# Patient Record
Sex: FEMALE | Race: WHITE | NOT HISPANIC OR LATINO | Employment: STUDENT | URBAN - METROPOLITAN AREA
[De-identification: names, ages, dates, MRNs, and addresses within clinical notes are randomized per-mention and may not be internally consistent; named-entity substitution may affect disease eponyms.]

---
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
## 2020-03-16 ENCOUNTER — APPOINTMENT (OUTPATIENT)
Dept: LAB | Facility: HOSPITAL | Age: 20
End: 2020-03-16
Payer: COMMERCIAL

## 2020-03-16 ENCOUNTER — OFFICE VISIT (OUTPATIENT)
Dept: FAMILY MEDICINE CLINIC | Facility: CLINIC | Age: 20
End: 2020-03-16
Payer: COMMERCIAL

## 2020-03-16 VITALS
HEIGHT: 66 IN | TEMPERATURE: 98.5 F | WEIGHT: 195 LBS | RESPIRATION RATE: 17 BRPM | DIASTOLIC BLOOD PRESSURE: 80 MMHG | HEART RATE: 113 BPM | BODY MASS INDEX: 31.34 KG/M2 | SYSTOLIC BLOOD PRESSURE: 130 MMHG | OXYGEN SATURATION: 98 %

## 2020-03-16 DIAGNOSIS — D50.9 IRON DEFICIENCY ANEMIA, UNSPECIFIED IRON DEFICIENCY ANEMIA TYPE: ICD-10-CM

## 2020-03-16 DIAGNOSIS — R51.9 FREQUENT HEADACHES: ICD-10-CM

## 2020-03-16 DIAGNOSIS — R51.9 FREQUENT HEADACHES: Primary | ICD-10-CM

## 2020-03-16 LAB
25(OH)D3 SERPL-MCNC: 11 NG/ML (ref 30–100)
ALBUMIN SERPL BCP-MCNC: 3.9 G/DL (ref 3.5–5)
ALP SERPL-CCNC: 74 U/L (ref 46–384)
ALT SERPL W P-5'-P-CCNC: 33 U/L (ref 12–78)
ANION GAP SERPL CALCULATED.3IONS-SCNC: 5 MMOL/L (ref 4–13)
AST SERPL W P-5'-P-CCNC: 17 U/L (ref 5–45)
BILIRUB SERPL-MCNC: 0.2 MG/DL (ref 0.2–1)
BUN SERPL-MCNC: 12 MG/DL (ref 5–25)
CALCIUM SERPL-MCNC: 9 MG/DL (ref 8.3–10.1)
CHLORIDE SERPL-SCNC: 109 MMOL/L (ref 100–108)
CO2 SERPL-SCNC: 27 MMOL/L (ref 21–32)
CREAT SERPL-MCNC: 0.76 MG/DL (ref 0.6–1.3)
ERYTHROCYTE [DISTWIDTH] IN BLOOD BY AUTOMATED COUNT: 12.4 % (ref 11.6–15.1)
FOLATE SERPL-MCNC: >20 NG/ML (ref 3.1–17.5)
GFR SERPL CREATININE-BSD FRML MDRD: 114 ML/MIN/1.73SQ M
GLUCOSE SERPL-MCNC: 99 MG/DL (ref 65–140)
HCT VFR BLD AUTO: 38.6 % (ref 34.8–46.1)
HGB BLD-MCNC: 12.4 G/DL (ref 11.5–15.4)
IRON SERPL-MCNC: 63 UG/DL (ref 50–170)
MCH RBC QN AUTO: 29 PG (ref 26.8–34.3)
MCHC RBC AUTO-ENTMCNC: 32.1 G/DL (ref 31.4–37.4)
MCV RBC AUTO: 90 FL (ref 82–98)
PLATELET # BLD AUTO: 255 THOUSANDS/UL (ref 149–390)
PMV BLD AUTO: 10.8 FL (ref 8.9–12.7)
POTASSIUM SERPL-SCNC: 3.8 MMOL/L (ref 3.5–5.3)
PROT SERPL-MCNC: 7.9 G/DL (ref 6.4–8.2)
RBC # BLD AUTO: 4.27 MILLION/UL (ref 3.81–5.12)
SODIUM SERPL-SCNC: 141 MMOL/L (ref 136–145)
TSH SERPL DL<=0.05 MIU/L-ACNC: 1.85 UIU/ML (ref 0.46–3.98)
VIT B12 SERPL-MCNC: 601 PG/ML (ref 100–900)
WBC # BLD AUTO: 5.09 THOUSAND/UL (ref 4.31–10.16)

## 2020-03-16 PROCEDURE — 1036F TOBACCO NON-USER: CPT | Performed by: FAMILY MEDICINE

## 2020-03-16 PROCEDURE — 82607 VITAMIN B-12: CPT

## 2020-03-16 PROCEDURE — 84443 ASSAY THYROID STIM HORMONE: CPT

## 2020-03-16 PROCEDURE — 83540 ASSAY OF IRON: CPT

## 2020-03-16 PROCEDURE — 99214 OFFICE O/P EST MOD 30 MIN: CPT | Performed by: FAMILY MEDICINE

## 2020-03-16 PROCEDURE — 82746 ASSAY OF FOLIC ACID SERUM: CPT

## 2020-03-16 PROCEDURE — 80053 COMPREHEN METABOLIC PANEL: CPT

## 2020-03-16 PROCEDURE — 82306 VITAMIN D 25 HYDROXY: CPT

## 2020-03-16 PROCEDURE — 3008F BODY MASS INDEX DOCD: CPT | Performed by: FAMILY MEDICINE

## 2020-03-16 PROCEDURE — 85027 COMPLETE CBC AUTOMATED: CPT

## 2020-03-16 PROCEDURE — 36415 COLL VENOUS BLD VENIPUNCTURE: CPT

## 2020-03-16 NOTE — PROGRESS NOTES
Assessment/Plan:    Frequent headaches  Patient has chronic recurrent headaches that are different from her migraine headaches  Will check labs as noted  Recommend patient keep a headache diary  Will refer to neurology for further evaluation and management  Iron deficiency anemia  Will assess with labs as indicated  Will determine further management based on the lab results  Diagnoses and all orders for this visit:    Frequent headaches  -     Comprehensive metabolic panel; Future  -     TSH, 3rd generation with Free T4 reflex; Future  -     Vitamin D 25 hydroxy; Future  -     Cancel: Ambulatory referral to Neurology; Future  -     Ambulatory referral to Neurology; Future    Iron deficiency anemia, unspecified iron deficiency anemia type  -     CBC and Platelet; Future  -     Vitamin B12; Future  -     Iron; Future  -     Folate; Future          Subjective:      Patient ID: Vazquez Suresh is a 23 y o  female  Migraines occasionally  Other headaches vary from 1-2/week to daily; various times of the day; no aura/warning; take ibuprofen then lays down with resolution  Head pain on top or at back of head; feels like a dull throbbing; no other symptoms  Has not seen neurology  Ongoing x years  Has glasses x 1 year      Headache    This is a recurrent problem  The current episode started more than 1 year ago  The problem occurs intermittently  The problem has been waxing and waning  The pain is located in the vertex region  The pain does not radiate  The pain quality is similar to prior headaches  The quality of the pain is described as throbbing and dull  Pertinent negatives include no coughing, dizziness, fever, nausea or vomiting  Nothing aggravates the symptoms  She has tried darkened room and NSAIDs for the symptoms  The treatment provided significant relief  Her past medical history is significant for migraine headaches (takes tripans PRN with relief) and migraines in the family (mother)   There is no history of hypertension, recent head traumas or sinus disease  The following portions of the patient's history were reviewed and updated as appropriate: allergies, current medications, past family history, past medical history, past social history, past surgical history and problem list     Review of Systems   Constitutional: Negative for fever  HENT: Negative for congestion  Respiratory: Negative for cough  Gastrointestinal: Negative for nausea and vomiting  Neurological: Positive for headaches  Negative for dizziness  All other systems reviewed and are negative  Objective:      /80 (BP Location: Left arm, Patient Position: Sitting, Cuff Size: Standard)   Pulse (!) 113   Temp 98 5 °F (36 9 °C) (Oral)   Resp 17   Ht 5' 6" (1 676 m)   Wt 88 5 kg (195 lb)   LMP 02/25/2020   SpO2 98%   BMI 31 47 kg/m²          Physical Exam   Constitutional: She is oriented to person, place, and time  She appears well-developed and well-nourished  She is cooperative  HENT:   Head: Normocephalic and atraumatic  Right Ear: Hearing, tympanic membrane, external ear and ear canal normal    Left Ear: Hearing, tympanic membrane, external ear and ear canal normal    Mouth/Throat: Uvula is midline, oropharynx is clear and moist and mucous membranes are normal    Eyes: Pupils are equal, round, and reactive to light  Conjunctivae, EOM and lids are normal    Neck: Trachea normal and normal range of motion  Neck supple  No thyromegaly present  Cardiovascular: Normal rate, regular rhythm and normal heart sounds  No murmur heard  Pulmonary/Chest: Effort normal and breath sounds normal  She has no decreased breath sounds  She has no wheezes  She has no rhonchi  She has no rales  Abdominal: Soft  Normal appearance and bowel sounds are normal  There is no hepatosplenomegaly  There is no tenderness  Musculoskeletal: Normal range of motion  Right ankle: She exhibits no swelling          Left ankle: She exhibits no swelling  Lymphadenopathy:        Head (right side): No submandibular adenopathy present  Head (left side): No submandibular adenopathy present  She has no cervical adenopathy  Neurological: She is alert and oriented to person, place, and time  No cranial nerve deficit  She exhibits normal muscle tone  Gait normal    Skin: Skin is warm, dry and intact  Psychiatric: She has a normal mood and affect  Her speech is normal and behavior is normal    Nursing note and vitals reviewed  BMI Counseling: Body mass index is 31 47 kg/m²  The BMI is above normal  Nutrition recommendations include encouraging healthy choices of fruits and vegetables, limiting drinks that contain sugar and moderation in carbohydrate intake  Exercise recommendations include exercising 3-5 times per week            Lab Results   Component Value Date    WBC 6 07 08/12/2019    HGB 12 4 08/12/2019    HCT 39 0 08/12/2019     08/12/2019    ALT 31 08/14/2018    AST 19 08/14/2018    K 3 9 08/14/2018     08/14/2018    CREATININE 0 74 08/14/2018    BUN 12 08/14/2018    CO2 27 08/14/2018    GLUF 82 08/14/2018

## 2020-03-16 NOTE — PATIENT INSTRUCTIONS
Acute Headache   AMBULATORY CARE:   An acute headache  is pain or discomfort that starts suddenly and gets worse quickly  You may have an acute headache only when you feel stress or eat certain foods  Other acute headache pain can happen every day, and sometimes several times a day  The cause of an acute headache may not be known  It may be triggered by stress, fatigue, hormones, food, or trauma  Common types of acute headache:   · Tension headache  is the most common type of headache  These headaches typically occur in the late afternoon and go away by evening  The pain is usually mild or moderate  You may have problems tolerating bright light or loud noise  The pain is usually across the forehead or in the back of the head, often only on one side  These headaches may occur every day  · Migraine headaches  cause moderate or severe pain  The headache generally lasts from 1 to 3 days and tends to come back  Pain is usually on only one side, but it may change sides  Migraines often occur in the temple, the back of the head, or behind the eye  The pain may throb or be sharp and steady  · A migraine with aura  means you see or feel something before a migraine  You may see a small spot surrounded by bright zigzag lines  Other signs or symptoms may follow the aura  · Cluster headache  pain is usually only on one side  It often causes severe pain, and can last for 30 minutes to 2 hours  These headaches may occur 1 or 2 times each day, more often at night  The pain may wake you  Seek care immediately if:   · You have severe pain  · You have numbness or weakness on one side of your face or body  · You have a headache that occurs after a blow to the head, a fall, or other trauma  · You have a headache, are forgetful or confused, or have trouble speaking  · You have a headache, stiff neck, and a fever  Contact your healthcare provider if:   · You have a constant headache and are vomiting      · You have a headache each day that does not get better, even after treatment  · You have changes in your headaches, or new symptoms that occur when you have a headache  · You have questions or concerns about your condition or care  Treatment:   · Medicine  may be given to decrease pain  The medicine your healthcare provider recommends will depend on the kind of headaches you have  You will need to take prescription headache medicines as directed to prevent a problem called rebound headache  These headaches happen with regular use of pain relievers for headache disorders  NSAIDs or acetaminophen may help some kinds of headaches  · Biofeedback  may help you learn how to change stress reactions  For example, you learn to slow your heart rate when you become upset  You may also learn to prevent certain headaches by combining heat with relaxation  · Cognitive behavior therapy,  or stress management, may be used with other therapies to prevent headaches  Manage your symptoms:   · Apply heat or ice  on the headache area  Use a heat or ice pack  For an ice pack, you can also put crushed ice in a plastic bag  Cover the pack or bag with a towel before you apply it to your skin  Ice and heat both help decrease pain, and heat helps decrease muscle spasms  Apply heat for 20 to 30 minutes every 2 hours  Apply ice for 15 to 20 minutes every hour  Apply heat or ice for as long and for as many days as directed  You may alternate heat and ice  · Relax your muscles  Lie down in a comfortable position and close your eyes  Relax your muscles slowly  Start at your toes and work your way up your body  · Keep a record of your headaches  Write down when your headaches start and stop  Include your symptoms and what you were doing when the headache began  Record what you ate or drank for 24 hours before the headache started  Describe the pain and where it hurts   Keep track of what you did to treat your headache and if it worked  Prevent an acute headache:   · Avoid anything that triggers an acute headache  Examples include exposure to chemicals, going to high altitude, or not getting enough sleep  Create a regular sleep routine  Go to sleep at the same time and wake up at the same time each day  Do not use electronic devices before bedtime  These may trigger a headache or prevent you from sleeping well  · Do not smoke  Nicotine and other chemicals in cigarettes and cigars can trigger an acute headache or make it worse  Ask your healthcare provider for information if you currently smoke and need help to quit  E-cigarettes or smokeless tobacco still contain nicotine  Talk to your healthcare provider before you use these products  · Limit alcohol as directed  Alcohol can trigger an acute headache or make it worse  If you have cluster headaches, do not drink alcohol during an episode  For other types of headaches, ask your healthcare provider if it is safe for you to drink alcohol  Ask how much is safe for you to drink, and how often  · Exercise as directed  Exercise can reduce tension and help with headache pain  Aim for 30 minutes of physical activity on most days of the week  Your healthcare provider can help you create an exercise plan  · Eat a variety of healthy foods  Healthy foods include fruits, vegetables, low-fat dairy products, lean meats, fish, whole grains, and cooked beans  Your healthcare provider or dietitian can help you create meals plans if you need to avoid foods that trigger headaches  Follow up with your healthcare provider as directed:  Bring your headache record with you when you see your healthcare provider  Write down your questions so you remember to ask them during your visits  © 2017 [employer] Information is for End User's use only and may not be sold, redistributed or otherwise used for commercial purposes   All illustrations and images included in CareNotes® are the copyrighted property of [employer]  or Richard Hernandez  The above information is an  only  It is not intended as medical advice for individual conditions or treatments  Talk to your doctor, nurse or pharmacist before following any medical regimen to see if it is safe and effective for you

## 2020-03-16 NOTE — ASSESSMENT & PLAN NOTE
Patient has chronic recurrent headaches that are different from her migraine headaches  Will check labs as noted  Recommend patient keep a headache diary  Will refer to neurology for further evaluation and management

## 2020-03-26 ENCOUNTER — OFFICE VISIT (OUTPATIENT)
Dept: URGENT CARE | Facility: CLINIC | Age: 20
End: 2020-03-26
Payer: COMMERCIAL

## 2020-03-26 VITALS
OXYGEN SATURATION: 98 % | RESPIRATION RATE: 18 BRPM | HEIGHT: 66 IN | DIASTOLIC BLOOD PRESSURE: 84 MMHG | HEART RATE: 92 BPM | TEMPERATURE: 98.2 F | BODY MASS INDEX: 31.02 KG/M2 | SYSTOLIC BLOOD PRESSURE: 134 MMHG | WEIGHT: 193 LBS

## 2020-03-26 DIAGNOSIS — H92.01 EARACHE ON RIGHT: Primary | ICD-10-CM

## 2020-03-26 PROCEDURE — G0382 LEV 3 HOSP TYPE B ED VISIT: HCPCS | Performed by: PHYSICIAN ASSISTANT

## 2020-03-26 NOTE — PROGRESS NOTES
[facility]        NAME: Austen Cooper is a 23 y o  female  : 2000    MRN: [id number]  DATE: 2020  TIME: 4:18 PM    Assessment and Plan   Earache on right [H92 01]  1  Earache on right           Patient Instructions     Recommend continued use of debrox, but discontinue use of q-tips  Recommend OTC ibuprofen for discomfort  Monitor for worsening symptoms  Follow up with PCP in 3-5 days  Proceed to  ER if symptoms worsen  Chief Complaint     Chief Complaint   Patient presents with    Earache     right ear was being cleaned last night with Debrox and a Q-tip  Today ear is painful and muffled  History of Present Illness       Patient presents with complaint of right earache since yesterday  Pt states that she was cleaning her ear with debrox but "it was not doing what I wanted" so she started cleaning it with a q-tip  Pt states that her ear is aching today but denies any sharp pain at any point  Pt denies fever, chills, night sweats, ear drainage, sore throat, cough, and congestion  Pt denies trying any palliative measures  She denies recent sick contacts  Review of Systems   Review of Systems   Constitutional: Negative for chills, fatigue and fever  HENT: Positive for ear pain  Negative for ear discharge  Eyes: Negative for pain  Respiratory: Negative for cough, chest tightness, shortness of breath and wheezing  Cardiovascular: Negative for chest pain and palpitations  Musculoskeletal: Negative for myalgias  Skin: Negative for color change, rash and wound  Neurological: Negative for headaches  All other systems reviewed and are negative          Current Medications       Current Outpatient Medications:     norgestimate-ethinyl estradiol (TRI-LO-GABRIELLE) 0 18/0 215/0 25 MG-25 MCG per tablet, Take 1 tablet by mouth daily, Disp: 28 tablet, Rfl: 2    rizatriptan (MAXALT-MLT) 10 MG disintegrating tablet, Take 1 tablet (10 mg total) by mouth once as needed for migraine for up to 1 dose May repeat in 2 hours if needed, Disp: 9 tablet, Rfl: 0    Current Allergies     Allergies as of 03/26/2020 - Reviewed 03/26/2020   Allergen Reaction Noted    Augmentin [amoxicillin-pot clavulanate] Diarrhea 03/19/2018    Fish-derived products Vomiting 03/19/2018            The following portions of the patient's history were reviewed and updated as appropriate: allergies, current medications, past family history, past medical history, past social history, past surgical history and problem list      History reviewed  No pertinent past medical history  Past Surgical History:   Procedure Laterality Date    ADENOIDECTOMY      TONSILLECTOMY AND ADENOIDECTOMY         Family History   Problem Relation Age of Onset    Diabetes Father     Hypertension Father     Hypertension Mother     Hypertension Maternal Grandmother     Breast cancer Paternal Grandmother          Medications have been verified  Objective   /84   Pulse 92   Temp 98 2 °F (36 8 °C)   Resp 18   Ht 5' 6" (1 676 m)   Wt 87 5 kg (193 lb)   LMP 02/25/2020   SpO2 98%   BMI 31 15 kg/m²        Physical Exam     Physical Exam   Constitutional: She is oriented to person, place, and time  She appears well-developed and well-nourished  No distress  HENT:   Head: Normocephalic and atraumatic  Right Ear: External ear normal    Left Ear: External ear normal    Mouth/Throat: Oropharynx is clear and moist    Right ear: no edema, drainage, or erythema; cerumen impaction, unable to visualize TM; no tenderness   Eyes: Pupils are equal, round, and reactive to light  Conjunctivae and EOM are normal    Neck: Normal range of motion  Neck supple  Cardiovascular: Normal rate, regular rhythm and normal heart sounds  Pulmonary/Chest: Effort normal and breath sounds normal  No respiratory distress  Lymphadenopathy:     She has no cervical adenopathy     Neurological: She is alert and oriented to person, place, and time  No cranial nerve deficit or sensory deficit  Skin: Skin is warm and dry  Capillary refill takes less than 2 seconds  No rash noted  She is not diaphoretic  Psychiatric: She has a normal mood and affect  Her behavior is normal  Thought content normal    Nursing note and vitals reviewed

## 2020-04-08 ENCOUNTER — TELEPHONE (OUTPATIENT)
Dept: NEUROLOGY | Facility: CLINIC | Age: 20
End: 2020-04-08

## 2020-04-14 ENCOUNTER — TELEPHONE (OUTPATIENT)
Dept: NEUROLOGY | Facility: CLINIC | Age: 20
End: 2020-04-14

## 2020-04-17 ENCOUNTER — TELEMEDICINE (OUTPATIENT)
Dept: NEUROLOGY | Facility: CLINIC | Age: 20
End: 2020-04-17
Payer: COMMERCIAL

## 2020-04-17 ENCOUNTER — TELEPHONE (OUTPATIENT)
Dept: NEUROLOGY | Facility: CLINIC | Age: 20
End: 2020-04-17

## 2020-04-17 DIAGNOSIS — R51.9 ACUTE NONINTRACTABLE HEADACHE, UNSPECIFIED HEADACHE TYPE: ICD-10-CM

## 2020-04-17 DIAGNOSIS — R51.9 FREQUENT HEADACHES: ICD-10-CM

## 2020-04-17 PROCEDURE — 99203 OFFICE O/P NEW LOW 30 MIN: CPT | Performed by: PSYCHIATRY & NEUROLOGY

## 2020-04-17 RX ORDER — RIZATRIPTAN BENZOATE 10 MG/1
10 TABLET, ORALLY DISINTEGRATING ORAL ONCE AS NEEDED
Qty: 9 TABLET | Refills: 0 | Status: SHIPPED | OUTPATIENT
Start: 2020-04-17 | End: 2021-02-10 | Stop reason: SDUPTHER

## 2020-04-17 RX ORDER — TOPIRAMATE 25 MG/1
TABLET ORAL
Qty: 90 TABLET | Refills: 0 | Status: SHIPPED | OUTPATIENT
Start: 2020-04-17 | End: 2020-05-20 | Stop reason: SDUPTHER

## 2020-04-28 DIAGNOSIS — N92.0 MENORRHAGIA WITH REGULAR CYCLE: ICD-10-CM

## 2020-04-28 RX ORDER — NORGESTIMATE AND ETHINYL ESTRADIOL
KIT
Qty: 28 TABLET | Refills: 0 | OUTPATIENT
Start: 2020-04-28

## 2020-05-20 ENCOUNTER — TELEMEDICINE (OUTPATIENT)
Dept: NEUROLOGY | Facility: CLINIC | Age: 20
End: 2020-05-20
Payer: COMMERCIAL

## 2020-05-20 ENCOUNTER — TELEPHONE (OUTPATIENT)
Dept: NEUROLOGY | Facility: CLINIC | Age: 20
End: 2020-05-20

## 2020-05-20 DIAGNOSIS — R51.9 FREQUENT HEADACHES: ICD-10-CM

## 2020-05-20 PROCEDURE — 99213 OFFICE O/P EST LOW 20 MIN: CPT | Performed by: PSYCHIATRY & NEUROLOGY

## 2020-05-20 RX ORDER — TOPIRAMATE 25 MG/1
TABLET ORAL
Qty: 180 TABLET | Refills: 0 | Status: SHIPPED | OUTPATIENT
Start: 2020-05-20 | End: 2020-09-18 | Stop reason: SDUPTHER

## 2020-06-02 ENCOUNTER — HOSPITAL ENCOUNTER (OUTPATIENT)
Dept: MRI IMAGING | Facility: HOSPITAL | Age: 20
Discharge: HOME | End: 2020-06-02
Attending: PSYCHIATRY & NEUROLOGY
Payer: COMMERCIAL

## 2020-06-02 DIAGNOSIS — R51.9 FREQUENT HEADACHES: ICD-10-CM

## 2020-06-02 DIAGNOSIS — R51.9 ACUTE NONINTRACTABLE HEADACHE, UNSPECIFIED HEADACHE TYPE: ICD-10-CM

## 2020-06-02 PROCEDURE — 70551 MRI BRAIN STEM W/O DYE: CPT

## 2020-06-03 ENCOUNTER — TELEPHONE (OUTPATIENT)
Dept: NEUROLOGY | Facility: CLINIC | Age: 20
End: 2020-06-03

## 2020-06-03 DIAGNOSIS — N92.0 MENORRHAGIA WITH REGULAR CYCLE: Primary | ICD-10-CM

## 2020-06-03 DIAGNOSIS — R93.0 ABNORMAL MRI OF HEAD: ICD-10-CM

## 2020-06-05 ENCOUNTER — TELEPHONE (OUTPATIENT)
Dept: NEUROLOGY | Facility: CLINIC | Age: 20
End: 2020-06-05

## 2020-06-08 ENCOUNTER — APPOINTMENT (OUTPATIENT)
Dept: LAB | Facility: HOSPITAL | Age: 20
End: 2020-06-08
Attending: PSYCHIATRY & NEUROLOGY
Payer: COMMERCIAL

## 2020-06-08 DIAGNOSIS — N92.0 MENORRHAGIA WITH REGULAR CYCLE: ICD-10-CM

## 2020-06-08 DIAGNOSIS — R93.0 ABNORMAL MRI OF HEAD: ICD-10-CM

## 2020-06-08 LAB
BUN SERPL-MCNC: 10 MG/DL (ref 5–25)
CREAT SERPL-MCNC: 0.86 MG/DL (ref 0.6–1.3)
GFR SERPL CREATININE-BSD FRML MDRD: 98 ML/MIN/1.73SQ M

## 2020-06-08 PROCEDURE — 82565 ASSAY OF CREATININE: CPT

## 2020-06-08 PROCEDURE — 36415 COLL VENOUS BLD VENIPUNCTURE: CPT

## 2020-06-08 PROCEDURE — 84520 ASSAY OF UREA NITROGEN: CPT

## 2020-06-10 ENCOUNTER — HOSPITAL ENCOUNTER (OUTPATIENT)
Dept: MRI IMAGING | Facility: HOSPITAL | Age: 20
Discharge: HOME | End: 2020-06-10
Attending: PSYCHIATRY & NEUROLOGY
Payer: COMMERCIAL

## 2020-06-10 DIAGNOSIS — N92.0 MENORRHAGIA WITH REGULAR CYCLE: ICD-10-CM

## 2020-06-10 DIAGNOSIS — R93.0 ABNORMAL MRI OF HEAD: ICD-10-CM

## 2020-06-10 PROCEDURE — A9585 GADOBUTROL INJECTION: HCPCS | Performed by: PSYCHIATRY & NEUROLOGY

## 2020-06-10 PROCEDURE — 70553 MRI BRAIN STEM W/O & W/DYE: CPT

## 2020-06-10 RX ADMIN — GADOBUTROL 8 ML: 604.72 INJECTION INTRAVENOUS at 16:53

## 2020-06-12 ENCOUNTER — TELEPHONE (OUTPATIENT)
Dept: NEUROLOGY | Facility: CLINIC | Age: 20
End: 2020-06-12

## 2020-06-17 DIAGNOSIS — N92.0 MENORRHAGIA WITH REGULAR CYCLE: ICD-10-CM

## 2020-06-17 RX ORDER — NORGESTIMATE AND ETHINYL ESTRADIOL 7DAYSX3 LO
1 KIT ORAL DAILY
Qty: 28 TABLET | Refills: 2 | Status: SHIPPED | OUTPATIENT
Start: 2020-06-17 | End: 2021-02-10 | Stop reason: SDUPTHER

## 2020-07-14 ENCOUNTER — OFFICE VISIT (OUTPATIENT)
Dept: FAMILY MEDICINE CLINIC | Facility: CLINIC | Age: 20
End: 2020-07-14
Payer: COMMERCIAL

## 2020-07-14 VITALS
HEIGHT: 66 IN | WEIGHT: 175 LBS | RESPIRATION RATE: 17 BRPM | DIASTOLIC BLOOD PRESSURE: 80 MMHG | OXYGEN SATURATION: 98 % | SYSTOLIC BLOOD PRESSURE: 132 MMHG | HEART RATE: 110 BPM | TEMPERATURE: 99.2 F | BODY MASS INDEX: 28.12 KG/M2

## 2020-07-14 DIAGNOSIS — E23.7 PITUITARY ABNORMALITY (HCC): Primary | ICD-10-CM

## 2020-07-14 PROCEDURE — 1036F TOBACCO NON-USER: CPT | Performed by: FAMILY MEDICINE

## 2020-07-14 PROCEDURE — 3008F BODY MASS INDEX DOCD: CPT | Performed by: FAMILY MEDICINE

## 2020-07-14 PROCEDURE — 99214 OFFICE O/P EST MOD 30 MIN: CPT | Performed by: FAMILY MEDICINE

## 2020-07-14 NOTE — PATIENT INSTRUCTIONS
Pituitary Adenoma   WHAT YOU NEED TO KNOW:   What is a pituitary adenoma? A pituitary adenoma is a benign (not cancer) tumor found in your pituitary  The pituitary is a small gland in your brain that makes hormones to control other organs and tissues in your body  A pituitary adenoma can put pressure on nearby nerves and brain tissue  A pituitary adenoma can also release high levels of hormones that affect how other organs and tissues work  What are the signs and symptoms of a pituitary adenoma? Your signs and symptoms will depend on the size of the tumor and the hormones released  The following are the most common signs and symptoms of a pituitary adenoma:  · Headaches    · Changes in your vision    · Tender or enlarged breasts    · Difficulty getting pregnant  How is a pituitary adenoma diagnosed? · Blood tests  are done to measure hormone levels and get information about your overall health  · A 24 hour urine test  is done to check the amount of hormone in your urine  You will need to collect all your urine for 1 day  Healthcare providers will give you a container to hold your urine and keep it cold  · A CT or MRI  will show the size and location of the pituitary adenoma  You may be given contrast dye to help the tumor show up better in the pictures  Tell the healthcare provider if you have ever had an allergic reaction to contrast dye  Do not enter the MRI room with anything metal  Metal can cause serious injury  Tell the healthcare provider if you have any metal in or on your body  How is a pituitary adenoma treated? · Medicines  decrease hormone levels when they are too high  · Surgery  is done to remove or decrease the size of the pituitary adenoma  · Radiation therapy or radiosurgery  uses high energy x-ray beams to kill tumor cells and decrease the size of the tumor  When should I contact my healthcare provider?    · You feel fatigued, anxious, or you have sudden mood changes  · You have an increased amount of facial hair  · You have changes in the shape of your face  · Your period stops or becomes irregular  · You have questions or concerns about your condition or care  When should I seek immediate care or call 911? · You are dizzy or feel confused  · You have a severe headache and a stiff, painful neck  · You have sudden weight gain or weight loss  · Your heart rate is faster and stronger than normal for you  · You have sudden vision changes or cannot move your eyes from side to side  CARE AGREEMENT:   You have the right to help plan your care  Learn about your health condition and how it may be treated  Discuss treatment options with your caregivers to decide what care you want to receive  You always have the right to refuse treatment  The above information is an  only  It is not intended as medical advice for individual conditions or treatments  Talk to your doctor, nurse or pharmacist before following any medical regimen to see if it is safe and effective for you  © 2017 [employer] Information is for End User's use only and may not be sold, redistributed or otherwise used for commercial purposes  All illustrations and images included in CareNotes® are the copyrighted property of [employer]  or Richard Hernandez  Not applicable

## 2020-07-14 NOTE — PROGRESS NOTES
Assessment/Plan:    Pituitary abnormality ([facility] )  Will begin evaluation with labs ordered  Will also refer to endocrinology and ophthalmology for further evaluation and management of this issue  Diagnoses and all orders for this visit:    Pituitary abnormality ([facility] )  -     Prolactin; Future  -     Insulin-like growth factor 1 (IGF-1); Future  -     FSH and LH; Future  -     TSH, 3rd generation; Future  -     T4, free; Future  -     Cortisol, Free, Urine, 24 Hour; Future  -     Ambulatory referral to Endocrinology; Future  -     Ambulatory referral to Ophthalmology; Future          Subjective:      Patient ID: Antoinette Carbajal is a 21 y o  female  Patient presents to clinic today for a follow-up visit  Patient was sent for follow-up by neurology due to an abnormal finding on MRI as follows:    "Mild diffuse enlargement of the pituitary gland with homogeneous enhancement  Differential considerations include pituitary hyperplasia versus macroadenoma  Correlation with clinical and laboratory parameters recommended for endocrine dysfunction "    Neurology's recommendation was as follows:    "Pt needs to have this followed up by her pcp for any endocrine dysfunction  Pt will need pituitary labs and likely need endocrine consultation  I would also recommend pt to see optho to follow her vision  Per imaging the optic chiasm is normal  Pcp and endo will need to figure out if pituitary is just enlarged or if any underlying abnormal growth  Please let pt know further eval is needed by her pcp "    Patient denies any symptoms; she reports she feels normal   She had a prolactin level done in 2018 due to menorrhagia with a normal prolactin level noted        The following portions of the patient's history were reviewed and updated as appropriate: allergies, current medications, past family history, past medical history, past social history, past surgical history and problem list     Review of Systems   Eyes: Negative for visual disturbance  Psychiatric/Behavioral: Positive for dysphoric mood (social distancing causing some emotional distress)  Objective:      /80 (BP Location: Left arm, Patient Position: Sitting, Cuff Size: Standard)   Pulse (!) 110   Temp 99 2 °F (37 3 °C) (Tympanic)   Resp 17   Ht 5' 6" (1 676 m)   Wt 79 4 kg (175 lb)   LMP 06/29/2020   SpO2 98%   BMI 28 25 kg/m²          Physical Exam   Constitutional: She is oriented to person, place, and time  She appears well-developed and well-nourished  She is cooperative  No distress  HENT:   Head: Normocephalic and atraumatic  Right Ear: Hearing and external ear normal    Left Ear: Hearing and external ear normal    Eyes: Conjunctivae and lids are normal    Cardiovascular: Normal rate  Pulmonary/Chest: Effort normal  No respiratory distress  Musculoskeletal: Normal range of motion  Neurological: She is alert and oriented to person, place, and time  She exhibits normal muscle tone  Gait normal    Skin: Skin is warm, dry and intact  Psychiatric: She has a normal mood and affect  Her speech is normal and behavior is normal  Thought content normal    Nursing note and vitals reviewed

## 2020-07-14 NOTE — ASSESSMENT & PLAN NOTE
Will begin evaluation with labs ordered  Will also refer to endocrinology and ophthalmology for further evaluation and management of this issue

## 2020-07-27 ENCOUNTER — APPOINTMENT (OUTPATIENT)
Dept: LAB | Facility: HOSPITAL | Age: 20
End: 2020-07-27
Payer: COMMERCIAL

## 2020-07-27 DIAGNOSIS — E23.7 PITUITARY ABNORMALITY (HCC): ICD-10-CM

## 2020-07-27 LAB
FSH SERPL-ACNC: 4.7 MIU/ML
LH SERPL-ACNC: 25.2 MIU/ML
PROLACTIN SERPL-MCNC: 9.5 NG/ML
T4 FREE SERPL-MCNC: 1.09 NG/DL (ref 0.78–1.33)
TSH SERPL DL<=0.05 MIU/L-ACNC: 2.15 UIU/ML (ref 0.46–3.98)

## 2020-07-27 PROCEDURE — 84146 ASSAY OF PROLACTIN: CPT

## 2020-07-27 PROCEDURE — 84439 ASSAY OF FREE THYROXINE: CPT

## 2020-07-27 PROCEDURE — 36415 COLL VENOUS BLD VENIPUNCTURE: CPT

## 2020-07-27 PROCEDURE — 83002 ASSAY OF GONADOTROPIN (LH): CPT

## 2020-07-27 PROCEDURE — 84305 ASSAY OF SOMATOMEDIN: CPT

## 2020-07-27 PROCEDURE — 84443 ASSAY THYROID STIM HORMONE: CPT

## 2020-07-27 PROCEDURE — 83001 ASSAY OF GONADOTROPIN (FSH): CPT

## 2020-07-29 ENCOUNTER — APPOINTMENT (OUTPATIENT)
Dept: LAB | Facility: HOSPITAL | Age: 20
End: 2020-07-29
Payer: COMMERCIAL

## 2020-07-29 DIAGNOSIS — E23.7 PITUITARY ABNORMALITY (HCC): ICD-10-CM

## 2020-07-29 LAB — IGF-I SERPL-MCNC: 112 NG/ML (ref 108–384)

## 2020-07-29 PROCEDURE — 82530 CORTISOL FREE: CPT

## 2020-08-02 LAB
CORTIS F 24H UR-MRATE: 10 UG/24 HR (ref 6–42)
CORTIS F UR-MCNC: 9 UG/L

## 2020-08-19 ENCOUNTER — CONSULT (OUTPATIENT)
Dept: ENDOCRINOLOGY | Facility: CLINIC | Age: 20
End: 2020-08-19
Payer: COMMERCIAL

## 2020-08-19 VITALS
HEART RATE: 104 BPM | SYSTOLIC BLOOD PRESSURE: 108 MMHG | WEIGHT: 165 LBS | HEIGHT: 66 IN | DIASTOLIC BLOOD PRESSURE: 70 MMHG | BODY MASS INDEX: 26.52 KG/M2

## 2020-08-19 DIAGNOSIS — E23.7 PITUITARY ABNORMALITY (HCC): ICD-10-CM

## 2020-08-19 PROCEDURE — 99243 OFF/OP CNSLTJ NEW/EST LOW 30: CPT | Performed by: INTERNAL MEDICINE

## 2020-08-19 RX ORDER — FERROUS SULFATE 325(65) MG
325 TABLET ORAL
COMMUNITY

## 2020-08-19 RX ORDER — MELATONIN
1000 DAILY
COMMUNITY

## 2020-08-19 NOTE — PROGRESS NOTES
Chief Complaint   Patient presents with    enlarged pituitary      Referring Provider  Camille Rachel Md  [address] [facility], [address] [place] [address]     History of Present Illness:   Shin Dye is a 21 y o  female with an enlarged pituitary seen in consultation at the request of Dr Thomas Douglas  She had a history of headaches and has an MRI done as part of this evaluation in June 2020  A change in the pituitary was noted, and she had a dedicated pituitary MRI later in June 2020  No adenoma was identified, but there was enlargement with some stalk deviation  There is no other history of endocrine disease  Her mother is with her and volunteers that she had an elevated prolactin and took medication briefly, but this resolved after her pregnancy    Ms Roxanne Barger has used OCPs for 2-3 years, and had a break in use recently  She resumed this about one week prior to her labs in July    She recently had visual fields , which were normal     Her growth and development seem normal, and is a similar height as family members  She did have a concussion in high school, but only once  She feels well and had an intentional weight loss  She takes Topamax for her headaches which have improved      Patient Active Problem List   Diagnosis    Menorrhagia with regular cycle    Iron deficiency anemia    BMI 31 0-31 9,adult    Frequent headaches    Pituitary abnormality ([address] )      History reviewed  No pertinent past medical history     Past Surgical History:   Procedure Laterality Date    ADENOIDECTOMY      TONSILLECTOMY AND ADENOIDECTOMY        Family History   Problem Relation Age of Onset    Diabetes Father     Hypertension Father     No Known Problems Mother     Hypertension Maternal Grandmother     Breast cancer Paternal Grandmother      Social History     Tobacco Use    Smoking status: Never Smoker    Smokeless tobacco: Never Used   Substance Use Topics    Alcohol use: Not Currently     Comment: socially     Allergies   Allergen Reactions    Augmentin [Amoxicillin-Pot Clavulanate] Diarrhea    Fish-Derived Products Vomiting         Current Outpatient Medications:     cholecalciferol (VITAMIN D3) 1,000 units tablet, Take 1,000 Units by mouth daily, Disp: , Rfl:     ferrous sulfate 325 (65 Fe) mg tablet, Take 325 mg by mouth daily at bedtime, Disp: , Rfl:     Fiber Complete TABS, Take 2 tablets by mouth daily, Disp: , Rfl:     Magnesium 400 MG CAPS, Take 400 mg by mouth daily, Disp: , Rfl:     norgestimate-ethinyl estradiol (Tri-Lo-Terrie) 0 18/0 215/0 25 MG-25 MCG per tablet, Take 1 tablet by mouth daily, Disp: 28 tablet, Rfl: 2    rizatriptan (MAXALT-MLT) 10 MG disintegrating tablet, Take 1 tablet (10 mg total) by mouth once as needed for migraine for up to 1 dose May repeat in 2 hours if needed, Disp: 9 tablet, Rfl: 0    topiramate (TOPAMAX) 25 mg tablet, 2 tab po qhs, Disp: 180 tablet, Rfl: 0  Review of Systems   Constitutional: Negative for fatigue and unexpected weight change  HENT: Negative for hearing loss, trouble swallowing and voice change  Eyes: Negative for visual disturbance  Respiratory: Negative for cough and shortness of breath  Cardiovascular: Negative for chest pain and palpitations  Gastrointestinal: Negative for blood in stool, diarrhea and nausea  Endocrine: Negative for polydipsia and polyuria  Genitourinary: Negative for menstrual problem  Musculoskeletal: Negative for arthralgias and myalgias  Skin: Negative for rash  Neurological: Positive for headaches  Negative for tremors  Psychiatric/Behavioral: Negative for decreased concentration  The patient is not nervous/anxious  All other systems reviewed and are negative  see also HPI    Physical Exam:  Body mass index is 26 63 kg/m²    /70 (BP Location: Left arm, Patient Position: Sitting, Cuff Size: Standard)   Pulse 104   Ht 5' 6" (1 676 m)   Wt 74 8 kg (165 lb)   BMI 26 63 kg/m²    Wt Readings from Last 3 Encounters:   08/19/20 74 8 kg (165 lb)   07/14/20 79 4 kg (175 lb)   03/26/20 87 5 kg (193 lb) (97 %, Z= 1 83)*     * Growth percentiles are based on [facility] (Girls, 2-20 Years) data  GEN: NAD  E/n/m mask in place, hearing intact bilat  Eyes: no stare or proptosis, nl lids and conjunctiva, EOMI  Neck: trachea midline, thyroid NT to palpation, nl in size, no nodules or neck masses noted, no cervical LAD  CV; heart reg rate s1s2 nl, no m/r/g appreciated, no SUNITHA  Resp: CTAB, good effort  Ab+BS  Neuro: no tremor, 2+ DTRs in BUE  MS: no c/c in digits, moves all 4 ext, nl muscle bulk, gait nl, strength 5/5 BU and BLE prox muscle groups  Skin: warm and dry, no palmar erythema  Psych: nl mood and affect, no gross lapses in memory    DATA:  Labs:     Lab Results   Component Value Date    YZO2YJAOLUYM 2 150 07/27/2020       Lab Results   Component Value Date    FREET4 1 09 07/27/2020       Lab Results   Component Value Date    SODIUM 141 03/16/2020    K 3 8 03/16/2020     (H) 03/16/2020    CO2 27 03/16/2020    BUN 10 06/08/2020    CREATININE 0 86 06/08/2020    GLUC 99 03/16/2020    CALCIUM 9 0 03/16/2020       Lab Results   Component Value Date    WYH1BQIRCXIJ 2 150 07/27/2020         PRL: 9 5  IGF1 112  FSH 4 7  LH 25 2      Radiology    6/2020  SELLA AND PITUITARY GLAND:  The pituitary gland is mildly enlarged in craniocaudad height measuring 13 mm with AP dimension of 9 mm in transverse dimension of 12 mm  Pituitary stalk is slightly deviated to the left  The gland homogeneously enhances  Differential considerations include pituitary macroadenoma versus pituitary hyperplasia  Optic chiasm is unremarkable  Cavernous sinus regions unremarkable  Impression:  1  Pituitary abnormality [facility])           Plan:    Candace Gifford was seen today for enlarged pituitary      Diagnoses and all orders for this visit:    Pituitary abnormality [facility])  -     Ambulatory referral to Endocrinology  -     Cortisol- Lab Collect; Future  -     ACTH- Lab Collect; Future      1  Pituitary abnormality [facility])    - Ambulatory referral to Endocrinology    1  Pituitary enlargement: An adenoma is not seen  There can be physiologic enlargement of the pituitary at various points of life including adolescence  At this time, she has most of the testing for her pituitary gland done and they are normal  I recommend an 8am cortisol and ACTH, and this is ordered  She will f/u in a year and we can repeat imaging  I asked her to call the office with concerns or questions and she will       Discussed with the patient and all questioned fully answered  She will call me if any problems arise          Vida Schaeffer MD

## 2020-08-28 ENCOUNTER — TELEMEDICINE (OUTPATIENT)
Dept: NEUROLOGY | Facility: CLINIC | Age: 20
End: 2020-08-28
Payer: COMMERCIAL

## 2020-08-28 DIAGNOSIS — E23.7 PITUITARY ABNORMALITY (HCC): ICD-10-CM

## 2020-08-28 DIAGNOSIS — R51.9 FREQUENT HEADACHES: Primary | ICD-10-CM

## 2020-08-28 PROCEDURE — 1036F TOBACCO NON-USER: CPT | Performed by: PSYCHIATRY & NEUROLOGY

## 2020-08-28 PROCEDURE — 99214 OFFICE O/P EST MOD 30 MIN: CPT | Performed by: PSYCHIATRY & NEUROLOGY

## 2020-08-28 NOTE — ASSESSMENT & PLAN NOTE
Pt seen today in neuro follow up  Pt is now back at [employer] [facility] [employer] [facility]  Pt notes headaches much improved  Much less freq and severity since start of topamax  Pt aware to not concieve on medication  Pt notes the increase to 50 mg hs has helped  Pt to call for refills  Rev in detail imaging of mri head and mri pituitary done in June   Mri head June 2 2020 no acute intracranial abn  Incidentally noted was borderline enl of the pituitary glad question of hyperplasia vs macroadenoma  Due to history of menorrhagia in past, we also pursued the mri pituitary with and without contrast with again mild diffuse enl of the pituitary gland with same differential  Pt seen by pcp as well as  Endo  Pt has one final lab for endo with follow up in one yr as no discrete mass,  Per endo likely related to various points of life during adolescence  Pt already has scheduled endo follow up appt in one yr as well

## 2020-08-28 NOTE — PROGRESS NOTES
Virtual Regular Visit      Assessment/Plan:    Problem List Items Addressed This Visit        Endocrine    Pituitary abnormality ([address] )     Pt followed up with pcp and endo  Rev consultation note from dr Toan Ramos  Pt is aware of need for one additional lab and follow up in one  Yr  Pt to also follow up with optho annually            Other    Frequent headaches - Primary     Pt seen today in neuro follow up  Pt is now back at [facility]  Pt notes headaches much improved  Much less freq and severity since start of topamax  Pt aware to not concieve on medication  Pt notes the increase to 50 mg hs has helped  Pt to call for refills  Rev in detail imaging of mri head and mri pituitary done in June   Mri head June 2 2020 no acute intracranial abn  Incidentally noted was borderline enl of the pituitary glad question of hyperplasia vs macroadenoma  Due to history of menorrhagia in past, we also pursued the mri pituitary with and without contrast with again mild diffuse enl of the pituitary gland with same differential  Pt seen by pcp as well as  Endo  Pt has one final lab for endo with follow up in one yr as no discrete mass,  Per endo likely related to various points of life during adolescence  Pt already has scheduled endo follow up appt in one yr as well                    Reason for visit is   Chief Complaint   Patient presents with    Follow-up    Virtual Regular Visit        Encounter provider Teresita Kerns MD    Provider located at [address] [facility] [id number]      Recent Visits  No visits were found meeting these conditions  Showing recent visits within past 7 days and meeting all other requirements     Today's Visits  Date Type Provider Dept   08/28/20 Telemedicine Teresita Kerns MD [facility] [place]   Showing today's visits and meeting all other requirements     Future Appointments  No visits were found meeting these conditions  Showing future appointments within next 150 days and meeting all other requirements        The patient was identified by name and date of birth  Natty Thomson was informed that this is a telemedicine visit and that the visit is being conducted through [facility]  My office door was closed  No one else was in the room  She acknowledged consent and understanding of privacy and security of the video platform  The patient has agreed to participate and understands they can discontinue the visit at any time  Patient is aware this is a billable service  Subjective  Natty Thomson is a 21 y o  female  with pmh of menorrhagia and iron deficiency who presents today for eval of her headaches    pt last seen on 5/20/20 by me  Per my last note "Pt notes long standing headaches for the past 7 yrs since middle school  Pt notes family history of migraines as well   No other focal associated sxs   Pt notes she is at school as a sophomore at [employer] and home now due to corona and school closed   Pt notes she has been keeping headache diary and has about 1-2 headaches per week   Pt has used nsaids but has decreased this usage and uses maxalt for breathru rxed by her pcp which works very effectively for her   Pt notes sleeping and dark room helps   No n or v  Pt notes family history of migraines in her mom   Pt also with some heart disease in her family rufino on dad side   Rev with pt need to be watchful with triptans for any chest discomfort or sob "  Kept above paragraph  Pt notes since our last visit pt has now tried topamax at 25 mg hs  Pt notes improvement in her headache freq and severity  Pt notes a difference since starting med  No perioral paresthesias or paresthesias of the extremities  Pt notes no change in her weight  Pt is aware that med may lower efficacy of her bcp  "    Pt here for neuro follow up  Pt seen as televisit as pt is now back to college at [employer]    Pt has already started classes mosly on line  Pt notes her headaches have much improved since last vsiit  Pt is now on 50 mg hs  Pt is tolerating med well  Pt initially noted a little bit of weight loss but now right back at her baseline  Pt notes she is actually trying to lose weight  No new sxs  No paresthesias of the upper or lower ext  Pt denies any new sxs  No falls or trips  No change in bowel or bladder  No LOC, no seizure  No change in speech or swallowing  No change in vision  No loss of vision  No diplopia  No loss of vision  No headache, no nausea or vomiting  No recent hospitalizations  No recent infections  pt had updated imaging as par of her headache work up  Mri head from 6/2/20 "No acute parenchymal abnormality  Borderline enlargement of the pituitary gland with a convex superior margin  This may represent pituitary hyperplasia, particularly if the patient is on oral contraceptives  Does the patient have a known endocrine abnormality or prior imaging? If concerned of an underlying pituitary adenoma, dedicated MR imaging of the sella with and without contrast is recommended "  Due to history of menorrhagai, we recommended pt to pursue dedicated mri pituitary gland  Study done on 6/10/20 "Mild diffuse enlargement of the pituitary gland with homogeneous enhancement  Differential considerations include pituitary hyperplasia versus macroadenoma  Correlation with clinical and laboratory parameters recommended for endocrine dysfunction"  Pt was then referred to pcp and had updated labs and then sent to endo  Rev notes from dr Jed Bravo and no clear mass pathology seen  Per notes, likely related to various points of life during adolescence  Majority of pituitary labs normal and endo ordered one additional study not done yet  Pt notes she has done much better from headaches since increase on the topamax from 25 to 50 mg strength  Pt notes no paresthesias of the upper ext    Pt remains aware that she should not concieve on this medication and pt has no plans for upcoming pregnancy  Pt already has appt follow up with endo in one yr as well  Reminded pt to have annual optho eval to also keep maintaince for any visual changes related to pituitary as well              The following portions of the patient's history were reviewed and updated as appropriate: allergies, current medications, past family history, past medical history, past social history, past surgical history and problem list and  12 system ros rev and med rec rev as well at appt  HPI     History reviewed  No pertinent past medical history  Past Surgical History:   Procedure Laterality Date    ADENOIDECTOMY      TONSILLECTOMY AND ADENOIDECTOMY         Current Outpatient Medications   Medication Sig Dispense Refill    cholecalciferol (VITAMIN D3) 1,000 units tablet Take 1,000 Units by mouth daily      ferrous sulfate 325 (65 Fe) mg tablet Take 325 mg by mouth daily at bedtime      Fiber Complete TABS Take 2 tablets by mouth daily      Magnesium 400 MG CAPS Take 400 mg by mouth daily      norgestimate-ethinyl estradiol (Tri-Lo-Terrie) 0 18/0 215/0 25 MG-25 MCG per tablet Take 1 tablet by mouth daily 28 tablet 2    rizatriptan (MAXALT-MLT) 10 MG disintegrating tablet Take 1 tablet (10 mg total) by mouth once as needed for migraine for up to 1 dose May repeat in 2 hours if needed 9 tablet 0    topiramate (TOPAMAX) 25 mg tablet 2 tab po qhs 180 tablet 0     No current facility-administered medications for this visit  Allergies   Allergen Reactions    Augmentin [Amoxicillin-Pot Clavulanate] Diarrhea    Fish-Derived Products Vomiting       Review of Systems   Constitutional: Negative  Negative for appetite change and fever  HENT: Negative  Negative for hearing loss, tinnitus, trouble swallowing and voice change  Eyes: Negative  Negative for photophobia and pain  Respiratory: Negative  Negative for shortness of breath      Cardiovascular: Negative  Negative for palpitations  Gastrointestinal: Negative  Negative for nausea and vomiting  Endocrine: Negative  Negative for cold intolerance  Genitourinary: Negative  Negative for dysuria, frequency and urgency  Musculoskeletal: Negative  Negative for myalgias and neck pain  Skin: Negative  Negative for rash  Neurological: Negative  Negative for dizziness, tremors, seizures, syncope, facial asymmetry, speech difficulty, weakness, light-headedness, numbness and headaches  Hematological: Negative  Does not bruise/bleed easily  Psychiatric/Behavioral: Negative  Negative for confusion, hallucinations and sleep disturbance  Video Exam    There were no vitals filed for this visit  Physical Exam  Constitutional:       General: She is not in acute distress  Appearance: She is not ill-appearing  Eyes:      General: No visual field deficit  Musculoskeletal:      Right lower leg: No edema  Left lower leg: No edema  Neurological:      Mental Status: She is alert  Cranial Nerves: Cranial nerves are intact  No cranial nerve deficit, dysarthria or facial asymmetry  Motor: Motor function is intact  No weakness, tremor, atrophy, abnormal muscle tone or pronator drift  Coordination: Coordination is intact  Romberg sign negative  Coordination normal  Finger-Nose-Finger Test and Heel to Jyothi Zeb Test normal  Rapid alternating movements normal       Comments: No aphasia  No dysarthria  cn2-12 intact  Motor full  No diff with gait                VIRTUAL VISIT [id number] [address] URIBE acknowledges that she has consented to an online visit or consultation  She understands that the online visit is based solely on information provided by her, and that, in the absence of a face-to-face physical evaluation by the physician, the diagnosis she receives is both limited and provisional in terms of accuracy and completeness   This is not intended to replace a full medical face-to-face evaluation by the physician  Ashli Prabhakar understands and accepts these terms

## 2020-08-28 NOTE — ASSESSMENT & PLAN NOTE
Pt followed up with pcp and endo  Rev consultation note from dr [place]  Pt is aware of need for one additional lab and follow up in one  Yr  Pt to also follow up with optho annually

## 2020-09-08 ENCOUNTER — APPOINTMENT (OUTPATIENT)
Dept: LAB | Facility: HOSPITAL | Age: 20
End: 2020-09-08
Attending: INTERNAL MEDICINE
Payer: COMMERCIAL

## 2020-09-08 DIAGNOSIS — E23.7 PITUITARY ABNORMALITY (HCC): ICD-10-CM

## 2020-09-08 LAB — CORTIS SERPL-MCNC: 31.4 UG/DL

## 2020-09-08 PROCEDURE — 36415 COLL VENOUS BLD VENIPUNCTURE: CPT

## 2020-09-08 PROCEDURE — 82533 TOTAL CORTISOL: CPT

## 2020-09-08 PROCEDURE — 82024 ASSAY OF ACTH: CPT

## 2020-09-09 LAB — ACTH PLAS-MCNC: 14.2 PG/ML (ref 7.2–63.3)

## 2020-09-10 ENCOUNTER — TELEPHONE (OUTPATIENT)
Dept: ENDOCRINOLOGY | Facility: CLINIC | Age: 20
End: 2020-09-10

## 2020-09-10 DIAGNOSIS — E23.7 PITUITARY ABNORMALITY (HCC): Primary | ICD-10-CM

## 2020-09-10 NOTE — TELEPHONE ENCOUNTER
----- Message from Rose Martinez MD sent at 9/10/2020  8:11 AM EDT -----  Hello- I reviewed her labs  The cortisol and ACTH are normal in that there is no cortisol deficiencyes  For completeness, I would like her to do 2 late night salivary cortisol to make sure there is not an overproduction also   Thanks

## 2020-09-18 DIAGNOSIS — R51.9 FREQUENT HEADACHES: ICD-10-CM

## 2020-09-18 RX ORDER — TOPIRAMATE 50 MG/1
TABLET, FILM COATED ORAL
Qty: 90 TABLET | Refills: 1 | Status: SHIPPED | OUTPATIENT
Start: 2020-09-18 | End: 2021-04-16 | Stop reason: SDUPTHER

## 2021-02-08 ENCOUNTER — OFFICE VISIT (OUTPATIENT)
Dept: FAMILY MEDICINE CLINIC | Facility: CLINIC | Age: 21
End: 2021-02-08
Payer: COMMERCIAL

## 2021-02-08 VITALS
WEIGHT: 143.6 LBS | BODY MASS INDEX: 23.08 KG/M2 | HEART RATE: 92 BPM | DIASTOLIC BLOOD PRESSURE: 86 MMHG | SYSTOLIC BLOOD PRESSURE: 138 MMHG | TEMPERATURE: 99 F | RESPIRATION RATE: 16 BRPM | HEIGHT: 66 IN | OXYGEN SATURATION: 99 %

## 2021-02-08 DIAGNOSIS — Z23 ENCOUNTER FOR VACCINATION: Primary | ICD-10-CM

## 2021-02-08 DIAGNOSIS — E23.7 PITUITARY ABNORMALITY (HCC): ICD-10-CM

## 2021-02-08 DIAGNOSIS — R51.9 FREQUENT HEADACHES: ICD-10-CM

## 2021-02-08 DIAGNOSIS — Z00.00 ANNUAL PHYSICAL EXAM: ICD-10-CM

## 2021-02-08 DIAGNOSIS — Z13.220 SCREENING FOR HYPERLIPIDEMIA: ICD-10-CM

## 2021-02-08 DIAGNOSIS — N92.0 MENORRHAGIA WITH REGULAR CYCLE: ICD-10-CM

## 2021-02-08 DIAGNOSIS — D50.9 IRON DEFICIENCY ANEMIA, UNSPECIFIED IRON DEFICIENCY ANEMIA TYPE: ICD-10-CM

## 2021-02-08 PROCEDURE — 99395 PREV VISIT EST AGE 18-39: CPT | Performed by: FAMILY MEDICINE

## 2021-02-08 PROCEDURE — 90471 IMMUNIZATION ADMIN: CPT

## 2021-02-08 PROCEDURE — 90686 IIV4 VACC NO PRSV 0.5 ML IM: CPT

## 2021-02-08 NOTE — PROGRESS NOTES
[facility] PRACT[facility]    NAME: Dave Wolff  AGE: 21 y o  SEX: female  : 2000     DATE: 2021     Assessment and Plan:     Problem List Items Addressed This Visit        Endocrine    Pituitary abnormality ([address] )     Going to endo  No adenoma seen on MRI  Likely physiologic engargement of adolescence  8am cortisol and ACTH normal  She will f/u with endo in a year for repeat imaging  Other    Menorrhagia with regular cycle     Due to elevated bp, OCP switched to progestin only  Iron deficiency anemia     Well controlled with iron  Continue  Frequent headaches     Improved with topamax  Other Visit Diagnoses     Encounter for vaccination    -  Primary    Relevant Orders    influenza vaccine, quadrivalent, 0 5 mL, preservative-free, for adult and pediatric patients 6 mos+ ([place], [facility], [place], [address] [facility])    Screening for hyperlipidemia        Relevant Orders    Lipid Panel with Direct LDL reflex    Annual physical exam              Immunizations and preventive care screenings were discussed with patient today  Appropriate education was printed on patient's after visit summary  Counseling:  Alcohol/drug use: discussed moderation in alcohol intake, the recommendations for healthy alcohol use, and avoidance of illicit drug use  Dental Health: discussed importance of regular tooth brushing, flossing, and dental visits  · Exercise: the importance of regular exercise/physical activity was discussed  Recommend exercise 3-5 times per week for at least 30 minutes  Return in 1 year (on 2022)  Chief Complaint:     Chief Complaint   Patient presents with    Follow-up     6 month       History of Present Illness:     Adult Annual Physical   Patient here for a comprehensive physical exam  The patient reports no problems      Diet and Physical Activity  · Diet/Nutrition: well balanced diet    · Exercise: moderate cardiovascular exercise  Depression Screening  PHQ-9 Depression Screening    PHQ-9:   Frequency of the following problems over the past two weeks:           General Health  · Sleep: sleeps well  · Hearing: no issues  · Vision: goes for regular eye exams and wears glasses  · Dental: regular dental visits  /GYN Health  · Last menstrual period: 1/20/20  · Contraceptive method: oral contraceptives  · History of STDs?: no   · Paternal grandmother had breast cancer      Review of Systems:     Review of Systems   Constitutional: Negative for fever and unexpected weight change  HENT: Negative for ear pain, sore throat and trouble swallowing  Eyes: Negative for pain and visual disturbance  Respiratory: Negative for cough, chest tightness, shortness of breath and wheezing  Cardiovascular: Negative for chest pain  Gastrointestinal: Negative for abdominal distention, abdominal pain, blood in stool, constipation, diarrhea, nausea and vomiting  Endocrine: Negative for polydipsia and polyuria  Genitourinary: Negative for dysuria and hematuria  Musculoskeletal: Negative for back pain and myalgias  Skin: Negative for rash  Neurological: Negative for syncope and headaches  Psychiatric/Behavioral: Negative for suicidal ideas  Past Medical History:     No past medical history on file     Past Surgical History:     Past Surgical History:   Procedure Laterality Date    ADENOIDECTOMY      TONSILLECTOMY AND ADENOIDECTOMY        Social History:     E-Cigarette/Vaping    E-Cigarette Use Never User      E-Cigarette/Vaping Substances    Nicotine No     THC No     CBD No     Flavoring No     Other No     Unknown No      Social History     Socioeconomic History    Marital status: Single     Spouse name: None    Number of children: None    Years of education: None    Highest education level: None   Occupational History    Occupation: student   Social Needs  Financial resource strain: Not hard at all   [employer] insecurity     Worry: Never true     Inability: Never true   Inaika needs     Medical: No     Non-medical: No   Tobacco Use    Smoking status: Never Smoker    Smokeless tobacco: Never Used   Substance and Sexual Activity    Alcohol use: Not Currently    Drug use: No    Sexual activity: Yes   Lifestyle    Physical activity     Days per week: 1 day     Minutes per session: 60 min    Stress:  Only a little   Relationships    Social connections     Talks on phone: Patient refused     Gets together: Patient refused     Attends Zoroastrianism service: Patient refused     Active member of club or organization: Patient refused     Attends meetings of clubs or organizations: Patient refused     Relationship status: Patient refused    Intimate partner violence     Fear of current or ex partner: Patient refused     Emotionally abused: Patient refused     Physically abused: Patient refused     Forced sexual activity: Patient refused   Other Topics Concern    None   Social History Narrative    None      Family History:     Family History   Problem Relation Age of Onset    Diabetes Father     Hypertension Father     No Known Problems Mother     Hypertension Maternal Grandmother     Breast cancer Paternal Grandmother       Current Medications:     Current Outpatient Medications   Medication Sig Dispense Refill    cholecalciferol (VITAMIN D3) 1,000 units tablet Take 1,000 Units by mouth daily      ferrous sulfate 325 (65 Fe) mg tablet Take 325 mg by mouth daily at bedtime      Fiber Complete TABS Take 2 tablets by mouth daily      Magnesium 400 MG CAPS Take 400 mg by mouth daily      norgestimate-ethinyl estradiol (Tri-Lo-Terrie) 0 18/0 215/0 25 MG-25 MCG per tablet Take 1 tablet by mouth daily 28 tablet 2    rizatriptan (MAXALT-MLT) 10 MG disintegrating tablet Take 1 tablet (10 mg total) by mouth once as needed for migraine for up to 1 dose May repeat in 2 hours if needed 9 tablet 0    topiramate (TOPAMAX) 50 MG tablet 2 tab po qhs 90 tablet 1     No current facility-administered medications for this visit  Allergies: Allergies   Allergen Reactions    Augmentin [Amoxicillin-Pot Clavulanate] Diarrhea    Fish-Derived Products Vomiting      Physical Exam:     /86 (BP Location: Left arm, Patient Position: Sitting, Cuff Size: Standard)   Pulse 92   Temp 99 °F (37 2 °C) (Oral)   Resp 16   Ht 5' 6" (1 676 m)   Wt 65 1 kg (143 lb 9 6 oz)   SpO2 99%   BMI 23 18 kg/m²     Physical Exam  Constitutional:       Appearance: She is well-developed  HENT:      Head: Normocephalic and atraumatic  Eyes:      General: No scleral icterus  Conjunctiva/sclera: Conjunctivae normal       Pupils: Pupils are equal, round, and reactive to light  Neck:      Musculoskeletal: Normal range of motion and neck supple  Cardiovascular:      Rate and Rhythm: Normal rate and regular rhythm  Heart sounds: Normal heart sounds  No murmur  No friction rub  No gallop  Pulmonary:      Effort: Pulmonary effort is normal  No respiratory distress  Breath sounds: No wheezing or rales  Chest:      Chest wall: No tenderness  Abdominal:      General: Bowel sounds are normal  There is no distension  Palpations: Abdomen is soft  There is no mass  Tenderness: There is no abdominal tenderness  Musculoskeletal: Normal range of motion  Lymphadenopathy:      Cervical: No cervical adenopathy  Skin:     General: Skin is warm and dry  Capillary Refill: Capillary refill takes less than 2 seconds  Findings: No rash  Neurological:      Mental Status: She is alert and oriented to person, place, and time  Cranial Nerves: No cranial nerve deficit            Flex Ramos MD   [address] [place] [address]

## 2021-02-08 NOTE — PATIENT INSTRUCTIONS
Kimberlee Chávez   Use debrox for wax   Wellness Visit for Adults   AMBULATORY CARE:   A wellness visit  is when you see your healthcare provider to get screened for health problems  Your healthcare provider will also give you advice on how to stay healthy  Write down your questions so you remember to ask them  Ask your healthcare provider how often you should have a wellness visit  What happens at a wellness visit:  Your healthcare provider will ask about your health, and your family history of health problems  This includes high blood pressure, heart disease, and cancer  He or she will ask if you have symptoms that concern you, if you smoke, and about your mood  You may also be asked about your intake of medicines, supplements, food, and alcohol  Any of the following may be done:  · Your weight  will be checked  Your height may also be checked so your body mass index (BMI) can be calculated  Your BMI shows if you are at a healthy weight  · Your blood pressure  and heart rate will be checked  Your temperature may also be checked  · Blood and urine tests  may be done  Blood tests may be done to check your cholesterol levels  Abnormal cholesterol levels increase your risk for heart disease and stroke  You may also need a blood or urine test to check for diabetes if you are at increased risk  Urine tests may be done to look for signs of an infection or kidney disease  · A physical exam  includes checking your heartbeat and lungs with a stethoscope  Your healthcare provider may also check your skin to look for sun damage  · Screening tests  may be recommended  A screening test is done to check for diseases that may not cause symptoms  The screening tests you may need depend on your age, gender, family history, and lifestyle habits  For example, colorectal screening may be recommended if you are 48years old or older      Screening tests you need if you are a woman:   · A Pap smear  is used to screen for cervical cancer  Pap smears are usually done every 3 to 5 years depending on your age  You may need them more often if you have had abnormal Pap smear test results in the past  Ask your healthcare provider how often you should have a Pap smear  · A mammogram  is an x-ray of your breasts to screen for breast cancer  Experts recommend mammograms every 2 years starting at age 48 years  You may need a mammogram at age 52 years or younger if you have an increased risk for breast cancer  Talk to your healthcare provider about when you should start having mammograms and how often you need them  Vaccines you may need:   · Get an influenza vaccine  every year  The influenza vaccine protects you from the flu  Several types of viruses cause the flu  The viruses change over time, so new vaccines are made each year  · Get a tetanus-diphtheria (Td) booster vaccine  every 10 years  This vaccine protects you against tetanus and diphtheria  Tetanus is a severe infection that may cause painful muscle spasms and lockjaw  Diphtheria is a severe bacterial infection that causes a thick covering in the back of your mouth and throat  · Get a human papillomavirus (HPV) vaccine  if you are female and aged 23 to 32 or male 23 to 24 and never received it  This vaccine protects you from HPV infection  HPV is the most common infection spread by sexual contact  HPV may also cause vaginal, penile, and anal cancers  · Get a pneumococcal vaccine  if you are aged 72 years or older  The pneumococcal vaccine is an injection given to protect you from pneumococcal disease  Pneumococcal disease is an infection caused by pneumococcal bacteria  The infection may cause pneumonia, meningitis, or an ear infection  · Get a shingles vaccine  if you are 60 or older, even if you have had shingles before  The shingles vaccine is an injection to protect you from the varicella-zoster virus  This is the same virus that causes chickenpox   Shingles is a painful rash that develops in people who had chickenpox or have been exposed to the virus  How to eat healthy:  My Plate is a model for planning healthy meals  It shows the types and amounts of foods that should go on your plate  Fruits and vegetables make up about half of your plate, and grains and protein make up the other half  A serving of dairy is included on the side of your plate  The amount of calories and serving sizes you need depends on your age, gender, weight, and height  Examples of healthy foods are listed below:  · Eat a variety of vegetables  such as dark green, red, and orange vegetables  You can also include canned vegetables low in sodium (salt) and frozen vegetables without added butter or sauces  · Eat a variety of fresh fruits , canned fruit in 100% juice, frozen fruit, and dried fruit  · Include whole grains  At least half of the grains you eat should be whole grains  Examples include whole-wheat bread, wheat pasta, brown rice, and whole-grain cereals such as oatmeal     · Eat a variety of protein foods such as seafood (fish and shellfish), lean meat, and poultry without skin (turkey and chicken)  Examples of lean meats include pork leg, shoulder, or tenderloin, and beef round, sirloin, tenderloin, and extra lean ground beef  Other protein foods include eggs and egg substitutes, beans, peas, soy products, nuts, and seeds  · Choose low-fat dairy products such as skim or 1% milk or low-fat yogurt, cheese, and cottage cheese  · Limit unhealthy fats  such as butter, hard margarine, and shortening  Exercise:  Exercise at least 30 minutes per day on most days of the week  Some examples of exercise include walking, biking, dancing, and swimming  You can also fit in more physical activity by taking the stairs instead of the elevator or parking farther away from stores  Include muscle strengthening activities 2 days each week  Regular exercise provides many health benefits   It helps you manage your weight, and decreases your risk for type 2 diabetes, heart disease, stroke, and high blood pressure  Exercise can also help improve your mood  Ask your healthcare provider about the best exercise plan for you  General health and safety guidelines:   · Do not smoke  Nicotine and other chemicals in cigarettes and cigars can cause lung damage  Ask your healthcare provider for information if you currently smoke and need help to quit  E-cigarettes or smokeless tobacco still contain nicotine  Talk to your healthcare provider before you use these products  · Limit alcohol  A drink of alcohol is 12 ounces of beer, 5 ounces of wine, or 1½ ounces of liquor  · Lose weight, if needed  Being overweight increases your risk of certain health conditions  These include heart disease, high blood pressure, type 2 diabetes, and certain types of cancer  · Protect your skin  Do not sunbathe or use tanning beds  Use sunscreen with a SPF 15 or higher  Apply sunscreen at least 15 minutes before you go outside  Reapply sunscreen every 2 hours  Wear protective clothing, hats, and sunglasses when you are outside  · Drive safely  Always wear your seatbelt  Make sure everyone in your car wears a seatbelt  A seatbelt can save your life if you are in an accident  Do not use your cell phone when you are driving  This could distract you and cause an accident  Pull over if you need to make a call or send a text message  · Practice safe sex  Use latex condoms if are sexually active and have more than one partner  Your healthcare provider may recommend screening tests for sexually transmitted infections (STIs)  · Wear helmets, lifejackets, and protective gear  Always wear a helmet when you ride a bike or motorcycle, go skiing, or play sports that could cause a head injury  Wear protective equipment when you play sports  Wear a lifejacket when you are on a boat or doing water sports      © Copyright [facility] Information is for [employer] use only and may not be sold, redistributed or otherwise used for commercial purposes  All illustrations and images included in CareNotes® are the copyrighted property of A D A M , Inc  or Kandice Anand   The above information is an  only  It is not intended as medical advice for individual conditions or treatments  Talk to your doctor, nurse or pharmacist before following any medical regimen to see if it is safe and effective for you

## 2021-02-08 NOTE — ASSESSMENT & PLAN NOTE
Going to endo  No adenoma seen on MRI  Likely physiologic engargement of adolescence  8am cortisol and ACTH normal  She will f/u with endo in a year for repeat imaging

## 2021-02-10 ENCOUNTER — TELEPHONE (OUTPATIENT)
Dept: NEUROLOGY | Facility: CLINIC | Age: 21
End: 2021-02-10

## 2021-02-10 DIAGNOSIS — N92.0 MENORRHAGIA WITH REGULAR CYCLE: ICD-10-CM

## 2021-02-10 DIAGNOSIS — R51.9 ACUTE NONINTRACTABLE HEADACHE, UNSPECIFIED HEADACHE TYPE: ICD-10-CM

## 2021-02-10 RX ORDER — NORGESTIMATE AND ETHINYL ESTRADIOL 7DAYSX3 LO
1 KIT ORAL DAILY
Qty: 28 TABLET | Refills: 11 | Status: SHIPPED | OUTPATIENT
Start: 2021-02-10 | End: 2022-02-01

## 2021-02-10 RX ORDER — RIZATRIPTAN BENZOATE 10 MG/1
10 TABLET, ORALLY DISINTEGRATING ORAL ONCE AS NEEDED
Qty: 9 TABLET | Refills: 5 | Status: SHIPPED | OUTPATIENT
Start: 2021-02-10 | End: 2021-04-16 | Stop reason: SDUPTHER

## 2021-02-10 NOTE — TELEPHONE ENCOUNTER
----- Message from [address] [place] [address]  Dillon sent at 2/10/2021  1:56 PM EST -----  Regarding: Prescription Question  Contact: [phone]  Hi!!! I completely forgot to mention that I don't have any refills left on my Rizatriptan or my birth control  I have about a month left on my birth control, and I use the Rizatriptan as needed, so I'm not worried about running out anytime soon, but I would like to be able to call in my prescriptions more easily  Could I get refills on both of those, please?  Thank you so much in advance!!

## 2021-02-10 NOTE — TELEPHONE ENCOUNTER
Swetha Zimmer called to convert to video [id number] as transportation issues and due to recent [id number] exposure she will have test [id number] scheduled in teams  after we hung up I tried to convert the visit to virtual and would not allow as Carlos Comer on pto  GIL will reach out to Swetha Zimmer to reschedule

## 2021-03-31 DIAGNOSIS — Z23 ENCOUNTER FOR IMMUNIZATION: ICD-10-CM

## 2021-04-01 ENCOUNTER — IMMUNIZATIONS (OUTPATIENT)
Dept: FAMILY MEDICINE CLINIC | Facility: HOSPITAL | Age: 21
End: 2021-04-01

## 2021-04-01 DIAGNOSIS — Z23 ENCOUNTER FOR IMMUNIZATION: Primary | ICD-10-CM

## 2021-04-01 PROCEDURE — 91300 SARS-COV-2 / COVID-19 MRNA VACCINE (PFIZER-BIONTECH) 30 MCG: CPT

## 2021-04-01 PROCEDURE — 0001A SARS-COV-2 / COVID-19 MRNA VACCINE (PFIZER-BIONTECH) 30 MCG: CPT

## 2021-04-16 ENCOUNTER — OFFICE VISIT (OUTPATIENT)
Dept: NEUROLOGY | Facility: CLINIC | Age: 21
End: 2021-04-16
Payer: COMMERCIAL

## 2021-04-16 VITALS
DIASTOLIC BLOOD PRESSURE: 62 MMHG | BODY MASS INDEX: 22.6 KG/M2 | HEART RATE: 80 BPM | WEIGHT: 140 LBS | SYSTOLIC BLOOD PRESSURE: 110 MMHG

## 2021-04-16 DIAGNOSIS — R51.9 FREQUENT HEADACHES: ICD-10-CM

## 2021-04-16 DIAGNOSIS — R51.9 ACUTE NONINTRACTABLE HEADACHE, UNSPECIFIED HEADACHE TYPE: ICD-10-CM

## 2021-04-16 PROCEDURE — 99213 OFFICE O/P EST LOW 20 MIN: CPT | Performed by: PSYCHIATRY & NEUROLOGY

## 2021-04-16 RX ORDER — TOPIRAMATE 25 MG/1
TABLET ORAL
Qty: 270 TABLET | Refills: 3 | Status: SHIPPED | OUTPATIENT
Start: 2021-04-16 | End: 2021-10-15 | Stop reason: SDUPTHER

## 2021-04-16 RX ORDER — RIZATRIPTAN BENZOATE 10 MG/1
10 TABLET, ORALLY DISINTEGRATING ORAL ONCE AS NEEDED
Qty: 9 TABLET | Refills: 5 | Status: SHIPPED | OUTPATIENT
Start: 2021-04-16 | End: 2022-05-17

## 2021-04-16 NOTE — ASSESSMENT & PLAN NOTE
Pt here for neuro follow up  Overall doing well since last visit  Pt in [place] year at [employer] notes since addition of topamax decreased freq of headaches  Now only about 5 headaches per month  Pt does note severity about the same  Will trial with increase of topamax to 75 mg hs  Will go back to 25 mg strength to help with titration  Pt to call for any issues with paresthesias of the hands  No significant issue with weight loss  Pt is actually trying to diet some  Exam normal  Refilled maxalt today too  Pt is aware that topamax may lower efficacy of her bcp

## 2021-04-16 NOTE — PROGRESS NOTES
Patient ID: Yocasta Singh is a 21 y o  female  Assessment/Plan:    Frequent headaches  Pt here for neuro follow up  Overall doing well since last visit  Pt in [employer] year at [employer] notes since addition of topamax decreased freq of headaches  Now only about 5 headaches per month  Pt does note severity about the same  Will trial with increase of topamax to 75 mg hs  Will go back to 25 mg strength to help with titration  Pt to call for any issues with paresthesias of the hands  No significant issue with weight loss  Pt is actually trying to diet some  Exam normal  Refilled maxalt today too  Pt is aware that topamax may lower efficacy of her bcp    Pituitary abnormality (HCC)  Pt follows with endo   Last seen in summer  Pt also had optho eval in aug as well  Pt scheduled for annual follow up for both optho and endo       Diagnoses and all orders for this visit:    Acute nonintractable headache, unspecified headache type  Comments:  probable episodic migraines, rx issued for triptan to use prn and advise ibuprofen 400-600mg prn less severe headaches  Orders:  -     rizatriptan (MAXALT-MLT) 10 MG disintegrating tablet; Take 1 tablet (10 mg total) by mouth once as needed for migraine for up to 1 dose May repeat in 2 hours if needed    Frequent headaches  -     topiramate (TOPAMAX) 25 mg tablet; 3 tab po qhs           Subjective:    HPI    Yocasta Singh is a 21 y o  female  with pmh of menorrhagia and iron deficiency who presents today for eval of her headaches    pt last seen on 8/28/20 by me   Per my last note "Pt notes long standing headaches for the past 7 yrs since middle school   Pt notes family history of migraines as well   No other focal associated sxs   Pt notes she is at school as a sophomore at [employer] and home now due to corona and school closed   Pt notes she has been keeping headache diary and has about 1-2 headaches per week   Pt has used nsaids but has decreased this usage and uses maxalt for breathru rxed by her pcp which works very effectively for her   Pt notes sleeping and dark room helps   No n or v  Pt notes family history of migraines in her mom   Pt also with some heart disease in her family rufino on dad side   Rev with pt need to be watchful with triptans for any chest discomfort or sob "  Kept above paragraph  Pt last seen in aug for her migraines  Pt notes overall doing well  Pt notes decreased number or headaches  Now usually about 5 pr month  Pt has not noted change in her severity of headaches  Pt currently on 50 mg topamax since last visit  Overall bridget med well  Min paresthesias of the upper ext  Pt notes no significant weight loss  Pt is actually trying to lose weight as well  Pt denies any new sxs  No falls or trips  No change in bowel or bladder  No LOC, no seizure  No change in speech or swallowing  No change in vision  No loss of vision  No diplopia  No loss of vision  No recent hospitalizations  No recent infections  No fever or chills  No cough or sob  No close covid contacts  Pt currently [employer] year of college at [employer]  Pt notes end of semester in early may  Pt willing to trial slight increase in topamax to 75 mg hs to see if help with severity  Pt is aware topamax may decrease efficacy of her bcp  Pt is aware  Pt also following closely with endo  Pt goes annually to optho and endo for follow up of her known pituitary abn  Pt last seen by optho in summer and no significant issues  Pt notes she had vf testing done as well  Pt will be going back to optometrist after school over for rx sun glasses and follow as well  Refilled maxalt med as well at appt today     Pt remains aware that she should not concieve on this medication and pt has no plans for upcoming pregnancy       The following portions of the patient's history were reviewed and updated as appropriate: allergies, current medications, past family history, past medical history, past social history, past surgical history and problem list and med rec and ros rev  Objective:    Blood pressure 110/62, pulse 80, weight 63 5 kg (140 lb)  Physical Exam  Constitutional:       General: She is not in acute distress  Appearance: She is not ill-appearing  Musculoskeletal:      Right lower leg: No edema  Left lower leg: No edema  Neurological:      Coordination: Coordination is intact  Deep Tendon Reflexes: Strength normal and reflexes are normal and symmetric  Psychiatric:         Speech: Speech normal          Neurological Exam  Mental Status  Awake, alert and oriented to person, place and time  Recent and remote memory are intact  Speech is normal  Language is fluent with no aphasia  Attention and concentration are normal  Fund of knowledge is appropriate for level of education  Cranial Nerves  CN I: Sense of smell is normal   CN II: Right normal visual field  Left normal visual field  Right funduscopic exam: disc intact  Left funduscopic exam: disc intact  Motor  Normal muscle bulk throughout  Normal muscle tone  No abnormal involuntary movements  Strength is 5/5 throughout all four extremities  Sensory  Sensation is intact to light touch, pinprick, vibration and proprioception in all four extremities  Reflexes  Deep tendon reflexes are 2+ and symmetric in all four extremities with downgoing toes bilaterally  Coordination  Finger-to-nose, rapid alternating movements and heel-to-shin normal bilaterally without dysmetria  Gait  Normal casual, toe, heel and tandem gait  ROS:    Review of Systems   Constitutional: Negative  Negative for appetite change and fever  HENT: Negative  Negative for hearing loss, tinnitus, trouble swallowing and voice change  Eyes: Negative  Negative for photophobia and pain  Respiratory: Negative  Negative for shortness of breath  Cardiovascular: Negative  Negative for palpitations  Gastrointestinal: Negative    Negative for nausea and vomiting  Endocrine: Negative  Negative for cold intolerance  Genitourinary: Negative  Negative for dysuria, frequency and urgency  Musculoskeletal: Negative  Negative for myalgias and neck pain  Skin: Negative  Negative for rash  Neurological: Positive for headaches (getting less headaches)  Negative for dizziness, tremors, seizures, syncope, facial asymmetry, speech difficulty, weakness, light-headedness and numbness  Hematological: Negative  Does not bruise/bleed easily  Psychiatric/Behavioral: Negative  Negative for confusion, hallucinations and sleep disturbance

## 2021-04-16 NOTE — ASSESSMENT & PLAN NOTE
Pt follows with endo   Last seen in summer  Pt also had optho eval in aug as well  Pt scheduled for annual follow up for both optho and endo

## 2021-04-24 ENCOUNTER — IMMUNIZATIONS (OUTPATIENT)
Dept: FAMILY MEDICINE CLINIC | Facility: HOSPITAL | Age: 21
End: 2021-04-24

## 2021-04-24 DIAGNOSIS — Z23 ENCOUNTER FOR IMMUNIZATION: Primary | ICD-10-CM

## 2021-04-24 PROCEDURE — 91300 SARS-COV-2 / COVID-19 MRNA VACCINE (PFIZER-BIONTECH) 30 MCG: CPT

## 2021-04-24 PROCEDURE — 0002A SARS-COV-2 / COVID-19 MRNA VACCINE (PFIZER-BIONTECH) 30 MCG: CPT

## 2021-05-10 ENCOUNTER — TELEPHONE (OUTPATIENT)
Dept: NEUROLOGY | Facility: CLINIC | Age: 21
End: 2021-05-10

## 2021-05-10 NOTE — TELEPHONE ENCOUNTER
Ok to stay at 50mg but please have her keep us posted and we can help with further taper if needed  Watch for both the paresthesias and any cogntive issues

## 2021-05-10 NOTE — TELEPHONE ENCOUNTER
I've been using the 75mg of Topamax, and while I haven't noticed any change in my headaches - I've gotten one still - I have noticed the side effects a lot more strongly  I know it's not been particularly long, but I was wondering if we could consider going back down  I know we discussed the fingers being tingly, and it's gotten a lot worse, to the point where I cannot focus at all on the matter at hand, and I've also noticed much more brain fog   I think the negatives are outweighing the positives, so I'd really like to switch back down to the 50mg again

## 2021-05-10 NOTE — TELEPHONE ENCOUNTER
Called pt and made her aware  She is asking if she can remain on 50mg HS of topamax instead? States her side effects greatly increased at the 75mg dose and her headaches remained the same  States she was happy with where she was at the 50mg dose and tingling sensation was not bothersome for her on that dose  Please advise     [phone]  Okay to leave detailed message

## 2021-05-10 NOTE — TELEPHONE ENCOUNTER
Please let pt know we should taper medicine to off and find a new migraine med for her  Recommend decreasing by 25 mg every 3 days and then discontinue by end of one week  Please have her call us back in about 2 weeks to see if tingling resolved

## 2021-05-11 ENCOUNTER — TELEPHONE (OUTPATIENT)
Dept: PSYCHIATRY | Facility: CLINIC | Age: 21
End: 2021-05-11

## 2021-05-11 NOTE — TELEPHONE ENCOUNTER
Called patient and let her know that we do have a wait list for therapy right now and that we are requiring a referral for our office   She's supposed to call back once the referrals in the system

## 2021-05-14 ENCOUNTER — TELEPHONE (OUTPATIENT)
Dept: FAMILY MEDICINE CLINIC | Facility: CLINIC | Age: 21
End: 2021-05-14

## 2021-05-14 ENCOUNTER — TELEPHONE (OUTPATIENT)
Dept: PSYCHIATRY | Facility: CLINIC | Age: 21
End: 2021-05-14

## 2021-05-14 DIAGNOSIS — F41.9 ANXIETY: Primary | ICD-10-CM

## 2021-05-14 NOTE — TELEPHONE ENCOUNTER
Pt called looking to set up w/ a psychiatrist  I informed pt  call ORALIA Barrios to put in a referral in the sytem   Once in I will forward information to Intake

## 2021-05-14 NOTE — TELEPHONE ENCOUNTER
Patient seeing school counselor/therapist and soon she will be finishing up school and would like to continue seeing a therapist so she is asking for  Referral to [facility] in [place]

## 2021-05-21 NOTE — TELEPHONE ENCOUNTER
Behavorial Health Outpatient Intake Questions    Referred by: PCP    Please advised interviewee that they need to answer all questions truthfully to allow for best care and any misrepresentations of information may affect their ability to be seen at this clinic   => Was this discussed? Yes     [facility] -     Presenting Problem (in patient's words): Earlier in the semester this year she had to quarantine alone in her room due to exposure  During that time she became depressed saw a counselor at school and felt it would be best to get diagnosed in order to treat the depression  Are there any developmental disabilities? ? If yes, can they speak to you on the phone? If they are too limited to speak to you on phone, refer out No    Are you taking any psychiatric medications? No    => If yes, who prescribes? If yes, are they injectable medications? Does the patient have a language barrier or hearing impairment? No    Have you been treated at [facility] by a therapist or a doctor in the past? If yes, who? No    Has the patient been hospitalized for mental health? No   If yes, how long ago was last hospitalization and where was it? Do you actively use alcohol or marijuana or illegal substances? If yes, what and how much - refer out to Drug and alcohol treatment if use is excessive or daily use of illegal substances No concerns of substance abuse are reported  Do you have a community treatment team or ? No    Legal History-     Does the patient have any history of arrests, retirement/long term time, or DUIs? No  If Yes-  1) What types of charges? 2) When were they last incarcerated? 3) Are they currently on parole or probation? Minor Child-    Who has custody of the child? Is there a custody agreement? If there is a custody agreement remind parent that they must bring a copy to the first appt or they will not be seen       Intake Team, please check with provider before scheduling if flags come up such as:  - complex case  - legal history (other than DUI)  - communication barrier concerns are present  - if, in your judgment, this needs further review    ACCEPTED as a patient Yes  => Appointment Date: Tuesday July 6,2021 at 1:00pm with Singh Nixon    Referred Elsewhere? No    Name of Insurance Co: Wilberto Denise ID# [id number]  Insurance Phone #  If ins is primary or secondary Primary   If patient is a minor, parents information such as Name, D  O B of guarantor

## 2021-07-06 ENCOUNTER — OFFICE VISIT (OUTPATIENT)
Dept: PSYCHIATRY | Facility: CLINIC | Age: 21
End: 2021-07-06
Payer: COMMERCIAL

## 2021-07-06 DIAGNOSIS — F41.9 ANXIETY: ICD-10-CM

## 2021-07-06 DIAGNOSIS — F39 MOOD DISORDER (HCC): Primary | ICD-10-CM

## 2021-07-06 PROCEDURE — 90792 PSYCH DIAG EVAL W/MED SRVCS: CPT | Performed by: NURSE PRACTITIONER

## 2021-07-06 NOTE — PSYCH
TREATMENT PLAN (Medication Management Only)        [address]    Name and Date of Birth:  Janell West 24 y o  2000  Date of Treatment Plan: July 6, 2021  Diagnosis/Diagnoses:    1  Mood disorder ([address] )    2  Anxiety      Strengths/Personal Resources for Self-Care: supportive family, supportive friends  Area/Areas of need (in own words): anxiety symptoms  1  Long Term Goal: to become less anxious   Target Date: 6 months - 1/6/2022  Person/Persons responsible for completion of goal: Rowan Adams  2  Short Term Objective (s) - How will we reach this goal?:   A  Provider new recommended medication/dosage changes and/or continue medication(s): No meds for now  B   N/A  Target Date: 6 months - 1/6/2022  Person/Persons Responsible for Completion of Goal: Rowan Adams  Progress Towards Goals: starting treatment  Treatment Modality: referral for individual psychotherapy  Review due 6 months from date of this plan: 6 months - 1/6/2022  Expected length of service: 1 year  My Physician/PA/NP and I have developed this plan together and I agree to work on the goals and objectives  I understand the treatment goals that were developed for my treatment

## 2021-07-06 NOTE — PSYCH
[address]    Name and Date of Birth:  Rdaha Knox 24 y o  2000 MRN: [id number]    Date of Visit: July 6, 2021    Reason for visit:     A psychiatric evaluation for treatment of anxiety disorder  HPI   Patient is a 49-year-old female with a history of anxiety disorder  Currently, she is a senior at [employer] and she is studying literature and international relations  She has been seeing a counselor at [employer] but that ended with the semester ended  She still reports feelings of anxiety, worry and self-doubt  Most of her presentation is surrounding self-doubt  She does not feel her so for the for her voice to be heard  She is not suicidal   She has never had a suicide attempt  There is no history of family suicide  At this time, she does not report lack of energy lack of interest or lack of motivation  Sometimes she does retreat to her room because she is unhappy with the way she is treated by some friends or family  She then tends to isolate herself  She felt that therapy did give her benefit and she would like to continue that  We made a referral today  Some her anxiety stems from her family  She would like to be PhD in literature and her family wants her to be a   She tells me no matter how much she tries to explain to them how much she enjoys literature, there still worried about her financially  Right now, they are paying the tuition for her at [employer]  She would like to  the cost to tuition for her advanced degrees then she can make her own choices to what courses she would like take  Also, she did contract COVID  While she was in school and had isolate for 2 weeks  During this time of isolation, she felt alone and depressed and sad  She felt "like I had no one " she said she virtually through those issues and mostly now it is anxiety    She feels like no one listens to her and her id is because she feels she is not "worthy "of an opinion  In further exploration, the patient reported that when she was 15years old, she asked her mother to get her into some type of counseling in therapy but that did not occur  Sometimes she has trouble getting to sleep but sleeps fairly well  Sometimes she does have trouble awakening  Sometimes trouble with focusing but for the most part, with her literature course she does well  There  Are no outstanding symptoms of attention deficit disorder or depression  We had a discussion regarding treatment and I believe she would benefit the most from therapy  She agrees  Will  However, see her in 1 month or sooner if necessary to see how she is doing  Past psychiatric history:  None  Family psychiatric history:  Patient reports her grandmother suffers from anxiety issues  Given the description the patient provides, grandmother would be probable agoraphobic  Access to weapons:  None   Drug and alcohol history:  None  Legal history:  None  Diagnosis:  Generalized anxiety disorder  Treatment plan:  Patient prefers therapy at this time  No medications will be started  We will re-evaluate 1 month  Tg Najera is a 24 y o  female with a history of Generalized Anxiety Disorder who presents for psychiatric evaluation due to anxiety symptoms  Primary complaints include ANXIETY SYMPTOMS: feeling nervous  Symptoms first started gradually several years ago and gradually worsened over the last several months  Stressors preceding visit included Stress at school with peers and also stress with family  Anthony Shepherd today for treatment of generalized anxiety disorder  She is a senior at [employer]  She reports she is getting A's and B's  This is in literature which she enjoys  She has a history of anxiety and   History of worry and fear     She fears her own inner voice and cannot find it at this point    She lacks confidence and jeannie in herself and her feelings  She is a good candidate for therapy  No medications to be started at this time  She denies suicidal ideation, intent or plan, denies homicidal ideation, intent or plan  She denies auditory hallucinations, denies visual hallucinations, denies overt delusions  She denies any side effects from medications  Georgette Ormond HPI ROS Appetite Changes and Sleep:     She reports fluctuating sleep pattern, Patient has lost 50 pounds over the last year  This was intentional   She is  practicing habits and exercise, normal energy level    Current Rating Scores:     None completed today      Psychiatric Review Of Systems:    Sleep changes: yes, decreased  Appetite changes: no  Weight changes: yes, decreased  Energy/anergy: no  Interest/pleasure/anhedonia: no  Somatic symptoms: no  Anxiety/panic: yes  Debbie: no  Guilty/hopeless: no  Self injurious behavior/risky behavior: no  Suicidal ideation: no  Homicidal ideation: no  Auditory hallucinations: no  Visual hallucinations: no  Other hallucinations: no  Delusional thinking: no  Eating disorder history: no  Obsessive/compulsive symptoms: no    Review Of Systems:    Mood anxiety   Behavior cooperative   Thought Content daily anxiety feelings and daily worries   General emotional problems   Personality change in personality   Other Psych Symptoms normal   Constitutional negative   ENT negative   Cardiovascular negative   Respiratory negative   Gastrointestinal negative   Genitourinary negative   Musculoskeletal negative   Integumentary negative   Neurological negative   Endocrine negative   Other Symptoms none, all other systems are negative       Past Psychiatric History:     Past Inpatient Psychiatric Treatment:   No history of past inpatient psychiatric admissions  Past Outpatient Psychiatric Treatment:    Has never seen a psychiatrist in the past  Past Suicide Attempts: no  Past Violent Behavior: no  Past Psychiatric Medication Trials: none    Traumatic History:     Abuse: no history of physical or sexual abuse  Other Traumatic Events: none     Family Psychiatric History:     Family History   Problem Relation Age of Onset    Diabetes Father     Hypertension Father     No Known Problems Mother     Hypertension Maternal Grandmother     Breast cancer Paternal Grandmother        Substance Use History:    Social History     Substance and Sexual Activity   Alcohol Use Not Currently     Social History     Substance and Sexual Activity   Drug Use No       Social History:    Social History     Socioeconomic History    Marital status: Single     Spouse name: Not on file    Number of children: Not on file    Years of education: Not on file    Highest education level: Not on file   Occupational History    Occupation: student   Tobacco Use    Smoking status: Never Smoker    Smokeless tobacco: Never Used   Vaping Use    Vaping Use: Never used   Substance and Sexual Activity    Alcohol use: Not Currently    Drug use: No    Sexual activity: Yes   Other Topics Concern    Not on file   Social History Narrative    Not on file     Social Determinants of Health     Financial Resource Strain: Low Risk     Difficulty of Paying Living Expenses: Not hard at all   Food Insecurity: No Food Insecurity    Worried About Running Out of Food in the Last Year: Never true    Edi of Food in the Last Year: Never true   Transportation Needs: No Transportation Needs    Lack of Transportation (Medical): No    Lack of Transportation (Non-Medical):  No   Physical Activity:     Days of Exercise per Week:     Minutes of Exercise per Session:    Stress:     Feeling of Stress :    Social Connections:     Frequency of Communication with Friends and Family:     Frequency of Social Gatherings with Friends and Family:     Attends Buddhism Services:     Active Member of Clubs or Organizations:     Attends Club or Organization Meetings:     Marital Status:    Intimate Partner Violence:     Fear of Current or Ex-Partner:     Emotionally Abused:     Physically Abused:     Sexually Abused:        Past Medical History:    No past medical history on file  Past Medical History Pertinent Negatives:   Diagnosis Date Noted    Anxiety 08/13/2018    Depression 08/13/2018    Substance abuse ([facility] [address] ) 08/13/2018     Past Surgical History:   Procedure Laterality Date    ADENOIDECTOMY      TONSILLECTOMY AND ADENOIDECTOMY       Allergies   Allergen Reactions    Augmentin [Amoxicillin-Pot Clavulanate] Diarrhea    Fish-Derived Products - Food Allergy Vomiting       History Review:    Chart reviewed    OBJECTIVE:    Vital signs in last 24 hours: There were no vitals filed for this visit      Mental Status Evaluation:    Appearance age appropriate, casually dressed   Behavior cooperative, appears anxious   Speech normal rate, normal volume, normal pitch   Mood anxious   Affect constricted   Thought Processes flight of ideas   Associations flight of ideas, perseverative   Thought Content no overt delusions   Perceptual Disturbances: no auditory hallucinations, no visual hallucinations   Abnormal Thoughts  Risk Potential Suicidal ideation - None  Homicidal ideation - None  Potential for aggression - No   Orientation oriented to person, place, time/date and situation   Memory recent and remote memory grossly intact   Consciousness alert and awake   Attention Span Concentration Span normal attention span  decreased concentration   Intellect appears to be of average intelligence   Insight fair   Judgement fair   Muscle Strength and  Gait normal muscle strength and normal muscle tone, normal gait and normal balance   Motor Activity no abnormal movements   Language no difficulty naming common objects, no difficulty repeating a phrase, no difficulty writing a sentence   Fund of Knowledge adequate knowledge of current events  adequate fund of knowledge regarding past history  adequate fund of knowledge regarding vocabulary    Pain none   Pain Scale 0       Laboratory Results: I have personally reviewed all pertinent laboratory/tests results    Recent Labs (last 2 months):   No visits with results within 2 Month(s) from this visit  Latest known visit with results is:   Appointment on 09/08/2020   Component Date Value    Cortisol, Random 09/08/2020 31 4     ACTH 09/08/2020 14 2        Suicide/Homicide Risk Assessment:    Risk of Harm to Self:  Based on today's assessment, Vane Ramos presents the following risk of harm to self: none    Risk of Harm to Others:  Based on today's assessment, Vane Ramos presents the following risk of harm to others: none    The following interventions are recommended: referral for psychotherapy    Assessment/Plan:      Diagnoses and all orders for this visit:    Mood disorder ([facility] [address] )    Anxiety  -     Ambulatory referral to Psychiatry          Treatment Recommendations/Precautions:      Referred to psychotherapy  Patient will be on no medicines at this time  Follow-up in 4 weeks  Aware of 24 hour and weekend coverage for urgent situations accessed by calling [phone] [address] main practice number    Medications Risks/Benefits:      Risks, Benefits And Possible Side Effects Of Medications: On no meds currently    Controlled Medication Discussion:     Not applicable    Treatment Plan:    Completed and signed during the session: Yes - Treatment Plan done but not signed at time of office visit due to:  Plan reviewed in person and verbal consent given due to [address] distancing    This note was shared with patient      OMER Smallwood 07/06/21

## 2021-07-13 ENCOUNTER — TELEPHONE (OUTPATIENT)
Dept: PSYCHIATRY | Facility: CLINIC | Age: 21
End: 2021-07-13

## 2021-07-13 NOTE — TELEPHONE ENCOUNTER
Patient called and wanted to let you know that none of the therapist contacted her to set up an apt   I told her we have a current wait list for therapy right now that she can be put on

## 2021-07-27 ENCOUNTER — OFFICE VISIT (OUTPATIENT)
Dept: OBGYN CLINIC | Facility: CLINIC | Age: 21
End: 2021-07-27
Payer: COMMERCIAL

## 2021-07-27 VITALS
WEIGHT: 153 LBS | BODY MASS INDEX: 24.01 KG/M2 | HEIGHT: 67 IN | SYSTOLIC BLOOD PRESSURE: 148 MMHG | DIASTOLIC BLOOD PRESSURE: 76 MMHG

## 2021-07-27 DIAGNOSIS — N93.0 BLEEDING AFTER INTERCOURSE: Primary | ICD-10-CM

## 2021-07-27 PROCEDURE — 99202 OFFICE O/P NEW SF 15 MIN: CPT | Performed by: OBSTETRICS & GYNECOLOGY

## 2021-07-27 NOTE — PATIENT INSTRUCTIONS
Topic:  Bleeding after intercourse      we discussed various causes of bleeding after intercourse including cervical version and underlying infection as well as anatomic causes such as polyps  At this time we were unable to do a pelvic examination due to patient having her menstrual cycle and menstrual cup in place  We will reschedule her at which time we will complete our physical examination as well as a baseline pelvic ultrasound        All questions were answered in detail for patient during today's visit     She was told to call for any problems, questions, issues or concerns which may come up for her

## 2021-07-27 NOTE — TELEPHONE ENCOUNTER
Pt LVM that she is waiting for a call back for a therapist  She has been added to the wait list and will receive a call when Intake has an opening  Daniel Natarajan pt also stated she needs a call back because she stated "Things are getting worse and feels she needs medication"     Please call @ [phone]

## 2021-07-27 NOTE — TELEPHONE ENCOUNTER
Nursing made follow up call and spoke with Lisa Mclaughlin (In Mami's absence)   She reports things have been "rough lately" Nursing asked Lisa Mclaughlin to elaborate  She reports it is "harder to get out of bed in the morning" She has no motivation  She has been having "trouble falling asleep" and feels "restless" She said when she does finally fall asleep and she wakes in the middle of the night, she has trouble getting back to sleep  She reports her appetite is normal, but when feeling stressed, like now, she tends to eat more than she should  She denies suicidal thoughts  She reports increased anger towards housemates  Lisa Mclaughlin reports "getting annoyed at little things" with housemates and parents  She said with the housemates she goes up in her room to "simmer" and does not react  With her parents she sometimes reacts  Lisa Mclaughlin can not identify any triggers or changes that would contribute to these feelings  She "just feels more frustrated"     She currently takes Topamax  She said you both discussed possibly starting Prozac 10 mg  She feels she may need this  She uses [employer]       Please review

## 2021-07-27 NOTE — PROGRESS NOTES
Assessment/Plan:    No problem-specific Assessment & Plan notes found for this encounter  Diagnoses and all orders for this visit:    Bleeding after intercourse    Other orders  -     Cancel: Liquid-based pap, screening        Subjective:      Patient ID: Alejandra Davis is a 24 y o  female  Patient is a 27-year-old female who presents today complaining of vaginal bleeding following intercourse  Patient is currently on a low-dose oral contraceptive and reports that her periods have been very well tolerated  They last for several days and are light and not accompanied by any significant bleeding or clotting  She also denies any significant pain or cramping as well  She denies any bleeding or spotting between cycles  Patient states that she has noticed episodes of spotting or light bleeding after intercourse  She denies any other abnormal bleeding episodes  Patient denies any significant urinary symptoms including frequency, urgency or hematuria  She also denies any other significant symptoms including fever shakes or chills    She is treated medically with Topamax or Maxalt for migraines and achieves excellent results with that    I advised her that we will examine her cervix carefully to see whether she may have cervical version which would predispose her to spotting with intercourse     We will also order a pelvic ultrasound to determine whether any other abnormalities were  noted within the reproductive tract as well     all questions were answered for her during today's visit     We were not able to perform pelvic examination today because patient is having her menstrual cycle and has a menstrual cup in place     Patient will be scheduled for pelvic ultrasound in near future or which time we will also see her to complete a yearly examination as well      Patient was told to call for any problems, questions, issues or concerns which may arise for her during the interim       Total time of today's visit was 25 minutes of which greater than 50% was spent face-to-face counseling the patient as well as coordination of care, review of patient's history as well as computer entry into the  [facility] medical record system  The following portions of the patient's history were reviewed and updated as appropriate: allergies, current medications, past family history, past medical history, past social history, past surgical history and problem list     Review of Systems   Constitutional: Negative  Negative for appetite change, diaphoresis, fatigue, fever and unexpected weight change  HENT: Negative  Eyes: Negative  Respiratory: Negative  Cardiovascular: Negative  Gastrointestinal: Negative  Negative for abdominal pain, blood in stool, constipation, diarrhea, nausea and vomiting  Endocrine: Negative  Negative for cold intolerance and heat intolerance  Genitourinary: Negative  Negative for dysuria, frequency, hematuria, urgency, vaginal bleeding, vaginal discharge and vaginal pain  Musculoskeletal: Negative  Skin: Negative  Allergic/Immunologic: Negative  Neurological: Negative  Followed for migraines   Hematological: Negative  Negative for adenopathy  Psychiatric/Behavioral: Negative  Objective:      /76 (BP Location: Left arm, Patient Position: Sitting, Cuff Size: Standard)   Ht 5' 7" (1 702 m)   Wt 69 4 kg (153 lb)   LMP 07/24/2021 (Exact Date)   BMI 23 96 kg/m²          Physical Exam  Constitutional:       Appearance: She is well-developed  HENT:      Head: Normocephalic  Eyes:      Pupils: Pupils are equal, round, and reactive to light  Cardiovascular:      Rate and Rhythm: Normal rate and regular rhythm  Pulmonary:      Effort: Pulmonary effort is normal       Breath sounds: Normal breath sounds  Abdominal:      Palpations: Abdomen is soft  Musculoskeletal:         General: Normal range of motion        Cervical back: Normal range of motion and neck supple  Skin:     General: Skin is warm and dry  Neurological:      Mental Status: She is alert and oriented to person, place, and time  Psychiatric:         Mood and Affect: Mood normal          Behavior: Behavior normal          Thought Content:  Thought content normal          Judgment: Judgment normal

## 2021-08-03 ENCOUNTER — OFFICE VISIT (OUTPATIENT)
Dept: PSYCHIATRY | Facility: CLINIC | Age: 21
End: 2021-08-03
Payer: COMMERCIAL

## 2021-08-03 DIAGNOSIS — F33.1 MODERATE EPISODE OF RECURRENT MAJOR DEPRESSIVE DISORDER (HCC): Primary | ICD-10-CM

## 2021-08-03 PROCEDURE — 99214 OFFICE O/P EST MOD 30 MIN: CPT | Performed by: NURSE PRACTITIONER

## 2021-08-03 RX ORDER — FLUOXETINE 10 MG/1
10 TABLET, FILM COATED ORAL DAILY
Qty: 30 TABLET | Refills: 1 | Status: SHIPPED | OUTPATIENT
Start: 2021-08-03 | End: 2021-09-14

## 2021-08-03 NOTE — PSYCH
MEDICATION MANAGEMENT NOTE        [facility]      Name and Date of Birth:  Sandra Garcia 24 y o  2000 MRN: [id number]    Date of Visit: August 3, 2021    Reason for Visit:   Medication management for treatment of major depressive disorder  SUBJECTIVE:  Patient is a 80-year-old female has a history of major depressive disorder  Last week she called and she felt that she did need medication after all  She was having difficulty with anxiety, she was having difficulty processing her mood  She said she felt some days were very good and other days she just was not feeling very well at all  She did go way to the [facility] for a day and felt good that day  She now feels that therapy will be helpful but she believes that medication also would help  We discussed treatment options and for now were going to start her on Prozac 10 milligram daily  We will bring her back in 4-6 weeks to see how she is doing  She is to call in the meantime if needed  Also, I provided her with more names that she could possibly contact for therapy  She remains on our waiting list   No suicidal thoughts  She denies suicidal ideation, intent or plan; denies homicidal ideation, intent or plan  She denies auditory hallucinations, denies visual hallucinations, denies overt delusions  She denies any side effects from medications  HPI ROS Appetite Changes and Sleep:     She reports normal sleep, normal appetite, fluctuating energy levels    Current Rating Scores:     None completed today  Review Of Systems:      Constitutional negative   ENT negative   Cardiovascular negative   Respiratory negative   Gastrointestinal negative   Genitourinary negative   Musculoskeletal negative   Integumentary negative   Neurological negative   Endocrine Negative   Other Symptoms none, all other systems are negative           Past Medical History:    No past medical history on file    Past Medical History Pertinent Negatives:   Diagnosis Date Noted    Anxiety 08/13/2018    Depression 08/13/2018    Substance abuse ([address] ) 08/13/2018     Past Surgical History:   Procedure Laterality Date    ADENOIDECTOMY      TONSILLECTOMY AND ADENOIDECTOMY       Allergies   Allergen Reactions    Augmentin [Amoxicillin-Pot Clavulanate] Diarrhea    Fish-Derived Products - Food Allergy Vomiting       Substance Abuse History:    Social History     Substance and Sexual Activity   Alcohol Use Not Currently     Social History     Substance and Sexual Activity   Drug Use No       Social History:    Social History     Socioeconomic History    Marital status: Single     Spouse name: Not on file    Number of children: Not on file    Years of education: Not on file    Highest education level: Not on file   Occupational History    Occupation: student   Tobacco Use    Smoking status: Never Smoker    Smokeless tobacco: Never Used   Vaping Use    Vaping Use: Never used   Substance and Sexual Activity    Alcohol use: Not Currently    Drug use: No    Sexual activity: Yes   Other Topics Concern    Not on file   Social History Narrative    Not on file     Social Determinants of Health     Financial Resource Strain: Low Risk     Difficulty of Paying Living Expenses: Not hard at all   Food Insecurity: No Food Insecurity    Worried About Running Out of Food in the Last Year: Never true    Edi of Food in the Last Year: Never true   Transportation Needs: No Transportation Needs    Lack of Transportation (Medical): No    Lack of Transportation (Non-Medical):  No   Physical Activity:     Days of Exercise per Week:     Minutes of Exercise per Session:    Stress:     Feeling of Stress :    Social Connections:     Frequency of Communication with Friends and Family:     Frequency of Social Gatherings with Friends and Family:     Attends Sabianist Services:     Active Member of Clubs or Organizations:     Attends Club or Organization Meetings:     Marital Status:    Intimate Partner Violence:     Fear of Current or Ex-Partner:     Emotionally Abused:     Physically Abused:     Sexually Abused:        Family Psychiatric History:     Family History   Problem Relation Age of Onset    Diabetes Father     Hypertension Father     No Known Problems Mother     Hypertension Maternal Grandmother     Breast cancer Paternal Grandmother        History Review: Chart review         OBJECTIVE:     Vital signs in last 24 hours: There were no vitals filed for this visit  Mental Status Evaluation:    Appearance age appropriate, casually dressed   Behavior cooperative, calm   Speech normal rate, normal volume, normal pitch   Mood depressed   Affect constricted   Thought Processes linear   Associations intact associations   Thought Content no overt delusions   Perceptual Disturbances: no auditory hallucinations, no visual hallucinations   Abnormal Thoughts  Risk Potential Suicidal ideation - None  Homicidal ideation - None  Potential for aggression - No   Orientation oriented to person, place, time/date and situation   Memory recent and remote memory grossly intact   Consciousness alert and awake   Attention Span Concentration Span attention span and concentration are age appropriate   Intellect appears to be of average intelligence   Insight fair   Judgement fair   Muscle Strength and  Gait normal muscle strength and normal muscle tone, normal gait and normal balance   Motor activity no abnormal movements   Language no difficulty naming common objects, no difficulty repeating a phrase, no difficulty writing a sentence   Fund of Knowledge adequate knowledge of current events  adequate fund of knowledge regarding past history  adequate fund of knowledge regarding vocabulary    Pain none   Pain Scale 0       Laboratory Results: I have personally reviewed all pertinent laboratory/tests results    Recent Labs (last 2 months):    No visits with results within 2 Month(s) from this visit  Latest known visit with results is:   Appointment on 09/08/2020   Component Date Value    Cortisol, Random 09/08/2020 31 4     ACTH 09/08/2020 14 2        Suicide/Homicide Risk Assessment:    Risk of Harm to Self:  Based on today's assessment, Vane Ramos presents the following risk of harm to self: none    Risk of Harm to Others:  Based on today's assessment, Vane Ramos presents the following risk of harm to others: none    The following interventions are recommended: no intervention changes needed    Assessment/Plan:       Diagnoses and all orders for this visit:    Moderate episode of recurrent major depressive disorder (HCC)  -     FLUoxetine (PROzac) 10 MG tablet; Take 1 tablet (10 mg total) by mouth daily          Treatment Recommendations/Precautions:      Trial Prozac 10 milligram daily  Aware of 24 hour and weekend coverage for urgent situations accessed by calling [facility] main practice number    follow-up with me in 4-6 weeks    Medications Risks/Benefits      Risks, Benefits And Possible Side Effects Of Medications:    Risks, benefits, and possible side effects of medications explained to Vane Ramos and she verbalizes understanding and agreement for treatment  Controlled Medication Discussion:     Vane Ramos has been filling controlled prescriptions on time as prescribed according to [employer] Monitoring Program    Psychotherapy Provided:     Individual psychotherapy provided: Yes  Counseling was provided during the session today for 15 minutes  Medication education provided to Vane Ramos  Treatment Plan:    Completed and signed during the session: Yes - Treatment Plan done but not signed at time of office visit due to:  Plan reviewed in person and verbal consent given due to [place] social distancing    Note Share: This note was shared with patient      OMER Smallwood 08/03/21

## 2021-08-12 DIAGNOSIS — R51.9 ACUTE NONINTRACTABLE HEADACHE, UNSPECIFIED HEADACHE TYPE: ICD-10-CM

## 2021-08-12 RX ORDER — RIZATRIPTAN BENZOATE 10 MG/1
10 TABLET, ORALLY DISINTEGRATING ORAL ONCE AS NEEDED
Qty: 9 TABLET | Refills: 0 | Status: CANCELLED | OUTPATIENT
Start: 2021-08-12

## 2021-08-19 ENCOUNTER — ANNUAL EXAM (OUTPATIENT)
Dept: OBGYN CLINIC | Facility: CLINIC | Age: 21
End: 2021-08-19
Payer: COMMERCIAL

## 2021-08-19 ENCOUNTER — ULTRASOUND (OUTPATIENT)
Dept: OBGYN CLINIC | Facility: CLINIC | Age: 21
End: 2021-08-19
Payer: COMMERCIAL

## 2021-08-19 VITALS
HEIGHT: 67 IN | BODY MASS INDEX: 24.48 KG/M2 | WEIGHT: 156 LBS | DIASTOLIC BLOOD PRESSURE: 78 MMHG | SYSTOLIC BLOOD PRESSURE: 144 MMHG

## 2021-08-19 DIAGNOSIS — Z01.419 ENCNTR FOR GYN EXAM (GENERAL) (ROUTINE) W/O ABN FINDINGS: Primary | ICD-10-CM

## 2021-08-19 DIAGNOSIS — N93.0 POSTCOITAL AND CONTACT BLEEDING: ICD-10-CM

## 2021-08-19 DIAGNOSIS — N92.0 MENORRHAGIA WITH REGULAR CYCLE: ICD-10-CM

## 2021-08-19 DIAGNOSIS — Z01.419 PAP SMEAR, AS PART OF ROUTINE GYNECOLOGICAL EXAMINATION: ICD-10-CM

## 2021-08-19 DIAGNOSIS — Z30.41 ORAL CONTRACEPTIVE PILL SURVEILLANCE: ICD-10-CM

## 2021-08-19 DIAGNOSIS — N93.9 ABNORMAL UTERINE BLEEDING (AUB): ICD-10-CM

## 2021-08-19 PROCEDURE — 76856 US EXAM PELVIC COMPLETE: CPT | Performed by: OBSTETRICS & GYNECOLOGY

## 2021-08-19 PROCEDURE — 99395 PREV VISIT EST AGE 18-39: CPT | Performed by: OBSTETRICS & GYNECOLOGY

## 2021-08-19 PROCEDURE — G0145 SCR C/V CYTO,THINLAYER,RESCR: HCPCS | Performed by: OBSTETRICS & GYNECOLOGY

## 2021-08-19 NOTE — PATIENT INSTRUCTIONS
Normal gynecological physical examination  Self-breast examination stressed  Discussed regular exercise, healthy diet, importance of vitamin D and calcium supplements  Discussed importance of sun block use during periods of prolonged sun exposure  Patient will be seen in 1 year for routine gynecologic and medical examination  Patient will call office for any problems, concerns, or issues which may arise during the interim     follow-up pelvic ultrasound in 4-6 weeks for right ovarian cyst

## 2021-08-19 NOTE — PROGRESS NOTES
AMB US Pelvic Non OB    Date/Time: 8/19/2021 1:23 PM  Performed by: Kiana Brumfield  Authorized by: Frederick Mccracken MD     Procedure details:     Indications: intermenstrual blood loss      Indications comment:  AUB    Technique:  US Pelvic, Non-OB with complete exam  Uterine findings:     Length (cm): 6 4    Height (cm):  4 8    Width (cm):  3 9    Uterine adhesions: not identified      Adnexal mass: identified      Location:  Mass right    Polyps: not identified      Myomas: not identified      Endometrial stripe: identified      Endometrial hyperplasia: not identified      Endometrium thickness (mm):  5  Left ovary findings:     Left ovary:  Visualized    Cysts: not identified      Length (cm): 2 9    Height (cm): 2 4    Width (cm): 2 1  Right ovary findings:     Right ovary:  Visualized    Cysts: identified      Length (cm): 4 2    Height (cm): 4    Width (cm): 2 8    Flow:  Absent  Other findings:     Free pelvic fluid: not identified      Free peritoneal fluid: not identified    Post-Procedure Details:     Impression:  Endo-5mm  RT OV Cyst=29 x 28 x 26 mm, with no doppler flow within    Tolerance:   Tolerated well, no immediate complications

## 2021-08-19 NOTE — PROGRESS NOTES
Assessment/Plan:    No problem-specific Assessment & Plan notes found for this encounter  Diagnoses and all orders for this visit:    Encntr for gyn exam (general) (routine) w/o abn findings  -     Liquid-based pap, screening    Pap smear, as part of routine gynecological examination    Postcoital and contact bleeding    Oral contraceptive pill surveillance          Normal gynecological physical examination  Self-breast examination stressed  Discussed regular exercise, healthy diet, importance of vitamin D and calcium supplements  Discussed importance of sun block use during periods of prolonged sun exposure  Patient will be seen in 1 year for routine gynecologic and medical examination  Patient will call office for any problems, concerns, or issues which may arise during the interim  Subjective:      Patient ID: Denys Busby is a 24 y o  female  Patient is a 49-year-old female who presents today for her annual gynecologic and medical examination      patient reports that her menstrual cycles are normal   They occur on time and last for normal duration of several days  They are not accompanied by any heavy bleeding or clotting  She also denies any significant pain or cramping as well  Patient denies any bleeding or spotting between cycles  patient is on a low-dose oral contraceptive and is doing well      Patient has not had intercourse recently and has not experience any abnormal spotting or bleeding  Patient had pelvic ultrasound today which showed a right ovarian cyst   No other significant findings were seen  We will follow up with a scan in 4-6 weeks to check for resolution        Patient reports normal appetite     She also reports normal bowel and bladder habits     She denies any significant pelvic or abdominal pain     She also denies any headaches, chest pain, shortness of breath fever shakes or chills     She also denies COVID 19 symptoms including cough or loss of taste or smell    Patient has received the COVID vaccine  Patient is not being followed for any significant medical problems at this time    Patient does regular self-breast examinations           The following portions of the patient's history were reviewed and updated as appropriate: allergies, current medications, past family history, past medical history, past social history, past surgical history and problem list     Review of Systems   Constitutional: Negative  Negative for appetite change, diaphoresis, fatigue, fever and unexpected weight change  HENT: Negative  Eyes: Negative  Respiratory: Negative  Cardiovascular: Negative  Gastrointestinal: Negative  Negative for abdominal pain, blood in stool, constipation, diarrhea, nausea and vomiting  Endocrine: Negative  Negative for cold intolerance and heat intolerance  Genitourinary: Negative  Negative for dysuria, frequency, hematuria, urgency, vaginal bleeding, vaginal discharge and vaginal pain  Musculoskeletal: Negative  Skin: Negative  Allergic/Immunologic: Negative  Neurological: Negative  Followed for headaches   Hematological: Negative  Negative for adenopathy  Psychiatric/Behavioral: Negative  Objective:      /78 (BP Location: Left arm, Patient Position: Sitting, Cuff Size: Standard)   Ht 5' 7" (1 702 m)   Wt 70 8 kg (156 lb)   LMP 08/10/2021 (Exact Date)   BMI 24 43 kg/m²          Physical Exam  Constitutional:       General: She is not in acute distress  Appearance: Normal appearance  She is well-developed  She is not diaphoretic  HENT:      Head: Normocephalic and atraumatic  Eyes:      Pupils: Pupils are equal, round, and reactive to light  Cardiovascular:      Rate and Rhythm: Normal rate and regular rhythm  Heart sounds: Normal heart sounds  No murmur heard  No friction rub  No gallop      Pulmonary:      Effort: Pulmonary effort is normal       Breath sounds: Normal breath sounds  Chest:      Breasts: Breasts are symmetrical          Right: No inverted nipple, mass, nipple discharge, skin change or tenderness  Left: No inverted nipple, mass, nipple discharge, skin change or tenderness  Abdominal:      General: Bowel sounds are normal       Palpations: Abdomen is soft  Genitourinary:     General: Normal vulva  Exam position: Supine  Labia:         Right: No rash or lesion  Left: No rash or lesion  Urethra: No urethral swelling or urethral lesion  Vagina: Normal  No vaginal discharge, erythema, tenderness or bleeding  Cervix: No discharge or friability  Uterus: Not enlarged and not tender  Adnexa:         Right: No mass, tenderness or fullness  Left: No mass, tenderness or fullness  Comments:  Small cervical of version noted which bled slightly when Pap smear completed  Findings explained to the patient as well  Musculoskeletal:         General: Normal range of motion  Cervical back: Normal range of motion and neck supple  Lymphadenopathy:      Cervical: No cervical adenopathy  Upper Body:      Right upper body: No supraclavicular adenopathy  Left upper body: No supraclavicular adenopathy  Skin:     General: Skin is warm and dry  Findings: No rash  Neurological:      General: No focal deficit present  Mental Status: She is alert and oriented to person, place, and time  Psychiatric:         Mood and Affect: Mood normal          Speech: Speech normal          Behavior: Behavior normal          Thought Content:  Thought content normal          Judgment: Judgment normal

## 2021-08-25 LAB
LAB AP GYN PRIMARY INTERPRETATION: NORMAL
Lab: NORMAL
PATH INTERP SPEC-IMP: NORMAL

## 2021-08-31 DIAGNOSIS — B96.89 BV (BACTERIAL VAGINOSIS): Primary | ICD-10-CM

## 2021-08-31 DIAGNOSIS — N76.0 BV (BACTERIAL VAGINOSIS): Primary | ICD-10-CM

## 2021-08-31 RX ORDER — METRONIDAZOLE 500 MG/1
500 TABLET ORAL EVERY 12 HOURS SCHEDULED
Qty: 14 TABLET | Refills: 0 | Status: SHIPPED | OUTPATIENT
Start: 2021-08-31 | End: 2021-09-07

## 2021-08-31 NOTE — TELEPHONE ENCOUNTER
----- Message from Enedelia Smith MD sent at 8/28/2021  9:38 AM EDT -----  Pap is NEGATIIVE    SHOWS  BV     May treat with FLAGYL  500 mg  #14  1 Tab twice daily for 7 days    Thanks

## 2021-09-03 ENCOUNTER — OFFICE VISIT (OUTPATIENT)
Dept: URGENT CARE | Age: 21
End: 2021-09-03
Payer: COMMERCIAL

## 2021-09-03 VITALS
OXYGEN SATURATION: 99 % | WEIGHT: 153 LBS | HEART RATE: 77 BPM | TEMPERATURE: 98 F | DIASTOLIC BLOOD PRESSURE: 89 MMHG | RESPIRATION RATE: 20 BRPM | SYSTOLIC BLOOD PRESSURE: 137 MMHG | BODY MASS INDEX: 24.01 KG/M2 | HEIGHT: 67 IN

## 2021-09-03 DIAGNOSIS — S05.12XA CONTUSION OF LEFT EYE, INITIAL ENCOUNTER: Primary | ICD-10-CM

## 2021-09-03 DIAGNOSIS — S05.8X2A ABRASION OF SCLERA OF LEFT EYE, INITIAL ENCOUNTER: ICD-10-CM

## 2021-09-03 PROCEDURE — G0382 LEV 3 HOSP TYPE B ED VISIT: HCPCS | Performed by: PHYSICIAN ASSISTANT

## 2021-09-03 RX ORDER — TOBRAMYCIN 3 MG/ML
2 SOLUTION/ DROPS OPHTHALMIC
Qty: 5 ML | Refills: 0 | Status: SHIPPED | OUTPATIENT
Start: 2021-09-03 | End: 2021-09-10

## 2021-09-04 NOTE — PATIENT INSTRUCTIONS
Use the drops as directed for 7-10 days     go to emergency room if you have any worsening symptoms        Follow-up with the eye doctor if symptoms persist
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
## 2021-09-14 ENCOUNTER — TELEMEDICINE (OUTPATIENT)
Dept: PSYCHIATRY | Facility: CLINIC | Age: 21
End: 2021-09-14
Payer: COMMERCIAL

## 2021-09-14 DIAGNOSIS — F33.1 MODERATE EPISODE OF RECURRENT MAJOR DEPRESSIVE DISORDER (HCC): Primary | ICD-10-CM

## 2021-09-14 PROCEDURE — 99214 OFFICE O/P EST MOD 30 MIN: CPT | Performed by: NURSE PRACTITIONER

## 2021-09-14 RX ORDER — FLUOXETINE 10 MG/1
20 TABLET, FILM COATED ORAL DAILY
Qty: 30 TABLET | Refills: 1
Start: 2021-09-14 | End: 2022-01-27 | Stop reason: ALTCHOICE

## 2021-09-14 RX ORDER — FLUOXETINE 20 MG/1
20 TABLET, FILM COATED ORAL DAILY
Qty: 30 TABLET | Refills: 1 | Status: SHIPPED | OUTPATIENT
Start: 2021-09-14 | End: 2021-12-15 | Stop reason: SDUPTHER

## 2021-09-14 NOTE — PSYCH
Virtual Regular Visit    Verification of patient location:    Patient is located in the following state in which I hold an active license [place]      Assessment/Plan:    Problem List Items Addressed This Visit     None      Visit Diagnoses     Moderate episode of recurrent major depressive disorder ([facility] [address] )    -  Primary    Relevant Medications    FLUoxetine (PROzac) 10 MG tablet    FLUoxetine (PROzac) 20 MG tablet          Goals addressed in session:   Eliminate depression         Reason for visit is   Chief Complaint   Patient presents with    Virtual Regular Visit        Encounter provider OMER Barnett    Provider located at [address] [place] [address]      Recent Visits  No visits were found meeting these conditions  Showing recent visits within past 7 days and meeting all other requirements  Future Appointments  No visits were found meeting these conditions  Showing future appointments within next 150 days and meeting all other requirements       The patient was identified by name and date of birth  Janell Alejo was informed that this is a telemedicine visit and [facility] the [facility] and patient was informed that this is a secure, HIPAA-compliant platform  She agrees to proceed     My office door was closed  No one else was in the room  She acknowledged consent and understanding of privacy and security of the video platform  The patient has agreed to participate and understands they can discontinue the visit at any time  Patient is aware this is a billable service  Patient presents today for treatment of major depressive disorder and anxiety disorder  On examination today the patient is doing fairly well in school and enjoying it but she is still having some breakthrough symptoms of depression  She said the Prozac is helping but not quite enough    She is also having some anxiety  As far as school work, she is doing well  She has no complaints in that respect  She is more social and she is enjoying outside activities from school  She has been on the Prozac 10 mg for approximately 3 months so we will increase it up to 20 mg a day  She is in agreement  Follow-up with me will be in 6 weeks  Mental status exam:  Patient is awake and alert and oriented x3  Mood is mildly depressed  Patient is not suicidal, not homicidal and not psychotic  Speech is clear and thoughts are well organized, coherent goal-directed  Attention span is good  Impulse control is good  Judgment and insight are good  Memory is good  Past Medical History:   Diagnosis Date    Depression     History of migraine     Low vitamin D level     Uses birth control        Past Surgical History:   Procedure Laterality Date    ADENOIDECTOMY      TONSILLECTOMY AND ADENOIDECTOMY         Current Outpatient Medications   Medication Sig Dispense Refill    FLUoxetine (PROzac) 20 MG tablet Take 1 tablet (20 mg total) by mouth daily 30 tablet 1    cholecalciferol (VITAMIN D3) 1,000 units tablet Take 1,000 Units by mouth daily      ferrous sulfate 325 (65 Fe) mg tablet Take 325 mg by mouth daily at bedtime      Fiber Complete TABS Take 2 tablets by mouth daily      FLUoxetine (PROzac) 10 MG tablet Take 2 tablets (20 mg total) by mouth daily 30 tablet 1    Magnesium 400 MG CAPS Take 400 mg by mouth daily      norgestimate-ethinyl estradiol (Tri-Lo-Terrie) 0 18/0 215/0 25 MG-25 MCG per tablet Take 1 tablet by mouth daily 28 tablet 11    rizatriptan (MAXALT-MLT) 10 MG disintegrating tablet Take 1 tablet (10 mg total) by mouth once as needed for migraine for up to 1 dose May repeat in 2 hours if needed 9 tablet 5    topiramate (TOPAMAX) 25 mg tablet 3 tab po qhs 270 tablet 3     No current facility-administered medications for this visit          Allergies   Allergen Reactions    Augmentin [Amoxicillin-Pot Clavulanate] Diarrhea    Fish-Derived Products - Food Allergy Vomiting       Review of Systems    Video Exam    There were no vitals filed for this visit  Physical Exam     I spent 25 minutes with patient today in which greater than 50% of the time was spent in counseling/coordination of care regarding Medication management, chart review and treatment  VIRTUAL VISIT [address] verbally agrees to participate in [employer]  Pt is aware that [employer] could be limited without vital signs or the ability to perform a full hands-on physical exam  Sunita Carranza understands she or the provider may request at any time to terminate the video visit and request the patient to seek care or treatment in person

## 2021-09-27 ENCOUNTER — ULTRASOUND (OUTPATIENT)
Dept: OBGYN CLINIC | Facility: CLINIC | Age: 21
End: 2021-09-27
Payer: COMMERCIAL

## 2021-09-27 DIAGNOSIS — N92.0 MENORRHAGIA WITH REGULAR CYCLE: ICD-10-CM

## 2021-09-27 DIAGNOSIS — N83.209 CYST OF OVARY, UNSPECIFIED LATERALITY: ICD-10-CM

## 2021-09-27 PROCEDURE — 76856 US EXAM PELVIC COMPLETE: CPT | Performed by: OBSTETRICS & GYNECOLOGY

## 2021-09-27 NOTE — PROGRESS NOTES
AMB US Pelvic Non OB    Date/Time: 9/27/2021 10:15 AM  Performed by: Wendi Sheehan  Authorized by: Marcela Monroy MD     Procedure details:     Indications: ovarian cysts      Technique:  US Pelvic, Non-OB with complete exam  Uterine findings:     Length (cm): 6 8    Height (cm):  4 8    Width (cm):  3 8    Uterine adhesions: not identified      Adnexal mass: not identified      Polyps: not identified      Myomas: not identified      Endometrial stripe: identified      Endometrial hyperplasia: not identified      Endometrium thickness (mm):  6  Left ovary findings:     Left ovary:  Visualized    Cysts: not identified      Length (cm): 2 7    Height (cm): 2 5    Width (cm): 2  Right ovary findings:     Cysts: not identified      Length (cm): 2 5    Height (cm): 2    Width (cm): 2 2  Other findings:     Free pelvic fluid: not identified      Free peritoneal fluid: not identified    Post-Procedure Details:     Impression:  Endo-6mm, No cyst seen today, WNL    Tolerance:   Tolerated well, no immediate complications

## 2021-10-06 ENCOUNTER — OFFICE VISIT (OUTPATIENT)
Dept: URGENT CARE | Facility: CLINIC | Age: 21
End: 2021-10-06
Payer: COMMERCIAL

## 2021-10-06 VITALS
HEIGHT: 67 IN | WEIGHT: 150 LBS | DIASTOLIC BLOOD PRESSURE: 79 MMHG | OXYGEN SATURATION: 96 % | TEMPERATURE: 99.4 F | RESPIRATION RATE: 18 BRPM | SYSTOLIC BLOOD PRESSURE: 118 MMHG | BODY MASS INDEX: 23.54 KG/M2 | HEART RATE: 75 BPM

## 2021-10-06 DIAGNOSIS — M62.838 NECK MUSCLE SPASM: Primary | ICD-10-CM

## 2021-10-06 PROCEDURE — G0382 LEV 3 HOSP TYPE B ED VISIT: HCPCS | Performed by: PHYSICIAN ASSISTANT

## 2021-10-06 RX ORDER — METHOCARBAMOL 750 MG/1
750 TABLET, FILM COATED ORAL EVERY 6 HOURS PRN
Qty: 20 TABLET | Refills: 0 | Status: SHIPPED | OUTPATIENT
Start: 2021-10-06 | End: 2022-05-17

## 2021-10-15 ENCOUNTER — TELEPHONE (OUTPATIENT)
Dept: NEUROLOGY | Facility: CLINIC | Age: 21
End: 2021-10-15

## 2021-10-15 ENCOUNTER — OFFICE VISIT (OUTPATIENT)
Dept: NEUROLOGY | Facility: CLINIC | Age: 21
End: 2021-10-15
Payer: COMMERCIAL

## 2021-10-15 VITALS
WEIGHT: 151 LBS | HEART RATE: 79 BPM | SYSTOLIC BLOOD PRESSURE: 104 MMHG | HEIGHT: 67 IN | TEMPERATURE: 97.7 F | DIASTOLIC BLOOD PRESSURE: 72 MMHG | BODY MASS INDEX: 23.7 KG/M2

## 2021-10-15 DIAGNOSIS — R51.9 FREQUENT HEADACHES: ICD-10-CM

## 2021-10-15 DIAGNOSIS — E23.7 PITUITARY ABNORMALITY (HCC): Primary | ICD-10-CM

## 2021-10-15 PROCEDURE — 99214 OFFICE O/P EST MOD 30 MIN: CPT | Performed by: PSYCHIATRY & NEUROLOGY

## 2021-10-15 RX ORDER — TOPIRAMATE 25 MG/1
TABLET ORAL
Qty: 180 TABLET | Refills: 3 | Status: SHIPPED | OUTPATIENT
Start: 2021-10-15

## 2021-12-15 DIAGNOSIS — F33.1 MODERATE EPISODE OF RECURRENT MAJOR DEPRESSIVE DISORDER (HCC): ICD-10-CM

## 2021-12-15 RX ORDER — FLUOXETINE 20 MG/1
20 TABLET, FILM COATED ORAL DAILY
Qty: 30 TABLET | Refills: 1 | Status: SHIPPED | OUTPATIENT
Start: 2021-12-15

## 2021-12-21 ENCOUNTER — OFFICE VISIT (OUTPATIENT)
Dept: URGENT CARE | Facility: CLINIC | Age: 21
End: 2021-12-21
Payer: COMMERCIAL

## 2021-12-21 VITALS
BODY MASS INDEX: 23.54 KG/M2 | TEMPERATURE: 99.2 F | WEIGHT: 150 LBS | HEART RATE: 85 BPM | HEIGHT: 67 IN | OXYGEN SATURATION: 96 %

## 2021-12-21 DIAGNOSIS — L23.7 ALLERGIC CONTACT DERMATITIS DUE TO PLANTS, EXCEPT FOOD: Primary | ICD-10-CM

## 2021-12-21 PROCEDURE — G0382 LEV 3 HOSP TYPE B ED VISIT: HCPCS | Performed by: FAMILY MEDICINE

## 2021-12-21 RX ORDER — DIAPER,BRIEF,INFANT-TODD,DISP
EACH MISCELLANEOUS 3 TIMES DAILY
Qty: 30 G | Refills: 0 | Status: SHIPPED | OUTPATIENT
Start: 2021-12-21 | End: 2022-05-17

## 2021-12-21 RX ORDER — PREDNISONE 20 MG/1
40 TABLET ORAL DAILY
Qty: 10 TABLET | Refills: 0 | Status: SHIPPED | OUTPATIENT
Start: 2021-12-21 | End: 2021-12-26

## 2022-01-27 ENCOUNTER — OFFICE VISIT (OUTPATIENT)
Dept: ENDOCRINOLOGY | Facility: CLINIC | Age: 22
End: 2022-01-27
Payer: COMMERCIAL

## 2022-01-27 VITALS
HEIGHT: 67 IN | HEART RATE: 100 BPM | WEIGHT: 158 LBS | BODY MASS INDEX: 24.8 KG/M2 | DIASTOLIC BLOOD PRESSURE: 92 MMHG | SYSTOLIC BLOOD PRESSURE: 120 MMHG

## 2022-01-27 DIAGNOSIS — E23.7 PITUITARY ABNORMALITY (HCC): Primary | ICD-10-CM

## 2022-01-27 PROCEDURE — 99213 OFFICE O/P EST LOW 20 MIN: CPT | Performed by: INTERNAL MEDICINE

## 2022-01-27 NOTE — PROGRESS NOTES
Chief Complaint   Patient presents with    enlarged pituitary      Referring Provider  Donell Figueroa Md  [address] [facility],  [address] [place] [address]     History of Present Illness:   Lynda Kelly is a 24 y o  female with an enlarged pituitary seen in follow-up  She was last in the office in 8/2020    She had a history of headaches and has an MRI done as part of this evaluation in June 2020  A change in the pituitary was noted, and she had a dedicated pituitary MRI later in June 2020  No adenoma was identified, but there was enlargement with some stalk deviation  There is no other history of endocrine disease, and her biochemical qwork up and VF were normal     Ms Barney Coon has used OCPs for about 4years  She did have one concussion in high school  She has gained some weight while at home the past few months  Otherwise she feels well  Her headaches are doing well and she denies breast tenderness, nipple leakage, symptoms suggestive of hypothyroidism, or symptoms suggesting low cortisol levels  Patient Active Problem List   Diagnosis    Menorrhagia with regular cycle    Iron deficiency anemia    BMI 31 0-31 9,adult    Frequent headaches    Pituitary abnormality (HCC)    Allergic contact dermatitis due to plants, except food      Past Medical History:   Diagnosis Date    Depression     History of migraine     Low vitamin D level     Uses birth control       Past Surgical History:   Procedure Laterality Date    ADENOIDECTOMY      TONSILLECTOMY AND ADENOIDECTOMY        Family History   Problem Relation Age of Onset    Diabetes Father     Hypertension Father     No Known Problems Mother     Hypertension Maternal Grandmother     Breast cancer Paternal Grandmother      Social History     Tobacco Use    Smoking status: Never Smoker    Smokeless tobacco: Never Used   Substance Use Topics    Alcohol use:  Yes     Alcohol/week: 10 0 standard drinks     Types: 10 Standard drinks or equivalent per week     Allergies   Allergen Reactions    Augmentin [Amoxicillin-Pot Clavulanate] Diarrhea    Fish-Derived Products - Food Allergy Vomiting         Current Outpatient Medications:     cholecalciferol (VITAMIN D3) 1,000 units tablet, Take 1,000 Units by mouth daily, Disp: , Rfl:     ferrous sulfate 325 (65 Fe) mg tablet, Take 325 mg by mouth daily at bedtime, Disp: , Rfl:     Fiber Complete TABS, Take 2 tablets by mouth daily, Disp: , Rfl:     FLUoxetine (PROzac) 20 MG tablet, Take 1 tablet (20 mg total) by mouth daily, Disp: 30 tablet, Rfl: 1    hydrocortisone 1 % cream, Apply topically 3 (three) times a day, Disp: 30 g, Rfl: 0    Magnesium 400 MG CAPS, Take 400 mg by mouth daily, Disp: , Rfl:     methocarbamol (Robaxin-750) 750 mg tablet, Take 1 tablet (750 mg total) by mouth every 6 (six) hours as needed for muscle spasms, Disp: 20 tablet, Rfl: 0    norgestimate-ethinyl estradiol (Tri-Lo-Terrie) 0 18/0 215/0 25 MG-25 MCG per tablet, Take 1 tablet by mouth daily, Disp: 28 tablet, Rfl: 11    rizatriptan (MAXALT-MLT) 10 MG disintegrating tablet, Take 1 tablet (10 mg total) by mouth once as needed for migraine for up to 1 dose May repeat in 2 hours if needed, Disp: 9 tablet, Rfl: 5    topiramate (TOPAMAX) 25 mg tablet, 2 tab po qhs, Disp: 180 tablet, Rfl: 3  Review of Systems   Constitutional: Negative for fatigue and unexpected weight change  HENT: Negative for trouble swallowing and voice change  Eyes: Negative for visual disturbance  Respiratory: Negative for cough and shortness of breath  Cardiovascular: Negative for chest pain and palpitations  Gastrointestinal: Negative for diarrhea and nausea  Endocrine: Positive for cold intolerance  Genitourinary: Negative for menstrual problem  Skin:        Denies changes in hair/skin/nails   Neurological: Positive for headaches  Negative for tremors  Psychiatric/Behavioral: The patient is not nervous/anxious      All other systems reviewed and are negative  see also HPI    Physical Exam:  Body mass index is 24 75 kg/m²  /92   Pulse 100   Ht 5' 7" (1 702 m)   Wt 71 7 kg (158 lb)   BMI 24 75 kg/m²    Wt Readings from Last 3 Encounters:   01/27/22 71 7 kg (158 lb)   12/21/21 68 kg (150 lb)   10/15/21 68 5 kg (151 lb)       GEN: NAD  E/n/m mask in place, hearing intact bilat  Eyes: no stare or proptosis, nl lids and conjunctiva, EOMI  Neck: trachea midline, thyroid NT to palpation, nl in size, no nodules or neck masses noted, no cervical LAD  CV; heart reg rate s1s2 nl, no m/r/g appreciated  Resp: CTAB, good effort  Ab+BS  Neuro: no tremor  Skin: warm and dry, no palmar erythema  Psych: nl mood and affect, no gross lapses in memory    DATA:  Labs:     Lab Results   Component Value Date    HUW1XDJEHWGM 2 150 07/27/2020       Lab Results   Component Value Date    FREET4 1 09 07/27/2020       Lab Results   Component Value Date    SODIUM 141 03/16/2020    K 3 8 03/16/2020     (H) 03/16/2020    CO2 27 03/16/2020    BUN 10 06/08/2020    CREATININE 0 86 06/08/2020    GLUC 99 03/16/2020    CALCIUM 9 0 03/16/2020       Lab Results   Component Value Date    NIV2IUATACCZ 2 150 07/27/2020         PRL: 9 5  IGF1 112  FSH 4 7  LH 25 2      Radiology    6/2020  SELLA AND PITUITARY GLAND:  The pituitary gland is mildly enlarged in craniocaudad height measuring 13 mm with AP dimension of 9 mm in transverse dimension of 12 mm  Pituitary stalk is slightly deviated to the left  The gland homogeneously enhances  Differential considerations include pituitary macroadenoma versus pituitary hyperplasia  Optic chiasm is unremarkable  Cavernous sinus regions unremarkable  Impression:  1  Pituitary abnormality [facility])           Plan:    Josh Manuel was seen today for enlarged pituitary      Diagnoses and all orders for this visit:    Pituitary abnormality ([facility] [address] )  -     MRI brain pituitary wo and w contrast; Future  -     TSH, 3rd generation Lab Collect; Future  -     T4, free Lab Collect; Future  -     Cortisol- Lab Collect; Future  -     ACTH- Lab Collect; Future  -     Prolactin Lab Collect; Future  -     Basic metabolic panel Lab Collect; Future      1  Pituitary abnormality [facility])         Pituitary enlargement: An adenoma is not seen  There can be physiologic enlargement of the pituitary at various points of life including adolescence  She will get repeat labs to verify anterior pit function and a repeat MRI to evaluate pituitary  If all are normal, then she will not need additional endocrine follow-up  Discussed with the patient and all questioned fully answered  She will call me if any problems arise          Carlos Dawkins MD

## 2022-01-31 DIAGNOSIS — N92.0 MENORRHAGIA WITH REGULAR CYCLE: ICD-10-CM

## 2022-02-03 RX ORDER — NORGESTIMATE AND ETHINYL ESTRADIOL
KIT
Qty: 84 TABLET | Refills: 1 | Status: SHIPPED | OUTPATIENT
Start: 2022-02-03 | End: 2022-05-12 | Stop reason: SDUPTHER

## 2022-02-25 ENCOUNTER — TELEPHONE (OUTPATIENT)
Dept: ENDOCRINOLOGY | Facility: CLINIC | Age: 22
End: 2022-02-25

## 2022-02-25 NOTE — TELEPHONE ENCOUNTER
Current Status: Approved   Validity Period: 2/25/2022 - 3/27/2022   Authorization: [id number] (Brain MRI)   Patient   Name: Vickie Garsia ID: [id number]   YOB: 2000   Gender: Female   Physician   Name: Tamela Stevenson   Provider ID:

## 2022-02-28 ENCOUNTER — HOSPITAL ENCOUNTER (EMERGENCY)
Facility: HOSPITAL | Age: 22
Discharge: HOME | End: 2022-03-01
Attending: EMERGENCY MEDICINE | Admitting: EMERGENCY MEDICINE
Payer: COMMERCIAL

## 2022-02-28 VITALS
HEART RATE: 82 BPM | OXYGEN SATURATION: 100 % | SYSTOLIC BLOOD PRESSURE: 198 MMHG | WEIGHT: 169 LBS | DIASTOLIC BLOOD PRESSURE: 97 MMHG | BODY MASS INDEX: 26.47 KG/M2 | RESPIRATION RATE: 18 BRPM

## 2022-02-28 DIAGNOSIS — K59.00 CONSTIPATION, UNSPECIFIED CONSTIPATION TYPE: Primary | ICD-10-CM

## 2022-02-28 DIAGNOSIS — R11.0 NAUSEA: ICD-10-CM

## 2022-02-28 PROCEDURE — 99284 EMERGENCY DEPT VISIT MOD MDM: CPT | Performed by: EMERGENCY MEDICINE

## 2022-02-28 PROCEDURE — 81025 URINE PREGNANCY TEST: CPT | Performed by: EMERGENCY MEDICINE

## 2022-02-28 PROCEDURE — 99283 EMERGENCY DEPT VISIT LOW MDM: CPT

## 2022-02-28 RX ORDER — ACETAMINOPHEN 325 MG/1
975 TABLET ORAL ONCE
Status: COMPLETED | OUTPATIENT
Start: 2022-02-28 | End: 2022-03-01

## 2022-02-28 RX ORDER — METOCLOPRAMIDE HYDROCHLORIDE 5 MG/ML
10 INJECTION INTRAMUSCULAR; INTRAVENOUS ONCE
Status: COMPLETED | OUTPATIENT
Start: 2022-02-28 | End: 2022-03-01

## 2022-02-28 RX ORDER — KETOROLAC TROMETHAMINE 30 MG/ML
15 INJECTION, SOLUTION INTRAMUSCULAR; INTRAVENOUS ONCE
Status: COMPLETED | OUTPATIENT
Start: 2022-02-28 | End: 2022-03-01

## 2022-02-28 NOTE — Clinical Note
Aranza Honeycutt was seen and treated in our emergency department on 2/28/2022  Diagnosis:     Yasmin Denis  may return to school on return date  She may return on this date: 03/02/2022         If you have any questions or concerns, please don't hesitate to call        Hollis Waddell MD    ______________________________           _______________          _______________  Hospital Representative                              Date                                Time

## 2022-03-01 LAB
EXT PREG TEST URINE: NEGATIVE
EXT. CONTROL ED NAV: NORMAL

## 2022-03-01 PROCEDURE — 96361 HYDRATE IV INFUSION ADD-ON: CPT

## 2022-03-01 PROCEDURE — 96375 TX/PRO/DX INJ NEW DRUG ADDON: CPT

## 2022-03-01 PROCEDURE — 96374 THER/PROPH/DIAG INJ IV PUSH: CPT

## 2022-03-01 RX ORDER — ONDANSETRON 4 MG/1
4 TABLET, ORALLY DISINTEGRATING ORAL EVERY 6 HOURS PRN
Qty: 20 TABLET | Refills: 0 | Status: SHIPPED | OUTPATIENT
Start: 2022-03-01 | End: 2022-05-17

## 2022-03-01 RX ORDER — POLYETHYLENE GLYCOL 3350 17 G/17G
17 POWDER, FOR SOLUTION ORAL DAILY
Qty: 507 G | Refills: 0 | Status: SHIPPED | OUTPATIENT
Start: 2022-03-01 | End: 2022-05-17

## 2022-03-01 RX ADMIN — KETOROLAC TROMETHAMINE 15 MG: 30 INJECTION, SOLUTION INTRAMUSCULAR at 00:29

## 2022-03-01 RX ADMIN — ACETAMINOPHEN 975 MG: 325 TABLET ORAL at 00:28

## 2022-03-01 RX ADMIN — SODIUM CHLORIDE 1000 ML: 0.9 INJECTION, SOLUTION INTRAVENOUS at 00:07

## 2022-03-01 RX ADMIN — METOCLOPRAMIDE 10 MG: 5 INJECTION, SOLUTION INTRAMUSCULAR; INTRAVENOUS at 00:30

## 2022-03-01 NOTE — DISCHARGE INSTRUCTIONS
Use Tylenol and ibuprofen to treat your headache  Use Miralax to treat your constipation  Use Zofran to treat your nausea

## 2022-03-01 NOTE — ED PROVIDER NOTES
History  Chief Complaint   Patient presents with    Headache     nausea, vomiting, and headache     Rowena Favre is a 24year-old woman presenting with a headache, nausea, and constipation  She typically gets migraines, however this is different from her typical migraine in the fact that it is accompanied by constipation  Her headache worsens when she tries to have a bowel movement  She also feels nauseous when she tries to have a bowel movement  Her last normal bowel movement was yesterday evening, no blood or melena  She has not treated her symptoms with anything at home  She has pain behind her left eye  She had an MRI for her migraines which showed an enlarged pituitary stalk  Her LMP was at the end of January and was normal  Loose stool yesterday  This is not the worst headache of her life and it was not abrupt in onset  No headstrikes, she does not take blood thinners  No dysuria, hematuria, urinary urgency or frequency, vaginal bleeding or discharge  Prior to Admission Medications   Prescriptions Last Dose Informant Patient Reported? Taking?    FLUoxetine (PROzac) 20 MG tablet  Self No No   Sig: Take 1 tablet (20 mg total) by mouth daily   Fiber Complete TABS  Self Yes No   Sig: Take 2 tablets by mouth daily   Magnesium 400 MG CAPS  Self Yes No   Sig: Take 400 mg by mouth daily   Tri-Lo-Sprintec 0 18/0 215/0 25 MG-25 MCG per tablet   No No   Sig: TAKE 1 TABLET BY MOUTH DAILY   cholecalciferol (VITAMIN D3) 1,000 units tablet  Self Yes No   Sig: Take 1,000 Units by mouth daily   ferrous sulfate 325 (65 Fe) mg tablet  Self Yes No   Sig: Take 325 mg by mouth daily at bedtime   hydrocortisone 1 % cream  Self No No   Sig: Apply topically 3 (three) times a day   methocarbamol (Robaxin-750) 750 mg tablet  Self No No   Sig: Take 1 tablet (750 mg total) by mouth every 6 (six) hours as needed for muscle spasms   rizatriptan (MAXALT-MLT) 10 MG disintegrating tablet  Self No No   Sig: Take 1 tablet (10 mg total) by mouth once as needed for migraine for up to 1 dose May repeat in 2 hours if needed   topiramate (TOPAMAX) 25 mg tablet  Self No No   Si tab po qhs      Facility-Administered Medications: None       Past Medical History:   Diagnosis Date    Depression     History of migraine     Low vitamin D level     Uses birth control        Past Surgical History:   Procedure Laterality Date    ADENOIDECTOMY      TONSILLECTOMY AND ADENOIDECTOMY         Family History   Problem Relation Age of Onset    Diabetes Father     Hypertension Father     No Known Problems Mother     Hypertension Maternal Grandmother     Breast cancer Paternal Grandmother      I have reviewed and agree with the history as documented  E-Cigarette/Vaping    E-Cigarette Use Never User      E-Cigarette/Vaping Substances    Nicotine No     THC No     CBD No     Flavoring No     Other No     Unknown No      Social History     Tobacco Use    Smoking status: Never Smoker    Smokeless tobacco: Never Used   Vaping Use    Vaping Use: Never used   Substance Use Topics    Alcohol use: Yes     Alcohol/week: 10 0 standard drinks     Types: 10 Standard drinks or equivalent per week    Drug use: Never        Review of Systems   All other systems reviewed and are negative  Physical Exam  ED Triage Vitals [22]   Temp Pulse Respirations Blood Pressure SpO2   -- 82 18 (!) 198/97 100 %      Temp src Heart Rate Source Patient Position - Orthostatic VS BP Location FiO2 (%)   -- Monitor Sitting Right arm --      Pain Score       5             Orthostatic Vital Signs  Vitals:    22   BP: (!) 198/97   Pulse: 82   Patient Position - Orthostatic VS: Sitting       Physical Exam  Vitals and nursing note reviewed  Constitutional:       General: She is not in acute distress  Appearance: She is not ill-appearing  HENT:      Head: Normocephalic and atraumatic        Right Ear: External ear normal       Left Ear: External ear normal       Nose: Nose normal       Mouth/Throat:      Mouth: Mucous membranes are moist    Eyes:      General: No scleral icterus  Extraocular Movements: Extraocular movements intact  Conjunctiva/sclera: Conjunctivae normal       Pupils: Pupils are equal, round, and reactive to light  Funduscopic exam:     Right eye: No papilledema  Left eye: No papilledema  Cardiovascular:      Rate and Rhythm: Normal rate and regular rhythm  Pulses: Normal pulses  Pulmonary:      Effort: Pulmonary effort is normal       Breath sounds: Normal breath sounds  Abdominal:      General: There is no distension  Palpations: Abdomen is soft  Tenderness: There is no right CVA tenderness or left CVA tenderness  Comments: Very mild suprapubic tenderness  Musculoskeletal:         General: No deformity  Cervical back: No rigidity  Skin:     General: Skin is warm and dry  Capillary Refill: Capillary refill takes less than 2 seconds  Coloration: Skin is not jaundiced or pale  Neurological:      General: No focal deficit present  Mental Status: She is alert and oriented to person, place, and time  Comments: CN2-12 intact  5/5 strength and normal sensation in bilateral extremities   Finger-to-nose normal  Gait normal           ED Medications  Medications   metoclopramide (REGLAN) injection 10 mg (10 mg Intravenous Given 3/1/22 0030)   sodium chloride 0 9 % bolus 1,000 mL (0 mL Intravenous Stopped 3/1/22 0126)   acetaminophen (TYLENOL) tablet 975 mg (975 mg Oral Given 3/1/22 0028)   ketorolac (TORADOL) injection 15 mg (15 mg Intravenous Given 3/1/22 0029)       Diagnostic Studies  Results Reviewed     Procedure Component Value Units Date/Time    POCT pregnancy, urine [[id number]]  (Normal) Resulted: 03/01/22 0118    Lab Status: Final result Updated: 03/01/22 0118     EXT PREG TEST UR (Ref: Negative) NEGATIVE     Control VALID                 No orders to display         Procedures  Procedures      ED Course                             SBIRT 22yo+      Most Recent Value   SBIRT (24 yo +)    In order to provide better care to our patients, we are screening all of our patients for alcohol and drug use  Would it be okay to ask you these screening questions? No Filed at: 02/28/2022 2330                [facility]  Number of Diagnoses or Management Options  Constipation, unspecified constipation type  Nausea  Diagnosis management comments: 23 yo woman presenting with constipation and headache  No red flag headache symptoms  Will treat headache with 1 L IVF, Reglan, Toradol, and Tylenol  Will rule out pregnancy with urine pregnancy test  Abdominal pain most likely due to constipation  Discussed treatment options with patient, prefers prescription for Miralax at home  Urine pregnancy negative  Symptoms improved in the ED  Discharged home in stable condition  Disposition  Final diagnoses:   Constipation, unspecified constipation type   Nausea     Time reflects when diagnosis was documented in both MDM as applicable and the Disposition within this note     Time User Action Codes Description Comment    3/1/2022  1:18 AM Lamberto Barragan [K59 00] Constipation, unspecified constipation type     3/1/2022  1:19 AM Lamberto Barragan [R11 0] Nausea       ED Disposition     ED Disposition Condition Date/Time Comment    Discharge Stable Tue Mar 1, 2022  1:19 AM Edgard Velez discharge to home/self care  Follow-up Information     Follow up With Specialties Details Why Roseanne Rodriguez MD Family Medicine   [address] [place], [place] [id number] [phone]            Discharge Medication List as of 3/1/2022  1:19 AM      START taking these medications    Details   ondansetron (Zofran ODT) 4 mg disintegrating tablet Take 1 tablet (4 mg total) by mouth every 6 (six) hours as needed for nausea or vomiting for up to 20 doses, Starting Tue 3/1/2022, Normal polyethylene glycol (GLYCOLAX) 17 GM/SCOOP powder Take 17 g by mouth daily, Starting Tue 3/1/2022, Normal         CONTINUE these medications which have NOT CHANGED    Details   cholecalciferol (VITAMIN D3) 1,000 units tablet Take 1,000 Units by mouth daily, Historical Med      ferrous sulfate 325 (65 Fe) mg tablet Take 325 mg by mouth daily at bedtime, Historical Med      Fiber Complete TABS Take 2 tablets by mouth daily, Historical Med      FLUoxetine (PROzac) 20 MG tablet Take 1 tablet (20 mg total) by mouth daily, Starting Wed 12/15/2021, Normal      hydrocortisone 1 % cream Apply topically 3 (three) times a day, Starting Tue 12/21/2021, Normal      Magnesium 400 MG CAPS Take 400 mg by mouth daily, Historical Med      methocarbamol (Robaxin-750) 750 mg tablet Take 1 tablet (750 mg total) by mouth every 6 (six) hours as needed for muscle spasms, Starting Wed 10/6/2021, Normal      rizatriptan (MAXALT-MLT) 10 MG disintegrating tablet Take 1 tablet (10 mg total) by mouth once as needed for migraine for up to 1 dose May repeat in 2 hours if needed, Starting Fri 4/16/2021, Normal      topiramate (TOPAMAX) 25 mg tablet 2 tab po qhs, Normal      Tri-Lo-Sprintec 0 18/0 215/0 25 MG-25 MCG per tablet TAKE 1 TABLET BY MOUTH DAILY, Normal           No discharge procedures on file  PDMP Review     None           ED Provider  Attending physically available and evaluated Mary Price I managed the patient along with the ED Attending      Electronically Signed by         Alec Maloney MD  03/01/22 7068

## 2022-03-01 NOTE — ED ATTENDING ATTESTATION
Final Diagnosis:  1  Constipation, unspecified constipation type    2  Nausea           I, Leigh Alexander MD, saw and evaluated the patient  All available labs and X-rays were ordered by me or the resident and have been reviewed by myself  I discussed the patient with the resident / non-physician and agree with the resident's / non-physician practitioner's findings and plan as documented in the resident's / non-physician practicitioner's note, except where noted  At this point, I agree with the current assessment done in the ED  I was present during key portions of all procedures performed unless otherwise stated  Chief Complaint   Patient presents with    Headache     nausea, vomiting, and headache     This is a 24 y o  female presenting for evaluation of nausea vomiting HA constipation  It started last night, felt constipation, vomited x3 (NBNB, small volume)  She tried to stool and would get the headache  Normal BM yesterday, not normally constipated  Stool was NBNM  Hx of migraines but this feels different because she has constipation with it    Denies any urinary tract infection symptoms (burning, itching, pain, blood, frequency)  No f/ch/cp/sob  Sexuall active; last menses was end of Jan (normal)  No vaginal discharge or bleeding  Had an MRI for migraines and had an enlarged pituitary stalk  PMH:   has a past medical history of Depression, History of migraine, Low vitamin D level, and Uses birth control  PSH:   has a past surgical history that includes Tonsillectomy and adenoidectomy and ADENOIDECTOMY      Social:  Social History     Substance and Sexual Activity   Alcohol Use Yes    Alcohol/week: 10 0 standard drinks    Types: 10 Standard drinks or equivalent per week     Social History     Tobacco Use   Smoking Status Never Smoker   Smokeless Tobacco Never Used     Social History     Substance and Sexual Activity   Drug Use Never     PE:  Vitals:    02/28/22 2258   BP: (!) 198/97 BP Location: Right arm   Pulse: 82   Resp: 18   SpO2: 100%   Weight: 76 7 kg (169 lb)   General: VSS, NAD, awake, alert  Well-nourished, well-developed  Appears stated age  Head: Normocephalic, atraumatic, nontender  Eyes: PERRL, EOM-I  No diplopia  No hyphema  No subconjunctival hemorrhages  Symmetrical lids  ENTAtraumatic external nose and ears  MMM  No stridor  Normal phonation  No drooling  Base of mouth is soft  No mastoid tenderness  Neck: Symmetric, trachea midline  No JVD  CV: Peripheral pulses +2 throughout  No chest wall tenderness  Lungs:   Unlabored   No retractions  No crepitus  No tachypnea  No paradoxical motion  Abd: +BS, soft, NT/ND    MSK:   FROM   No lower extremity edema  Back:   No CVAT  Skin: Dry, intact  Neuro: AAOx3, GCS 15, CN II-XII grossly intact  Motor grossly intact  Sensory grossly intact  Psychiatric/Behavioral: Appropriate mood and affect   Exam: deferred  A:  - Migraine headache  - Constipation  P:  - Migraine cocktail  - urine dip/pregnancy  - Miralax  - 13 point ROS was performed and all are normal unless stated in the history above  - Nursing note reviewed  Vitals reviewed  - Orders placed by myself and/or advanced practitioner / resident     - Previous chart was reviewed  - No language barrier    - History obtained from patient  - There are no limitations to the history obtained  - Critical care time: Not applicable for this patient       Code Status: No Order  Advance Directive and Living Will:      Power of :    POLST:      Medications   metoclopramide (REGLAN) injection 10 mg (10 mg Intravenous Given 3/1/22 0030)   sodium chloride 0 9 % bolus 1,000 mL (0 mL Intravenous Stopped 3/1/22 0126)   acetaminophen (TYLENOL) tablet 975 mg (975 mg Oral Given 3/1/22 0028)   ketorolac (TORADOL) injection 15 mg (15 mg Intravenous Given 3/1/22 0029)     No orders to display     Orders Placed This Encounter   Procedures    POCT pregnancy, urine     Labs Reviewed   POCT PREGNANCY, URINE - Normal       Result Value Ref Range Status    EXT PREG TEST UR (Ref: Negative) NEGATIVE   Final    Control VALID   Final     Time reflects when diagnosis was documented in both MDM as applicable and the Disposition within this note       Time User Action Codes Description Comment    3/1/2022  1:18 AM Lethia Current Add [K59 00] Constipation, unspecified constipation type     3/1/2022  1:19 AM Lethia Current Add [R11 0] Nausea           ED Disposition       ED Disposition Condition Date/Time Comment    Discharge Stable Tue Mar 1, 2022  1:19 AM Harika Son discharge to home/self care  Follow-up Information       Follow up With Specialties Details Why Soledad Gutierrez MD Family Medicine   [address]   [place] [id number]  [phone]            Discharge Medication List as of 3/1/2022  1:19 AM        START taking these medications    Details   ondansetron (Zofran ODT) 4 mg disintegrating tablet Take 1 tablet (4 mg total) by mouth every 6 (six) hours as needed for nausea or vomiting for up to 20 doses, Starting Tue 3/1/2022, Normal      polyethylene glycol (GLYCOLAX) 17 GM/SCOOP powder Take 17 g by mouth daily, Starting Tue 3/1/2022, Normal           CONTINUE these medications which have NOT CHANGED    Details   cholecalciferol (VITAMIN D3) 1,000 units tablet Take 1,000 Units by mouth daily, Historical Med      ferrous sulfate 325 (65 Fe) mg tablet Take 325 mg by mouth daily at bedtime, Historical Med      Fiber Complete TABS Take 2 tablets by mouth daily, Historical Med      FLUoxetine (PROzac) 20 MG tablet Take 1 tablet (20 mg total) by mouth daily, Starting Wed 12/15/2021, Normal      hydrocortisone 1 % cream Apply topically 3 (three) times a day, Starting Tue 12/21/2021, Normal      Magnesium 400 MG CAPS Take 400 mg by mouth daily, Historical Med      methocarbamol (Robaxin-750) 750 mg tablet Take 1 tablet (750 mg total) by mouth every 6 (six) hours as needed for muscle spasms, Starting Wed 10/6/2021, Normal      rizatriptan (MAXALT-MLT) 10 MG disintegrating tablet Take 1 tablet (10 mg total) by mouth once as needed for migraine for up to 1 dose May repeat in 2 hours if needed, Starting Fri 2021, Normal      topiramate (TOPAMAX) 25 mg tablet 2 tab po qhs, Normal      Tri-Lo-Sprintec 0 18/0 215/0 25 MG-25 MCG per tablet TAKE 1 TABLET BY MOUTH DAILY, Normal           No discharge procedures on file  Prior to Admission Medications   Prescriptions Last Dose Informant Patient Reported? Taking? FLUoxetine (PROzac) 20 MG tablet  Self No No   Sig: Take 1 tablet (20 mg total) by mouth daily   Fiber Complete TABS  Self Yes No   Sig: Take 2 tablets by mouth daily   Magnesium 400 MG CAPS  Self Yes No   Sig: Take 400 mg by mouth daily   Tri-Lo-Sprintec 0 18/0 215/0 25 MG-25 MCG per tablet   No No   Sig: TAKE 1 TABLET BY MOUTH DAILY   cholecalciferol (VITAMIN D3) 1,000 units tablet  Self Yes No   Sig: Take 1,000 Units by mouth daily   ferrous sulfate 325 (65 Fe) mg tablet  Self Yes No   Sig: Take 325 mg by mouth daily at bedtime   hydrocortisone 1 % cream  Self No No   Sig: Apply topically 3 (three) times a day   methocarbamol (Robaxin-750) 750 mg tablet  Self No No   Sig: Take 1 tablet (750 mg total) by mouth every 6 (six) hours as needed for muscle spasms   rizatriptan (MAXALT-MLT) 10 MG disintegrating tablet  Self No No   Sig: Take 1 tablet (10 mg total) by mouth once as needed for migraine for up to 1 dose May repeat in 2 hours if needed   topiramate (TOPAMAX) 25 mg tablet  Self No No   Si tab po qhs      Facility-Administered Medications: None       Portions of the record may have been created with voice recognition software  Occasional wrong word or "sound a like" substitutions may have occurred due to the inherent limitations of voice recognition software   Read the chart carefully and recognize, using context, where substitutions have occurred      Electronically signed by:  Temi Belcher

## 2022-03-04 ENCOUNTER — HOSPITAL ENCOUNTER (OUTPATIENT)
Dept: RADIOLOGY | Facility: HOSPITAL | Age: 22
Discharge: HOME | End: 2022-03-04
Attending: INTERNAL MEDICINE
Payer: COMMERCIAL

## 2022-03-04 DIAGNOSIS — E23.7 PITUITARY ABNORMALITY (HCC): ICD-10-CM

## 2022-03-04 PROCEDURE — 70553 MRI BRAIN STEM W/O & W/DYE: CPT

## 2022-03-04 PROCEDURE — A9585 GADOBUTROL INJECTION: HCPCS | Performed by: INTERNAL MEDICINE

## 2022-03-04 PROCEDURE — G1004 CDSM NDSC: HCPCS

## 2022-03-04 RX ADMIN — GADOBUTROL 8 ML: 604.72 INJECTION INTRAVENOUS at 17:36

## 2022-03-08 ENCOUNTER — OFFICE VISIT (OUTPATIENT)
Dept: FAMILY MEDICINE CLINIC | Facility: CLINIC | Age: 22
End: 2022-03-08
Payer: COMMERCIAL

## 2022-03-08 VITALS
BODY MASS INDEX: 25.87 KG/M2 | SYSTOLIC BLOOD PRESSURE: 122 MMHG | HEIGHT: 67 IN | WEIGHT: 164.8 LBS | RESPIRATION RATE: 16 BRPM | TEMPERATURE: 97.2 F | DIASTOLIC BLOOD PRESSURE: 80 MMHG | HEART RATE: 80 BPM | OXYGEN SATURATION: 99 %

## 2022-03-08 DIAGNOSIS — M25.561 ACUTE PAIN OF BOTH KNEES: ICD-10-CM

## 2022-03-08 DIAGNOSIS — Z23 ENCOUNTER FOR VACCINATION: ICD-10-CM

## 2022-03-08 DIAGNOSIS — M25.562 ACUTE PAIN OF BOTH KNEES: ICD-10-CM

## 2022-03-08 DIAGNOSIS — M21.069 VALGUS DEFORMITY, NOT ELSEWHERE CLASSIFIED, UNSPECIFIED KNEE: ICD-10-CM

## 2022-03-08 DIAGNOSIS — Z00.00 ANNUAL PHYSICAL EXAM: Primary | ICD-10-CM

## 2022-03-08 DIAGNOSIS — R11.15 CYCLICAL VOMITING: ICD-10-CM

## 2022-03-08 PROCEDURE — 99395 PREV VISIT EST AGE 18-39: CPT | Performed by: FAMILY MEDICINE

## 2022-03-08 PROCEDURE — 99213 OFFICE O/P EST LOW 20 MIN: CPT | Performed by: FAMILY MEDICINE

## 2022-03-08 PROCEDURE — 90471 IMMUNIZATION ADMIN: CPT

## 2022-03-08 PROCEDURE — 90686 IIV4 VACC NO PRSV 0.5 ML IM: CPT

## 2022-03-08 NOTE — PROGRESS NOTES
Assessment/Plan:    Annual physical exam  Normal PE  Reminded to get blood work done  Pap UTD  Paternal grandmother with breast cancer  Pt will get standard cancer screenings  Diagnoses and all orders for this visit:    Annual physical exam    Encounter for vaccination  -     influenza vaccine, quadrivalent, 0 5 mL, preservative-free, for adult and pediatric patients 6 mos+ (AFLURIA, FLUARIX, FLULAVAL, FLUZONE)    Valgus deformity, not elsewhere classified, unspecified knee  Comments:  Likely locking is due to valgus def  Referred to PT  Acute pain of both knees  -     Ambulatory Referral to Physical Therapy; Future    Cyclical vomiting  -     Ambulatory Referral to Gastroenterology; Future          Subjective: knee issues      Patient ID: Mindy Denney is a 24 y o  female  HPI  Concerned about locking of her knees when going up the steps for the last 2 months  Denies prior injuries  Pt reports pain of the thighs that is worsening  The pain is described as soreness  The following portions of the patient's history were reviewed and updated as appropriate: allergies, current medications, past family history, past medical history, past social history, past surgical history and problem list     Review of Systems   Constitutional: Negative for fever and unexpected weight change  HENT: Negative for ear pain, sore throat and trouble swallowing  Eyes: Negative for pain and visual disturbance  Respiratory: Negative for cough, chest tightness, shortness of breath and wheezing  Cardiovascular: Negative for chest pain  Gastrointestinal: Negative for abdominal distention, abdominal pain, blood in stool, constipation, diarrhea, nausea and vomiting  Endocrine: Negative for polydipsia and polyuria  Genitourinary: Negative for dysuria and hematuria  Musculoskeletal: Negative for back pain and myalgias  Skin: Negative for rash  Neurological: Negative for syncope and headaches  Psychiatric/Behavioral: Negative for suicidal ideas  PHQ-2/9 Depression Screening    Little interest or pleasure in doing things: 2 - more than half the days  Feeling down, depressed, or hopeless: 2 - more than half the days  Trouble falling or staying asleep, or sleeping too much: 2 - more than half the days  Feeling tired or having little energy: 2 - more than half the days  Poor appetite or overeatin - more than half the days  Feeling bad about yourself - or that you are a failure or have let yourself or your family down: 1 - several days  Trouble concentrating on things, such as reading the newspaper or watching television: 2 - more than half the days  Moving or speaking so slowly that other people could have noticed  Or the opposite - being so fidgety or restless that you have been moving around a lot more than usual: 0 - not at all  Thoughts that you would be better off dead, or of hurting yourself in some way: 0 - not at all           Objective:      /80 (BP Location: Left arm, Patient Position: Sitting, Cuff Size: Adult)   Pulse 80   Temp (!) 97 2 °F (36 2 °C) (Tympanic)   Resp 16   Ht 5' 7" (1 702 m)   Wt 74 8 kg (164 lb 12 8 oz)   SpO2 99%   BMI 25 81 kg/m²          Physical Exam  Constitutional:       Appearance: She is well-developed  HENT:      Head: Normocephalic and atraumatic  Right Ear: External ear normal       Left Ear: External ear normal       Mouth/Throat:      Pharynx: No oropharyngeal exudate  Eyes:      General: No scleral icterus  Conjunctiva/sclera: Conjunctivae normal       Pupils: Pupils are equal, round, and reactive to light  Cardiovascular:      Rate and Rhythm: Normal rate and regular rhythm  Heart sounds: No murmur heard  No friction rub  No gallop  Pulmonary:      Effort: Pulmonary effort is normal  No respiratory distress  Breath sounds: Normal breath sounds  No wheezing or rales     Abdominal:      General: Bowel sounds are normal  There is no distension  Palpations: Abdomen is soft  There is no mass  Tenderness: There is no abdominal tenderness  There is no rebound  Musculoskeletal:         General: Normal range of motion  Cervical back: Normal range of motion and neck supple  Comments: Negative anterior, posterior drawer  Negative Kwame's  Positive laxity with valgus stress   Skin:     General: Skin is warm and dry  Neurological:      Mental Status: She is alert and oriented to person, place, and time

## 2022-03-08 NOTE — PATIENT INSTRUCTIONS

## 2022-03-08 NOTE — ASSESSMENT & PLAN NOTE
Normal PE  Reminded to get blood work done  Pap UTD  Paternal grandmother with breast cancer  Pt will get standard cancer screenings

## 2022-03-08 NOTE — PROGRESS NOTES
[address] [facility]    NAME: Bianka Spears  AGE: 24 y o  SEX: female  : 2000     DATE: 3/8/2022     Assessment and Plan:     Problem List Items Addressed This Visit        Other    Annual physical exam - Primary     Normal PE  Reminded to get blood work done  Pap UTD  Paternal grandmother with breast cancer  Pt will get standard cancer screenings  Other Visit Diagnoses     Encounter for vaccination        Relevant Orders    influenza vaccine, quadrivalent, 0 5 mL, preservative-free, for adult and pediatric patients 6 mos+ (AFLURIA, [address], FLULAVAL, FLUZONE)          Immunizations and preventive care screenings were discussed with patient today  Appropriate education was printed on patient's after visit summary  Counseling:  Alcohol/drug use: discussed moderation in alcohol intake, the recommendations for healthy alcohol use, and avoidance of illicit drug use  Dental Health: discussed importance of regular tooth brushing, flossing, and dental visits  · Exercise: the importance of regular exercise/physical activity was discussed  Recommend exercise 3-5 times per week for at least 30 minutes  No follow-ups on file  Chief Complaint:     Chief Complaint   Patient presents with    Physical Exam      History of Present Illness:     Adult Annual Physical   Patient here for a comprehensive physical exam  The patient reports no problems  Diet and Physical Activity  · Diet/Nutrition: well balanced diet  · Exercise: moderate cardiovascular exercise        Depression Screening  PHQ-2/9 Depression Screening    Little interest or pleasure in doing things: 2 - more than half the days  Feeling down, depressed, or hopeless: 2 - more than half the days  Trouble falling or staying asleep, or sleeping too much: 2 - more than half the days  Feeling tired or having little energy: 2 - more than half the days  Poor appetite or overeatin - more than half the days  Feeling bad about yourself - or that you are a failure or have let yourself or your family down: 1 - several days  Trouble concentrating on things, such as reading the newspaper or watching television: 2 - more than half the days  Moving or speaking so slowly that other people could have noticed  Or the opposite - being so fidgety or restless that you have been moving around a lot more than usual: 0 - not at all  Thoughts that you would be better off dead, or of hurting yourself in some way: 0 - not at all       [address] [place] [address]  · Sleep: sleeps well  · Hearing: no issues  · Vision: goes for regular eye exams and wears glasses  · Dental: regular dental visits  /GYN Health  · Last menstrual period: 3/1/22  · Contraceptive method: oral contraceptives  · History of STDs?: no   · Pt has gynecologist and paps are UTD  No new partners since last STD check  · Recommend a woman's daily multivitamin  Review of Systems:     Review of Systems   Constitutional: Negative for fever and unexpected weight change  HENT: Negative for ear pain, sore throat and trouble swallowing  Eyes: Negative for pain and visual disturbance  Respiratory: Negative for cough, chest tightness, shortness of breath and wheezing  Cardiovascular: Negative for chest pain  Gastrointestinal: Negative for abdominal distention, abdominal pain, blood in stool, constipation, diarrhea, nausea and vomiting  Endocrine: Negative for polydipsia and polyuria  Genitourinary: Negative for dysuria and hematuria  Musculoskeletal: Positive for arthralgias  Negative for back pain and myalgias  Skin: Negative for rash  Neurological: Negative for syncope and headaches  Psychiatric/Behavioral: Negative for suicidal ideas        Past Medical History:     Past Medical History:   Diagnosis Date    Depression     History of migraine     Low vitamin D level     Uses birth control Past Surgical History:     Past Surgical History:   Procedure Laterality Date    ADENOIDECTOMY      TONSILLECTOMY AND ADENOIDECTOMY        Social History:     Social History     Socioeconomic History    Marital status: Single     Spouse name: None    Number of children: None    Years of education: None    Highest education level: None   Occupational History    Occupation: student   Tobacco Use    Smoking status: Never Smoker    Smokeless tobacco: Never Used   Vaping Use    Vaping Use: Never used   Substance and Sexual Activity    Alcohol use: Yes     Alcohol/week: 10 0 standard drinks     Types: 10 Standard drinks or equivalent per week    Drug use: Never    Sexual activity: Yes     Partners: Male     Birth control/protection: Condom Male, OCP   Other Topics Concern    None   Social History Narrative    None     Social Determinants of Health     Financial Resource Strain: Low Risk     Difficulty of Paying Living Expenses: Not hard at all   Food Insecurity: No Food Insecurity    Worried About Running Out of Food in the Last Year: Never true    Edi of Food in the Last Year: Never true   Transportation Needs: No Transportation Needs    Lack of Transportation (Medical): No    Lack of Transportation (Non-Medical):  No   Physical Activity: Not on file   Stress: Not on file   Social Connections: Not on file   Intimate Partner Violence: Not on file   Housing Stability: Not on file      Family History:     Family History   Problem Relation Age of Onset    Diabetes Father     Hypertension Father     No Known Problems Mother     Hypertension Maternal Grandmother     Breast cancer Paternal Grandmother       Current Medications:     Current Outpatient Medications   Medication Sig Dispense Refill    cholecalciferol (VITAMIN D3) 1,000 units tablet Take 1,000 Units by mouth daily      ferrous sulfate 325 (65 Fe) mg tablet Take 325 mg by mouth daily at bedtime      Fiber Complete TABS Take 2 tablets by mouth daily      FLUoxetine (PROzac) 20 MG tablet Take 1 tablet (20 mg total) by mouth daily 30 tablet 1    Magnesium 400 MG CAPS Take 400 mg by mouth daily      methocarbamol (Robaxin-750) 750 mg tablet Take 1 tablet (750 mg total) by mouth every 6 (six) hours as needed for muscle spasms 20 tablet 0    ondansetron (Zofran ODT) 4 mg disintegrating tablet Take 1 tablet (4 mg total) by mouth every 6 (six) hours as needed for nausea or vomiting for up to 20 doses 20 tablet 0    polyethylene glycol (GLYCOLAX) 17 GM/SCOOP powder Take 17 g by mouth daily 507 g 0    rizatriptan (MAXALT-MLT) 10 MG disintegrating tablet Take 1 tablet (10 mg total) by mouth once as needed for migraine for up to 1 dose May repeat in 2 hours if needed 9 tablet 5    topiramate (TOPAMAX) 25 mg tablet 2 tab po qhs 180 tablet 3    Tri-Lo-Sprintec 0 18/0 215/0 25 MG-25 MCG per tablet TAKE 1 TABLET BY MOUTH DAILY 84 tablet 1    hydrocortisone 1 % cream Apply topically 3 (three) times a day 30 g 0     No current facility-administered medications for this visit  Allergies: Allergies   Allergen Reactions    Augmentin [Amoxicillin-Pot Clavulanate] Diarrhea    Fish-Derived Products - Food Allergy Vomiting      Physical Exam:     /80 (BP Location: Left arm, Patient Position: Sitting, Cuff Size: Adult)   Pulse 80   Temp (!) 97 2 °F (36 2 °C) (Tympanic)   Resp 16   Ht 5' 7" (1 702 m)   Wt 74 8 kg (164 lb 12 8 oz)   SpO2 99%   BMI 25 81 kg/m²     Physical Exam  Constitutional:       Appearance: She is well-developed  HENT:      Head: Normocephalic and atraumatic  Eyes:      General: No scleral icterus  Conjunctiva/sclera: Conjunctivae normal       Pupils: Pupils are equal, round, and reactive to light  Cardiovascular:      Rate and Rhythm: Normal rate and regular rhythm  Heart sounds: Normal heart sounds  No murmur heard  No friction rub  No gallop      Pulmonary:      Effort: Pulmonary effort is normal  No respiratory distress  Breath sounds: No wheezing or rales  Chest:      Chest wall: No tenderness  Abdominal:      General: Bowel sounds are normal  There is no distension  Palpations: Abdomen is soft  There is no mass  Tenderness: There is no abdominal tenderness  Musculoskeletal:         General: Normal range of motion  Cervical back: Normal range of motion and neck supple  Lymphadenopathy:      Cervical: No cervical adenopathy  Skin:     General: Skin is warm and dry  Capillary Refill: Capillary refill takes less than 2 seconds  Findings: No rash  Neurological:      Mental Status: She is alert and oriented to person, place, and time  Cranial Nerves: No cranial nerve deficit            Alma Ochoa MD   [address] [place] [address]

## 2022-03-24 ENCOUNTER — EVALUATION (OUTPATIENT)
Dept: PHYSICAL THERAPY | Facility: CLINIC | Age: 22
End: 2022-03-24
Payer: COMMERCIAL

## 2022-03-24 DIAGNOSIS — M25.561 ACUTE PAIN OF BOTH KNEES: Primary | ICD-10-CM

## 2022-03-24 DIAGNOSIS — M25.562 ACUTE PAIN OF BOTH KNEES: Primary | ICD-10-CM

## 2022-03-24 PROCEDURE — 97110 THERAPEUTIC EXERCISES: CPT | Performed by: PHYSICAL THERAPIST

## 2022-03-24 PROCEDURE — 97162 PT EVAL MOD COMPLEX 30 MIN: CPT | Performed by: PHYSICAL THERAPIST

## 2022-03-24 NOTE — PROGRESS NOTES
PT Evaluation     Today's date: 3/24/2022  Patient name: Tanner Schumacher  : 2000  MRN: [id number]  Referring provider: Jesús Pandey MD  Dx:   Encounter Diagnosis     ICD-10-CM    1  Acute pain of both knees  M25 561     M25 562                   Assessment  Assessment details: Tanner Schumacher is a 24 y o  female who presents with increased B/L knee pain consistent with referring diagnosis of Acute pain of both knees  (primary encounter diagnosis) that is complex secondary to insidious onset, moderate fear avoidance and pain levels  Clinically demonstrates decreased B/L knee/quad/hamstring length and ROM, decreased hip and quad/hamstring strength, limited LE proprioception leading to pain with ADLs and exercise  This suggests the need for skilled OPPT to address the above listed impairments, achieve established goals and return to PLOF pain-free  If you have any questions or concerns please contact me at [phone]  Thank you!   Impairments: abnormal coordination, abnormal gait, abnormal muscle firing, abnormal muscle tone, abnormal or restricted ROM, activity intolerance, impaired balance, impaired physical strength, lacks appropriate home exercise program, pain with function and poor posture     Symptom irritability: moderateUnderstanding of Dx/Px/POC: good   Prognosis: good    Goals  Short Term Goals (4 weeks)  1 ) Establish independence with HEP  2 ) Decrease subjective pain levels from NPRS at least to 2-5/10 at rest and with activity  3 ) Improve knee ROM at least 5-10 degrees into all planes to allow for improved ease of movement with less guarding    Long Term Goals (8 weeks)  1 ) Improve B/L knee ROM to WNL in all planes to restore normal movement with ADLs and function  2 ) Improve quad/hamstring and glute medius strength to 5/5 in all planes in order to return to pain-free ADLs and function  3 ) Improve FOTO score at least to 75 points showing improved self reported disability     Plan  Plan details: Initiate POC for B/L knee ROM, strength and stability; monitor sxs and progress as able   Patient would benefit from: skilled physical therapy and PT eval  Planned modality interventions: biofeedback, cryotherapy, electrical stimulation/Russian stimulation and TENS  Planned therapy interventions: abdominal trunk stabilization, activity modification, ADL retraining, ADL training, IADL retraining, joint mobilization, manual therapy, neuromuscular re-education, patient education, postural training, self care, sensory integrative techniques, strengthening, stretching, therapeutic activities, therapeutic exercise, therapeutic training, home exercise program, graded motor, graded exercise, graded activity, gait training, flexibility, functional ROM exercises, coordination, behavior modification and balance  Frequency: 2x week  Duration in visits: 16  Duration in weeks: 8  Plan of Care beginning date: 3/24/2022  Plan of Care expiration date: 2022  Treatment plan discussed with: patient        Subjective Evaluation    History of Present Illness  Date of onset: 3/3/2022  Mechanism of injury: Jose Raul Kimble is a 24 y o  female who presents with increased B/L knee pain starting ~ 3 weeks ago with DESIRE- notes that she started knees give out and buckle and noted that on the stairs having her knees have more pain and impact- will lead to falls down the stairs  Notes 5-6 falls down the stairs  Decided to seek out MD and was denied any X-rays were script for OPPT provided  Denies night pain or changes in bowel or bladder function  Denies any NT signs or sxs- just aching in her quads worse after walking up a hill or negotiating stairs  Denies any F/U with MD   Wishes to make her knees stronger and reduce fall occurrence             Not a recurrent problem   Quality of life: good    Pain  Current pain ratin  At best pain ratin  At worst pain ratin  Location: B/L knees  Quality: sharp and dull ache  Relieving factors: rest, relaxation, support and change in position  Aggravating factors: stair climbing and walking  Progression: worsening    Social Support    Employment status: working ( at [employer])  Exercise history: Yoga, core strength; elliptical at the gym and bike 2-3   Life stress: Student    Treatments  Current treatment: physical therapy  Patient Goals  Patient goals for therapy: decreased edema, decreased pain, improved balance, increased motion, increased strength, independence with ADLs/IADLs and return to sport/leisure activities  Patient goal: Stop falling, get stronger and limit reoccurance         Objective     Active Range of Motion   Left Knee   Flexion: 150 degrees   Prone flexion: 135 degrees   Extension: 0 degrees   Extensor la degrees     Right Knee   Flexion: 147 degrees   Prone flexion: 135 degrees   Extension: 0 degrees   Extensor la degrees     Strength/Myotome Testing     Left Hip   Planes of Motion   Flexion: 4  Adduction: 4+  External rotation: 4+  Internal rotation: 5    Right Hip   Planes of Motion   Flexion: 4  Adduction: 4+  External rotation: 4-  Internal rotation: 4+    Left Knee   Flexion: 5  Extension: 4  Quadriceps contraction: fair    Right Knee   Flexion: 5  Extension: 4+  Quadriceps contraction: fair    Swelling     Left Knee Girth Measurement (cm)   Joint line: 35 5 cm    Right Knee Girth Measurement (cm)   Joint line: 35 5 cm    Functional Assessment      Squat    Left within functional limits, right within functional limits, left varus and right varus  Single Leg Squat   Left Leg  Left trunk side bending, valgus and anterior tibial translation beyond toes  Right Leg  Left trunk side bending, valgus and tibial anterior translation beyond toes       Single Leg Stance   Left: 30 seconds  Right: 30 seconds  Neuro Exam:     Functional outcomes   5x sit to stand: 13 51 (seconds)             Precautions: Headaches  EPOC: 22  HEP: Manuals 3/24            B/L patellar mob             Knee PROM             HS and Gastroc stretch             Luc stretch             Neuro Re-Ed                                                                                                        Ther Ex             SLR x 3 10x ea            Clamshell             Bridges 10x            SL bridge 10x            Sit to stand 10x                                                   Ther Activity             Bike                           Gait Training                                       Modalities

## 2022-04-04 ENCOUNTER — APPOINTMENT (OUTPATIENT)
Dept: PHYSICAL THERAPY | Facility: CLINIC | Age: 22
End: 2022-04-04
Payer: COMMERCIAL

## 2022-04-11 ENCOUNTER — OFFICE VISIT (OUTPATIENT)
Dept: PHYSICAL THERAPY | Facility: CLINIC | Age: 22
End: 2022-04-11
Payer: COMMERCIAL

## 2022-04-11 DIAGNOSIS — M25.562 ACUTE PAIN OF BOTH KNEES: Primary | ICD-10-CM

## 2022-04-11 DIAGNOSIS — M25.561 ACUTE PAIN OF BOTH KNEES: Primary | ICD-10-CM

## 2022-04-11 PROCEDURE — 97530 THERAPEUTIC ACTIVITIES: CPT | Performed by: PHYSICAL THERAPIST

## 2022-04-11 PROCEDURE — 97110 THERAPEUTIC EXERCISES: CPT | Performed by: PHYSICAL THERAPIST

## 2022-04-11 NOTE — PROGRESS NOTES
Daily Note     Today's date: 2022  Patient name: Isael Bray  : 2000  MRN: [id number]  Referring provider: Enoch Perkins MD  Dx:   Encounter Diagnosis     ICD-10-CM    1  Acute pain of both knees  M25 561     M25 562        Start Time: 0800  Stop Time: 0847  Total time in clinic (min): 47 minutes    Subjective: Pt reports that her pain has been much better since the initial evaluation  She has been doing her exercises and reports pain in both knees as a 0/10 over the past week  The knees just feel a little unsteady during exercises  Objective: See treatment diary below      Assessment: PT progressed LE strengthening today with more closed chain and neuromuscular re-education exercises to improve quad, hamstring, and hip strength  Pt required verbal cueing to avoid knee valgus during side stepping but improved form following cues  Pt demonstrated fatigue during exercises due to weak quadriceps and hip abductors, but noted no pain in the knees throughout  PT also discussed postural stretches to improve shoulder pain after a long work day  Pt will continue with HEP  Plan: Continue per plan of care  Progress LE strengthening as tolerated and update HEP       Precautions: Headaches  EPOC: 22  HEP:      Manuals 3/24 4/11           B/L patellar mob             Knee PROM             HS and Gastroc stretch             Luc stretch             Neuro Re-Ed             SLS on foam pad  3x 30s each side                                                                                         Ther Ex             SLR x 3 10x ea            Clamshell  2x10           Bridges 10x 2x20           SL bridge 10x 2x10 each leg           Sit to stand 10x            Side lying hip ABD  2x10           SLR  2x10           RDL w/ cone tap  2x10 each leg           Mini squats  2x20           Prone quad stretch  1 min both sides           Hamstring stretch  1 min both sides           Ther Activity Bike   6 min           Lateral band walk  3 laps  OTB           Monster walk   3 laps   OTB           Gait Training                                       Modalities

## 2022-04-18 ENCOUNTER — EVALUATION (OUTPATIENT)
Dept: PHYSICAL THERAPY | Facility: CLINIC | Age: 22
End: 2022-04-18
Payer: COMMERCIAL

## 2022-04-18 DIAGNOSIS — M25.561 ACUTE PAIN OF BOTH KNEES: Primary | ICD-10-CM

## 2022-04-18 DIAGNOSIS — M25.562 ACUTE PAIN OF BOTH KNEES: Primary | ICD-10-CM

## 2022-04-18 PROCEDURE — 97110 THERAPEUTIC EXERCISES: CPT | Performed by: PHYSICAL THERAPIST

## 2022-04-18 PROCEDURE — 97140 MANUAL THERAPY 1/> REGIONS: CPT | Performed by: PHYSICAL THERAPIST

## 2022-04-18 NOTE — PROGRESS NOTES
PT Evaluation     Today's date: 2022  Patient name: Yovanny Ybarra  : 2000  MRN: [id number]  Referring provider: Eric Lomeli MD  Dx:   Encounter Diagnosis     ICD-10-CM    1  Acute pain of both knees  M25 561     M25 562                   Assessment  Assessment details: Yovanny Ybarra is a 24 y o  female who presents with increased B/L knee pain consistent with referring diagnosis of Acute pain of both knees  (primary encounter diagnosis) that is complex secondary to insidious onset, moderate fear avoidance and pain levels  Clinically demonstrates decreased B/L knee/quad/hamstring length and ROM, decreased hip and quad/hamstring strength, limited LE proprioception leading to pain with ADLs and exercise  This suggests the need for skilled OPPT to address the above listed impairments, achieve established goals and return to PLOF pain-free  If you have any questions or concerns please contact me at [phone]  Thank you! Re-assessment 2022: Pt presents ~ 3 weeks after her initial evaluation and reports a GROC of 100%  Pt demonstrates improvements in LE coordination, knee ROM, and strength of the quads, hamstrings, and hip muscles leading to decreased bilateral knee valgus during movement and resolved bilateral knee pain  Pt will be discharged with HEP to continue to improve LE strength with her own exercise program    Impairments: impaired physical strength    Symptom irritability: lowUnderstanding of Dx/Px/POC: good   Prognosis: good    Goals  Short Term Goals (4 weeks)  1 ) Establish independence with HEP  MET  2 ) Decrease subjective pain levels from NPRS at least to 2-5/10 at rest and with activity  MET  3 ) Improve knee ROM at least 5-10 degrees into all planes to allow for improved ease of movement with less   MET    Long Term Goals (8 weeks)  1 ) Improve B/L knee ROM to WNL in all planes to restore normal movement with ADLs and function   MET  2 ) Improve quad/hamstring and glute medius strength to 5/5 in all planes in order to return to pain-free ADLs and function  Partially MET  3 ) Improve FOTO score at least to 75 points showing improved self reported disability  MET    Plan  Plan details: Discharge with HEP to continue LE strengthening program   Referral necessary: No  Planned therapy interventions: home exercise program  Plan of Care beginning date: 3/24/2022  Plan of Care expiration date: 2022  Treatment plan discussed with: patient        Subjective Evaluation    History of Present Illness  Date of onset: 3/3/2022  Mechanism of injury: Angelika Pollard is a 24 y o  female who presents with increased B/L knee pain starting ~ 3 weeks ago with DESIRE- notes that she started knees give out and buckle and noted that on the stairs having her knees have more pain and impact- will lead to falls down the stairs  Notes 5-6 falls down the stairs  Decided to seek out MD and was denied any X-rays were script for OPPT provided  Denies night pain or changes in bowel or bladder function  Denies any NT signs or sxs- just aching in her quads worse after walking up a hill or negotiating stairs  Denies any F/U with MD   Wishes to make her knees stronger and reduce fall occurrence  Re-assessment 2022: Pt reports that the pain in both knees has not been present for a couple weeks now  She felt a little soreness after last weeks session but it subsided within a couple of days  She no longer feels that she needs physical therapy and can continue strengthening exercises on her own            Not a recurrent problem   Quality of life: good    Pain  Current pain ratin  At best pain ratin  At worst pain ratin  Location: B/L knees  Relieving factors: rest, relaxation, support and change in position  Progression: resolved    Social Support    Employment status: working ( at [employer])  Exercise history: Yoga, core strength; elliptical at [employer] and bike 2-3   [employer] stress: Student    Patient Goals  Patient goal: Patient feels she has achieved all her goals        Objective     Active Range of Motion   Left Knee   Flexion: 150 degrees   Prone flexion: 135 degrees   Extension: 0 degrees   Extensor la degrees     Right Knee   Flexion: 150 degrees   Prone flexion: 135 degrees   Extension: 0 degrees   Extensor la degrees     Strength/Myotome Testing     Left Hip   Planes of Motion   Flexion: 4+  Abduction: 4-  Adduction: 4+  External rotation: 5  Internal rotation: 5    Right Hip   Planes of Motion   Flexion: 4+  Abduction: 4-  Adduction: 4+  External rotation: 5  Internal rotation: 5    Left Knee   Flexion: 5  Extension: 4+  Quadriceps contraction: good    Right Knee   Flexion: 5  Extension: 4+  Quadriceps contraction: good    Swelling     Left Knee Girth Measurement (cm)   Joint line: 35 5 cm    Right Knee Girth Measurement (cm)   Joint line: 35 5 cm    Functional Assessment      Squat    Left within functional limits and right within functional limits  No left varus and no right varus  Single Leg Squat   Left Leg  Valgus  No left trunk side bending and no tibial anterior translation beyond toes  Right Leg  Valgus  No left trunk side bending and no tibial anterior translation beyond toes       Single Leg Stance   Left: 30 seconds  Right: 30 seconds  Neuro Exam:     Functional outcomes   5x sit to stand: 13 51 (seconds)    Manuals 3/24 4/11 4          B/L patellar mob             Knee PROM             HS and Gastroc stretch             Luc stretch             Neuro Re-Ed             SLS on foam pad  3x 30s each side 3x 30s each side                                                                                        Ther Ex             SLR x 3 10x ea            Clamshell  2x10 2x10          Bridges 10x 2x20 2x10          SL bridge 10x 2x10 each leg 2x10 ea leg          Sit to stand 10x            Side lying hip ABD  2x10 2x10          SLR  2x10 2x10 RDL w/ cone tap  2x10 each leg           Mini squats  2x20 2x20          Prone quad stretch  1 min both sides           Hamstring stretch  1 min both sides           Ther Activity             Bike   6 min 6 min          Lateral band walk  3 laps  OTB 3 laps OTB          Monster walk   3 laps   OTB 3 laps OTB          Gait Training                                       Modalities                                                  Precautions: Headaches  EPOC: 04/18/2022  HEP:   Glute Bridges  Single Leg Bridges  Side Lying Hip ABD  Clamshells  SLR  Lateral Band Walks  Monster Walks  Mini Squat  SLS on Foam Pad  RDLs  Scapular Retractions  Scapular ER  Shoulder Ext

## 2022-04-19 ENCOUNTER — TELEPHONE (OUTPATIENT)
Dept: OTHER | Facility: OTHER | Age: 22
End: 2022-04-19

## 2022-05-09 ENCOUNTER — TELEPHONE (OUTPATIENT)
Dept: PSYCHIATRY | Facility: CLINIC | Age: 22
End: 2022-05-09

## 2022-05-09 NOTE — TELEPHONE ENCOUNTER
Behavorial Health Outpatient Intake Questions    Referred by: PCP    Please advised interviewee that they need to answer all questions truthfully to allow for best care and any misrepresentations of information may affect their ability to be seen at this clinic   => Was this discussed? Yes     [facility]     Presenting Problem (in patient's words): Depression     Are there any developmental disabilities? ? If yes, can they speak to you on the phone? If they are too limited to speak to you on phone, refer out No    Are you taking any psychiatric medications? No    => If yes, who prescribes? If yes, are they injectable medications? Does the patient have a language barrier or hearing impairment? No    Have you been treated at [facility] by a therapist or a doctor in the past? If yes, who? No    Has the patient been hospitalized for mental health? No   If yes, how long ago was last hospitalization and where was it? Do you actively use alcohol or marijuana or illegal substances? If yes, what and how much - refer out to Drug and alcohol treatment if use is excessive or daily use of illegal substances No concerns of substance abuse are reported  Do you have a community treatment team or ? No    Legal History-     Does the patient have any history of arrests, retirement/MCC time, or DUIs? No  If Yes-  1) What types of charges? 2) When were they last incarcerated? 3) Are they currently on parole or probation? Minor Child-    Who has custody of the child? Is there a custody agreement? If there is a custody agreement remind parent that they must bring a copy to the first appt or they will not be seen       Intake Team, please check with provider before scheduling if flags come up such as:  - complex case  - legal history (other than DUI)  - communication barrier concerns are present  - if, in your judgment, this needs further review    ACCEPTED as a patient Yes => Appointment Date: 5/17/2022 with Jenny Huertas    Referred Elsewhere? No    Name of Insurance Co: [place] SP#[id number]  Insurance Phone #  If ins is primary or secondary  If patient is a minor, parents information such as Name, D  O B of guarantor

## 2022-05-11 ENCOUNTER — OFFICE VISIT (OUTPATIENT)
Dept: OBGYN CLINIC | Facility: CLINIC | Age: 22
End: 2022-05-11
Payer: COMMERCIAL

## 2022-05-11 VITALS
WEIGHT: 176.8 LBS | HEIGHT: 67 IN | BODY MASS INDEX: 27.75 KG/M2 | SYSTOLIC BLOOD PRESSURE: 118 MMHG | DIASTOLIC BLOOD PRESSURE: 80 MMHG

## 2022-05-11 DIAGNOSIS — Z30.014 ENCOUNTER FOR INITIAL PRESCRIPTION OF INTRAUTERINE CONTRACEPTIVE DEVICE (IUD): Primary | ICD-10-CM

## 2022-05-11 PROCEDURE — 99213 OFFICE O/P EST LOW 20 MIN: CPT | Performed by: OBSTETRICS & GYNECOLOGY

## 2022-05-11 NOTE — PROGRESS NOTES
Assessment/Plan:    No problem-specific Assessment & Plan notes found for this encounter  Diagnoses and all orders for this visit:    Encounter for initial prescription of intrauterine contraceptive device (IUD)           Subjective:      Patient ID: Austen Cooper is a 25 y o  female  HPI    Patient is a 25year old female presenting for IUD consult  Currently taking Tri-Lo-Sprintec  Also taking Prozac 20 mg and Topamax for migraines  LMP 5/3/22  Periods are regular every month  Last 7 days  No significant heavy bleeding or cramping  Patient is sexually active  Denies fever, CP, SOB, swelling, HA, abdominal/pelvic pain, bowel/bladder changes  Does not smoke  Total time of today's visit was 20 minutes of which greater than 50% was spent face-to-face counseling the patient as well as coordination of care, review of chart laboratory values, physical evaluation as well as computer entry into the epic medical record system  The following portions of the patient's history were reviewed and updated as appropriate: allergies, current medications, past family history, past medical history, past social history, past surgical history and problem list     Review of Systems   Constitutional: Negative  Negative for appetite change, diaphoresis, fatigue, fever and unexpected weight change  HENT: Negative  Eyes: Negative  Respiratory: Negative  Cardiovascular: Negative  Gastrointestinal: Negative  Negative for abdominal pain, blood in stool, constipation, diarrhea, nausea and vomiting  Endocrine: Negative  Negative for cold intolerance and heat intolerance  Genitourinary: Negative  Negative for dysuria, frequency, hematuria, urgency, vaginal bleeding, vaginal discharge and vaginal pain  Musculoskeletal: Negative  Skin: Negative  Allergic/Immunologic: Negative  Neurological: Negative  Followed for migraines    Hematological: Negative  Negative for adenopathy  Psychiatric/Behavioral: Negative  Objective:      /80 (BP Location: Left arm, Patient Position: Sitting, Cuff Size: Standard)   Ht 5' 7" (1 702 m)   Wt 80 2 kg (176 lb 12 8 oz)   LMP 05/03/2022   BMI 27 69 kg/m²          Physical Exam  Constitutional:       Appearance: She is well-developed  HENT:      Head: Normocephalic  Eyes:      Pupils: Pupils are equal, round, and reactive to light  Cardiovascular:      Rate and Rhythm: Normal rate and regular rhythm  Pulmonary:      Effort: Pulmonary effort is normal       Breath sounds: Normal breath sounds  Abdominal:      Palpations: Abdomen is soft  Musculoskeletal:         General: Normal range of motion  Cervical back: Normal range of motion and neck supple  Skin:     General: Skin is warm and dry  Neurological:      General: No focal deficit present  Mental Status: She is alert and oriented to person, place, and time  Psychiatric:         Mood and Affect: Mood normal          Behavior: Behavior normal          Thought Content:  Thought content normal          Judgment: Judgment normal

## 2022-05-12 DIAGNOSIS — N92.0 MENORRHAGIA WITH REGULAR CYCLE: ICD-10-CM

## 2022-05-12 RX ORDER — NORGESTIMATE AND ETHINYL ESTRADIOL 7DAYSX3 LO
1 KIT ORAL DAILY
Qty: 84 TABLET | Refills: 1 | Status: SHIPPED | OUTPATIENT
Start: 2022-05-12 | End: 2022-06-16

## 2022-05-17 ENCOUNTER — OFFICE VISIT (OUTPATIENT)
Dept: OBGYN CLINIC | Facility: CLINIC | Age: 22
End: 2022-05-17
Payer: COMMERCIAL

## 2022-05-17 ENCOUNTER — TELEMEDICINE (OUTPATIENT)
Dept: PSYCHIATRY | Facility: CLINIC | Age: 22
End: 2022-05-17

## 2022-05-17 ENCOUNTER — DOCUMENTATION (OUTPATIENT)
Dept: PSYCHIATRY | Facility: CLINIC | Age: 22
End: 2022-05-17

## 2022-05-17 VITALS
SYSTOLIC BLOOD PRESSURE: 120 MMHG | WEIGHT: 167 LBS | HEIGHT: 67 IN | BODY MASS INDEX: 26.21 KG/M2 | DIASTOLIC BLOOD PRESSURE: 90 MMHG

## 2022-05-17 DIAGNOSIS — Z30.09 COUNSELING FOR BIRTH CONTROL REGARDING INTRAUTERINE DEVICE (IUD): Primary | ICD-10-CM

## 2022-05-17 DIAGNOSIS — N88.2 CERVICAL STENOSIS (UTERINE CERVIX): ICD-10-CM

## 2022-05-17 DIAGNOSIS — Z11.3 SCREENING EXAMINATION FOR STD (SEXUALLY TRANSMITTED DISEASE): ICD-10-CM

## 2022-05-17 DIAGNOSIS — F32.A DEPRESSION, UNSPECIFIED DEPRESSION TYPE: Primary | ICD-10-CM

## 2022-05-17 PROCEDURE — 99241 PR OFFICE CONSULTATION NEW/ESTAB PATIENT 15 MIN: CPT | Performed by: NURSE PRACTITIONER

## 2022-05-17 RX ORDER — MISOPROSTOL 200 UG/1
600 TABLET ORAL ONCE
Qty: 3 TABLET | Refills: 0 | Status: SHIPPED | OUTPATIENT
Start: 2022-05-17 | End: 2022-05-17

## 2022-05-17 NOTE — PSYCH
Assessment/Plan:      Diagnoses and all orders for this visit:    Depression, unspecified depression type          Subjective:      Patient ID: Marko Dutta is a 25 y o  female presenting with depression and anxiety  She stated this became an issue after a bad breakup (1st real boyfriend that she loved) and while she was in [facility]  She struggles with falling asleep due to overthinking, doesn't want to do things that she normally loves doing (writing), trouble getting out of bed in the morning and lack of motivation/energy to get her day going (even if it messes up her plans)  [employer] stated she's felt her eye "twitching" when feeling very stressed, which isn't typical  Her hands shake at times  [employer] states she assumes the worst will happen when she goes into a situation  [employer] stated her symptoms might be elevated right now because she's been taking finals for the last 2 weeks and she graduates next week  HPI:     Pre-morbid level of function and History of Present Illness: [employer] states she's struggled with depression since she was a teenager  Previous Psychiatric/psychological treatment/year: Has seen a psychiatrist in the last year; she went after going through a difficult breakup  She stopped going after a few appointments      Current Psychiatrist/Therapist: N/A  Outpatient and/or Partial and Other Community Resources Used (CTT, ICM, VNA): Outpatient psychiatry      Problem Assessment:     SOCIAL/VOCATION:  Family Constellation (include parents, relationship with each and pertinent Psych/Medical History):     Family History   Problem Relation Age of Onset    Diabetes Father     Hypertension Father     No Known Problems Mother     Hypertension Maternal Grandmother     Breast cancer Paternal Grandmother        Mother: very close with mom; [employer] states they are more like friends than mother/daughter; when her mother does things "motherly" things, such as enforce rules, it throws [employer] off and they have arguments at times  Spouse: N/A   Father: growing up she was very close to her dad; once she was in high school, she became more "liberal" and she feels he didn't take well to that; Dax Obrien states he doesn't always see her as an adult  She says they tend to argue more often  She thinks her father struggles with anxiety and anger issues  Children: N/A   Sibling: One younger sister; they get along for the "most part"  Other: N/A    Dax Obrien relates best to her mother  she lives with 4 college roommates but she's graduating soon and split time between living with aunt & cousin and her parents  She's helping her aunt due to health conditions  she does not live alone  Domestic Violence: No past history of domestic violence    Additional Comments related to family/relationships/peer support: boyfriend is a big support and has several close friends  School or Work History (strengths/limitations/needs): Son Ayala, graduated in 2018; Currently at [employer], will graduate in May 2022 with a [employer] in [place] and International Relations with minor in [facility] and a concentration in creative writing  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  No    FUNCTIONAL STATUS: There has been a recent change in Dax Obrien ability to do the following: no changes    Level of Assistance Needed/By Whom?: N/A    Dax Obrien learns best by  demonstration    SUBSTANCE ABUSE ASSESSMENT: no substance abuse    Substance/Route/Age/Amount/Frequency/Last Use: N/A    DETOX HISTORY: N/A    Previous detox/rehab treatment: N/A     HEALTH ASSESSMENT: no referral to PCP needed    LEGAL: No Mental Health Advance Directive or Power of  on file    Prenatal History: N/A    Delivery History: born by vaginal delivery    Developmental Milestones: Attended speech therapy from 3years old through 8th grade; got expander in her mouth to help with certain sounds as well as a tonsillectomy and adenoidectomy in 2nd grade; other milestones with in normal limits  Temperament as an infant was normal     Temperament as a toddler was normal   Temperament at school age was normal   Temperament as a teenager was normal     Risk Assessment:   The following ratings are based on my interview(s) with Dax Obrien    Risk of Harm to Self:   Demographic risk factors include   Historical Risk Factors include history of hitting herself; hasn't done this in over a year  Recent Specific Risk Factors include passive death wishes  Additional Factors for a Child or Adolescent gender: female (more likely to attempt)    Risk of Harm to Others:   Demographic Risk Factors include [age] years of age  Historical Risk Factors include N/A  Recent Specific Risk Factors include N/A    Access to Weapons:   Dax Obrien has access to the following weapons: none  The following steps have been taken to ensure weapons are properly secured: N/A    Based on the above information, the client presents the following risk of harm to self or others:  low    The following interventions are recommended:   no intervention changes    Notes regarding this Risk Assessment: No history of aggression or suicide attempts  Dax Obrien reports having passive death wishes at times but not currently  She said the thoughts have never gone any further than that; no plans or intent          Review Of Systems:     Mood Normal   Behavior Normal    Thought Content Normal   General Normal    Personality Normal   Other Psych Symptoms Normal   Constitutional Normal   ENT Normal   Cardiovascular Normal    Respiratory Normal    Gastrointestinal Normal   Genitourinary Normal    Musculoskeletal Negative   Integumentary Normal    Neurological Normal    Endocrine Normal          Mental status:  Appearance calm and cooperative , adequate hygiene and grooming and good eye contact    Mood mood appropriate   Affect affect appropriate    Speech a normal rate   Thought Processes normal thought processes   Hallucinations no hallucinations present    Thought Content no delusions   Abnormal Thoughts no suicidal thoughts  and no homicidal thoughts    Orientation  oriented to person, oriented to place and oriented to time   Remote Memory short term memory intact and long term memory intact   Attention Span concentration intact   Intellect Appears to be of Average Intelligence and Not [address] of Knowledge displays adequate knowledge of current events, adequate fund of knowledge regarding past history and adequate fund of knowledge regarding vocabulary    Insight Insight intact   Judgement judgment was intact   Muscle Strength N/A   Language no difficulty naming common objects, no difficulty repeating a phrase  and no difficulty writing a sentence    Pain none   Pain Scale 0       Virtual Regular Visit    Verification of patient location:    Patient is located in the following state in which I hold an active license [place]      Assessment/Plan:    Problem List Items Addressed This Visit    None     Visit Diagnoses     Depression, unspecified depression type    -  Primary          Goals addressed in session: Goal 1          Reason for visit is   Chief Complaint   Patient presents with    Virtual Regular Visit        Encounter provider Matt Guajardo LCSW    Provider located at [address] [place] [address] [facility]  [facility] [id number]  [phone]      Recent Visits  No visits were found meeting these conditions  Showing recent visits within past 7 days and meeting all other requirements  Future Appointments  No visits were found meeting these conditions  Showing future appointments within next 150 days and meeting all other requirements       The patient was identified by name and date of birth  Austen Cooper was informed that this is a telemedicine visit and that the visit is being conducted through[facility] and patient was informed that this is a secure, HIPAA-compliant platform  She agrees to proceed     My office door was closed  No one else was in the room  She acknowledged consent and understanding of privacy and security of the video platform  The patient has agreed to participate and understands they can discontinue the visit at any time  Patient is aware this is a billable service  Subjective  Austen Cooper is a 25 y o  female         HPI     Past Medical History:   Diagnosis Date    Depression     History of migraine     Low vitamin D level     Uses birth control        Past Surgical History:   Procedure Laterality Date    ADENOIDECTOMY      TONSILLECTOMY AND ADENOIDECTOMY         Current Outpatient Medications   Medication Sig Dispense Refill    cholecalciferol (VITAMIN D3) 1,000 units tablet Take 1,000 Units by mouth daily      ferrous sulfate 325 (65 Fe) mg tablet Take 325 mg by mouth daily at bedtime      Fiber Complete TABS Take 2 tablets by mouth daily      FLUoxetine (PROzac) 20 MG tablet Take 1 tablet (20 mg total) by mouth daily 30 tablet 1    hydrocortisone 1 % cream Apply topically 3 (three) times a day 30 g 0    Magnesium 400 MG CAPS Take 400 mg by mouth daily      methocarbamol (Robaxin-750) 750 mg tablet Take 1 tablet (750 mg total) by mouth every 6 (six) hours as needed for muscle spasms 20 tablet 0    norgestimate-ethinyl estradiol (Tri-Lo-Sprintec) 0 18/0 215/0 25 MG-25 MCG per tablet Take 1 tablet by mouth in the morning  84 tablet 1    ondansetron (Zofran ODT) 4 mg disintegrating tablet Take 1 tablet (4 mg total) by mouth every 6 (six) hours as needed for nausea or vomiting for up to 20 doses 20 tablet 0    polyethylene glycol (GLYCOLAX) 17 GM/SCOOP powder Take 17 g by mouth daily 507 g 0    rizatriptan (MAXALT-MLT) 10 MG disintegrating tablet Take 1 tablet (10 mg total) by mouth once as needed for migraine for up to 1 dose May repeat in 2 hours if needed 9 tablet 5    topiramate (TOPAMAX) 25 mg tablet 2 tab po qhs 180 tablet 3     No current facility-administered medications for this visit  Allergies   Allergen Reactions    Augmentin [Amoxicillin-Pot Clavulanate] Diarrhea    Fish-Derived Products - Food Allergy Vomiting       Review of Systems    Video Exam    There were no vitals filed for this visit  Physical Exam     I spent 44 minutes directly with the patient during this visit    VIRTUAL VISIT [id number] verbally agrees to participate in [employer]  Pt is aware that [employer] could be limited without vital signs or the ability to perform a full hands-on physical exam  Sunita Carranza understands she or the provider may request at any time to terminate the video visit and request the patient to seek care or treatment in person

## 2022-05-17 NOTE — PROGRESS NOTES
Subjective      Delores Galvan is a 25 y o  female patient of Dr Mukul Ramirez, who presents for IUD consult  She is interested in transitioning to the IUD  She would like a method she does not have to remember to take on daily basis  The patient is sexually active, in a stable relationship for 10 months  Denies risk of STD  Pertinent past medical history: migraines  IUD options reviewed to include Georgina Terri, or Mirena  Menstrual History:    OB History        0    Para   0    Term   0       0    AB   0    Living   0       SAB   0    IAB   0    Ectopic   0    Multiple   0    Live Births   0                  Patient's last menstrual period was 2022  The following portions of the patient's history were reviewed and updated as appropriate: allergies, current medications, past family history, past medical history, past social history, past surgical history and problem list     Review of Systems  Pertinent items are noted in HPI  Objective      /90   Ht 5' 7" (1 702 m)   Wt 75 8 kg (167 lb)   LMP 2022   BMI 26 16 kg/m²     Assessment and Plan    Kiki Starr was seen today for consult  Diagnoses and all orders for this visit:    Counseling for birth control regarding intrauterine device (IUD)  -     Cancel: Chlamydia/GC amplified DNA by PCR  -     Cancel: Chlamydia/GC amplified DNA by PCR    Screening examination for STD (sexually transmitted disease)  -     Cancel: Chlamydia/GC amplified DNA by PCR  -     Cancel: Chlamydia/GC amplified DNA by PCR  -     Chlamydia/GC amplified DNA by PCR    Cervical stenosis (uterine cervix)  -     misoprostol (CYTOTEC) 200 mcg tablet; Take 3 tablets (600 mcg total) by mouth once for 1 dose Take night before IUD insertion       25 y o , interested in a Mirena IUD  Cytotec prescribed for cervical ripening, to be take the night before insertion   She was also instructed to take 600 mg of Ibuprofen the night before and morning of insertion  As well as to ensure that she eats prior to her procedure  Follow up in 2 weeks

## 2022-05-26 ENCOUNTER — TELEPHONE (OUTPATIENT)
Dept: NEUROLOGY | Facility: CLINIC | Age: 22
End: 2022-05-26

## 2022-05-26 DIAGNOSIS — E23.7 PITUITARY ABNORMALITY (HCC): ICD-10-CM

## 2022-05-26 DIAGNOSIS — R51.9 FREQUENT HEADACHES: ICD-10-CM

## 2022-05-26 DIAGNOSIS — M54.2 NECK PAIN: Primary | ICD-10-CM

## 2022-05-26 RX ORDER — ONDANSETRON 4 MG/1
4 TABLET, ORALLY DISINTEGRATING ORAL EVERY 6 HOURS PRN
Qty: 20 TABLET | Refills: 0 | Status: SHIPPED | OUTPATIENT
Start: 2022-05-26

## 2022-05-26 NOTE — TELEPHONE ENCOUNTER
Rev last note from oct  Pt with some new issues since our last visit, ie shakiness of hands, neck and shoulder pains, gi issues  If any new or acute focal sxs, pt should go to er  Ok to bring pt in with Swetha Henley or me or headache AP for re eval   I would recommend zofran to be used to help at time of headache rufino if any gi related sxs  I am also going to try to get mra head as well for pt to look at her vessels  Any family history of aneursyms? I would still recommend gi evaluation as well  Pt's neck and back issues could also contribute to increase in headaches  I will also place order for mri c spine as well  Again these are new sxs since last visit  If any worsening, needs to go to er  Looks like pt is also following with endo now as well  Not sure why shakiness of hands or now weakness as well  These should be unrelated to her migraines  rec medical workup as well  I will check some baseline labs too  rx in emr

## 2022-05-26 NOTE — TELEPHONE ENCOUNTER
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    ----- Message -----  From: Shelly Dumas  Sent: 5/25/2022   1:51 PM EDT  To: Neurology [facility] Clinical Team 3  Subject: Gastro distress, shaky hands and muscle weak#    Hi Dr Simona Mcintosh,  I just scheduled an appointment but it's so far out I wanted to reach out and ask about some issues I've been having  First is some gastro distress -- my primary care doctor said to see you before I go to a gastro doctor  I've been having regular headaches that are very different from my migraines (located separately, feel different) that then lead into intense gastro distress that only dissipates if I throw up  Do you have any idea what this could be? I've also been dealing with shaky hands, back pain along my neck and just below, primarily there and on my right shoulder, and muscle weakness  I went to physical therapy, to help my legs, but it's gotten to the point where my hands continually shake and I cannot do certain things with my hands for extended periods, like type or clasp my bra  Often, the back pain leads into the aforementioned headaches, and I can't function as usual  This is very scary, and I'm not sure why this is happening  Is there anything I can do? Do you have any ideas what could be occurring? Please let me know, and I will see you on 10/19      Best wishes,   Boo Sanchez

## 2022-06-06 ENCOUNTER — LAB (OUTPATIENT)
Dept: LAB | Facility: HOSPITAL | Age: 22
End: 2022-06-06
Payer: COMMERCIAL

## 2022-06-06 DIAGNOSIS — M54.2 NECK PAIN: ICD-10-CM

## 2022-06-06 DIAGNOSIS — Z13.220 SCREENING FOR HYPERLIPIDEMIA: ICD-10-CM

## 2022-06-06 DIAGNOSIS — E23.7 PITUITARY ABNORMALITY (HCC): ICD-10-CM

## 2022-06-06 LAB
ALBUMIN SERPL BCP-MCNC: 4 G/DL (ref 3.5–5)
ALP SERPL-CCNC: 70 U/L (ref 46–116)
ALT SERPL W P-5'-P-CCNC: 23 U/L (ref 12–78)
ANION GAP SERPL CALCULATED.3IONS-SCNC: 7 MMOL/L (ref 4–13)
AST SERPL W P-5'-P-CCNC: 16 U/L (ref 5–45)
BILIRUB SERPL-MCNC: 0.37 MG/DL (ref 0.2–1)
BUN SERPL-MCNC: 13 MG/DL (ref 5–25)
CALCIUM SERPL-MCNC: 9.3 MG/DL (ref 8.3–10.1)
CHLORIDE SERPL-SCNC: 107 MMOL/L (ref 100–108)
CHOLEST SERPL-MCNC: 183 MG/DL
CK SERPL-CCNC: 88 U/L (ref 26–192)
CO2 SERPL-SCNC: 24 MMOL/L (ref 21–32)
CREAT SERPL-MCNC: 0.74 MG/DL (ref 0.6–1.3)
GFR SERPL CREATININE-BSD FRML MDRD: 115 ML/MIN/1.73SQ M
GLUCOSE P FAST SERPL-MCNC: 80 MG/DL (ref 65–99)
HDLC SERPL-MCNC: 57 MG/DL
LDLC SERPL CALC-MCNC: 119 MG/DL (ref 0–100)
POTASSIUM SERPL-SCNC: 3.8 MMOL/L (ref 3.5–5.3)
PROLACTIN SERPL-MCNC: 9.6 NG/ML
PROT SERPL-MCNC: 7.9 G/DL (ref 6.4–8.2)
SODIUM SERPL-SCNC: 138 MMOL/L (ref 136–145)
T4 FREE SERPL-MCNC: 1.01 NG/DL (ref 0.76–1.46)
TRIGL SERPL-MCNC: 36 MG/DL
TSH SERPL DL<=0.05 MIU/L-ACNC: 1.23 UIU/ML (ref 0.45–4.5)

## 2022-06-06 PROCEDURE — 82550 ASSAY OF CK (CPK): CPT

## 2022-06-06 PROCEDURE — 84146 ASSAY OF PROLACTIN: CPT

## 2022-06-06 PROCEDURE — 36415 COLL VENOUS BLD VENIPUNCTURE: CPT

## 2022-06-06 PROCEDURE — 80053 COMPREHEN METABOLIC PANEL: CPT

## 2022-06-06 PROCEDURE — 86618 LYME DISEASE ANTIBODY: CPT

## 2022-06-06 PROCEDURE — 84443 ASSAY THYROID STIM HORMONE: CPT

## 2022-06-06 PROCEDURE — 87491 CHLMYD TRACH DNA AMP PROBE: CPT | Performed by: NURSE PRACTITIONER

## 2022-06-06 PROCEDURE — 84439 ASSAY OF FREE THYROXINE: CPT

## 2022-06-06 PROCEDURE — 87591 N.GONORRHOEAE DNA AMP PROB: CPT | Performed by: NURSE PRACTITIONER

## 2022-06-06 PROCEDURE — 80061 LIPID PANEL: CPT

## 2022-06-06 NOTE — TELEPHONE ENCOUNTER
Pt made aware of all of the below, she verbalizes understanding and is agreeable to all recommendations  Pt reports her grandfather had an aortic aneurysm  She scheduled all ordered imaging for 6/28/22  She is interested in sooner appt with Veronica once imaging is complete  Pt accepted first available appt after imaging for 8/2/22  Pt confirmed PCP, phone number, and insurance information on file with no changes  Appt placed on wait list  Pt noted she will be on vacation 7/8/22-7/17/22  FYI

## 2022-06-06 NOTE — TELEPHONE ENCOUNTER
Please follow up on below  Pt had labs done, still awaiting full panel  Ck and cmp ok  Please make sure pt has imaging scheduled  Ok for move up appt with enrike if ongoing sxs

## 2022-06-06 NOTE — TELEPHONE ENCOUNTER
Thanks so much Torri  If any acute sxs, she will need to go to er    If any issues with adls, she will need a more acute eval

## 2022-06-07 ENCOUNTER — TELEMEDICINE (OUTPATIENT)
Dept: PSYCHIATRY | Facility: CLINIC | Age: 22
End: 2022-06-07

## 2022-06-07 DIAGNOSIS — F32.A DEPRESSION, UNSPECIFIED DEPRESSION TYPE: Primary | ICD-10-CM

## 2022-06-07 LAB
C TRACH DNA SPEC QL NAA+PROBE: NEGATIVE
N GONORRHOEA DNA SPEC QL NAA+PROBE: NEGATIVE

## 2022-06-08 LAB — B BURGDOR IGG+IGM SER-ACNC: 43

## 2022-06-13 ENCOUNTER — TELEPHONE (OUTPATIENT)
Dept: OBGYN CLINIC | Facility: CLINIC | Age: 22
End: 2022-06-13

## 2022-06-13 NOTE — TELEPHONE ENCOUNTER
Patient calling, did get menses yet- scheduled for iud insertion 6-14  She will call when she starts her menses, she is aware Selene will be on vacation next week and is willing to schedule with Dr Merry Shah if necessary  Rx sent to the pharmacy.

## 2022-06-16 ENCOUNTER — PROCEDURE VISIT (OUTPATIENT)
Dept: OBGYN CLINIC | Facility: CLINIC | Age: 22
End: 2022-06-16
Payer: COMMERCIAL

## 2022-06-16 VITALS
WEIGHT: 179 LBS | DIASTOLIC BLOOD PRESSURE: 70 MMHG | HEIGHT: 67 IN | SYSTOLIC BLOOD PRESSURE: 110 MMHG | BODY MASS INDEX: 28.09 KG/M2

## 2022-06-16 DIAGNOSIS — Z30.430 ENCOUNTER FOR IUD INSERTION: Primary | ICD-10-CM

## 2022-06-16 PROCEDURE — 58300 INSERT INTRAUTERINE DEVICE: CPT | Performed by: NURSE PRACTITIONER

## 2022-06-16 NOTE — PROGRESS NOTES
Shelly Dumas 26 yo female presents here for IUD insertion  She took 600mg cytotec last night  She took 600mg today around 1300  Patient's last menstrual period was 06/14/2022  Iud insertions    Date/Time: 6/16/2022 2:41 PM  Performed by: OMER De Dios  Authorized by: Kobe Mari MD   Universal Protocol:  Consent: Verbal consent obtained  Risks and benefits: risks, benefits and alternatives were discussed  Consent given by: patient  Timeout called at: 6/16/2022 2:41 PM   Patient understanding: patient states understanding of the procedure being performed  Patient identity confirmed: verbally with patient        Procedure:     Pelvic exam performed: yes      Negative GC/chlamydia test: yes      Negative urine pregnancy test: LMP 6/14/22  Cervix cleaned and prepped: yes      Speculum placed in vagina: yes      Tenaculum applied to cervix: yes      Uterus sounded: yes      Uterus sound depth (cm):  7 5    IUD inserted with no complications: yes      IUD type:  Mirena    Strings trimmed: yes    Post-procedure:     Patient tolerated procedure well: yes      Patient will follow up after next period: yes    Comments:      IUD inserted without difficulty  A bedside US was performed and shows no evidence of perforation  Patient given instruction booklet  She is to return in 5 weeks for follow up  Boo Sanchez was seen today for procedure      Diagnoses and all orders for this visit:    Encounter for IUD insertion  -     levonorgestrel (MIRENA) IUD 20 mcg/day  -     Iud insertions

## 2022-06-20 ENCOUNTER — TELEMEDICINE (OUTPATIENT)
Dept: PSYCHIATRY | Facility: CLINIC | Age: 22
End: 2022-06-20

## 2022-06-20 DIAGNOSIS — F32.A DEPRESSION, UNSPECIFIED DEPRESSION TYPE: Primary | ICD-10-CM

## 2022-06-20 NOTE — PSYCH
INDIVIDUAL SESSION NOTE     Length of time in session: 52 minutes, follow up in 2 weeks     Psychotherapy Provided: Individual Psychotherapy 52 minutes      Diagnosis ICD-10-CM Associated Orders   1  Depression, unspecified depression type  F32  A           Goals addressed in session: Goal 1     Pain:      none    0    Current suicide risk:  minimal    Data: Met with Óscar Moran for scheduled individual session  Topics discussed included family stressors and mood regulation and symptoms  Will De La Fuente lives at home part-time between parent's and aunt's--she moved out of college apartment  Will De La Fuente feels a bit unsettled because she's in between places, saying it's taxing  She stays with her aunt a few days a week because her aunt lost her  in April and is extremely depressed  Will De La Fuente stated she starts to feel down after spending a few days with her aunt, just because how down her aunt is  Will De La Fuente said she wants to unpack her things at her parents house and get organized  She feels a lot of pressure between her aunt and her parents  Will De La Fuente feels like her dad puts pressure on her to help her aunt (which is his sister)  Sunita's uncles are not stepping up and helping, nor are any of Sunita's cousins  Will De La Fuente has also been feeling stressed by her younger sister  She feels her parents baby her and she doesn't step up to help out with anything in the home or with the family  Will De La Fuente feels that when her sister doesn't do things to help, etc that it comes back on Will De La Fuente to do it  She also deals with chronic pain, that her doctor is running tests for and her family sometimes makes her feel bad for it or invalidate her pain  Will De La Fuente shows evidence of utilizing weighing pros and cons and emotion regulation skills skills to manage mental health symptoms  During this session, this clinical used the following therapeutic modalities engagement strategies, supportive psychotherapy and client-centered therapy   Clinician provided psychoeducation regarding use of assertive communication and drawing boundaries with others      Assessment:  Aurelia Kam presents with a normal mood  her affect is normal range and intensity, appropriate  Aurelia Kam was oriented x3  She was focused and engaged  Aurelia Kam exhibits early engagement with this clinician  she continues to exhibit willingness to work on treatment goals and objectives  Aurelia Kam presents with a minimal risk of suicide, minimal risk of self-harm and minimal of harm to others  Plan:  Aurelia Kam will return in 2 weeks for the next scheduled session  Between sessions, she  will work on assertive communication and will report back during the next session re: success and barriers  At the next session, this clinical will use engagement strategies, supportive psychotherapy and CBT techniques to address her depression, in effort to assist Aurelia Kam with meeting treatment goals  Behavioral Health Treatment Plan [facility]: Diagnosis and Treatment Plan explained to Santiago Vanessa relates understanding diagnosis and is agreeable to Treatment Plan  Yes     Virtual Regular Visit    Verification of patient location:    Patient is located in the following state in which I hold an active license [place]      Assessment/Plan:    Problem List Items Addressed This Visit    None     Visit Diagnoses     Depression, unspecified depression type    -  Primary          Goals addressed in session: Goal 1          Reason for visit is   Chief Complaint   Patient presents with    Virtual Regular Visit        Encounter provider Gallo Leon LCSW    Provider located at [address] [place] [id number] [phone]      Recent Visits  No visits were found meeting these conditions  Showing recent visits within past 7 days and meeting all other requirements  Future Appointments  No visits were found meeting these conditions    Showing future appointments within next 150 days and meeting all other requirements       The patient was identified by name and date of birth  Eldon Sadler was informed that this is a telemedicine visit and that the [facility] and patient was informed that this is a secure, HIPAA-compliant platform  She agrees to proceed     My office door was closed  No one else was in the room  She acknowledged consent and understanding of privacy and security of the video platform  The patient has agreed to participate and understands they can discontinue the visit at any time  Patient is aware this is a billable service  Subjective  Eldon Sadler is a 25 y o  female  HPI     Past Medical History:   Diagnosis Date    Depression     History of migraine     Low vitamin D level     Uses birth control        Past Surgical History:   Procedure Laterality Date    ADENOIDECTOMY      TONSILLECTOMY AND ADENOIDECTOMY         Current Outpatient Medications   Medication Sig Dispense Refill    cholecalciferol (VITAMIN D3) 1,000 units tablet Take 1,000 Units by mouth daily      ferrous sulfate 325 (65 Fe) mg tablet Take 325 mg by mouth daily at bedtime      Fiber Complete TABS Take 2 tablets by mouth daily      FLUoxetine (PROzac) 20 MG tablet Take 1 tablet (20 mg total) by mouth daily 30 tablet 1    Magnesium 400 MG CAPS Take 400 mg by mouth daily      misoprostol (CYTOTEC) 200 mcg tablet Take 3 tablets (600 mcg total) by mouth once for 1 dose Take night before IUD insertion 3 tablet 0    ondansetron (Zofran ODT) 4 mg disintegrating tablet Take 1 tablet (4 mg total) by mouth every 6 (six) hours as needed for nausea or vomiting 20 tablet 0    topiramate (TOPAMAX) 25 mg tablet 2 tab po qhs 180 tablet 3     No current facility-administered medications for this visit          Allergies   Allergen Reactions    Augmentin [Amoxicillin-Pot Clavulanate] Diarrhea    Fish-Derived Products - Food Allergy Vomiting       Review of Systems    Video Exam    There were no vitals filed for this visit  Physical Exam     I spent 52 minutes directly with the patient during this visit    VIRTUAL VISIT [id number] verbally agrees to participate in [employer]  Pt is aware that [employer] could be limited without vital signs or the ability to perform a full hands-on physical exam  Sunita Carranza understands she or the provider may request at any time to terminate the video visit and request the patient to seek care or treatment in person

## 2022-06-20 NOTE — BH TREATMENT PLAN
Harsha Mckeon  2000       Date of Initial Treatment Plan: 6/20/22   Date of Current Treatment Plan: 06/20/22        Treatment Plan Number: 1     Strengths/Personal Resources for Self Care: Nadine Knutson is kind and independent as well as shows motivation and readiness for change and managing surrounding demands and opportunities  Diagnosis:   1  Depression, unspecified depression type         Area of Needs: stress management, challenging maladaptive behaviors and thinking patterns and emotional support distress tolerance skills      Long Term Goal 1:  Nadnie Knutson will work to develop healthy thinking patterns and beliefs about self and others that lead to the alleviation and help prevent the relapse of depression      Target Date: 6/20/23  Completion Date: to be determined         Short Term Objectives for Goal 1:      Sathish Irwin will describe current and past experiences with depression including their impact on functioning and attempts to        resolve it  Ronald eVlarde will learn and implement problem-solving and decision-making skills (defining a problem, generating possible        solutions, evaluating pros and cons, selecting and implementing a plan, accepting or revising the plan )     C  Nadine Knutson will verbalize an understanding and resolution of current interpersonal problems (for interpersonal disputes,        exploring the nature of the dispute, whether it has reached an impasse and available options to resolve the conflict and/or                   impasse or consideration of ending the relationship)  Goal 1: Modality: Individual 2x per month   Completion Date to be determined and The person(s) responsible for carrying out the plan is  this clinician and Nadine Knutson  Behavioral Health Treatment Plan [facility]: Diagnosis and Treatment Plan explained to Duey Payor relates understanding diagnosis and is agreeable to Treatment Plan         Client Comments : Please share your thoughts, feelings, need and/or experiences regarding your treatment plan: agreeable

## 2022-06-28 ENCOUNTER — HOSPITAL ENCOUNTER (OUTPATIENT)
Dept: MRI IMAGING | Facility: HOSPITAL | Age: 22
Discharge: HOME | End: 2022-06-28
Attending: PSYCHIATRY & NEUROLOGY
Payer: COMMERCIAL

## 2022-06-28 DIAGNOSIS — E23.7 PITUITARY ABNORMALITY (HCC): ICD-10-CM

## 2022-06-28 DIAGNOSIS — M54.2 NECK PAIN: ICD-10-CM

## 2022-06-28 DIAGNOSIS — R51.9 FREQUENT HEADACHES: ICD-10-CM

## 2022-06-28 PROCEDURE — G1004 CDSM NDSC: HCPCS

## 2022-06-28 PROCEDURE — 70544 MR ANGIOGRAPHY HEAD W/O DYE: CPT

## 2022-06-28 PROCEDURE — 72141 MRI NECK SPINE W/O DYE: CPT

## 2022-07-06 ENCOUNTER — TELEMEDICINE (OUTPATIENT)
Dept: PSYCHIATRY | Facility: CLINIC | Age: 22
End: 2022-07-06
Payer: COMMERCIAL

## 2022-07-06 DIAGNOSIS — F32.A DEPRESSION, UNSPECIFIED DEPRESSION TYPE: Primary | ICD-10-CM

## 2022-07-06 PROCEDURE — 90834 PSYTX W PT 45 MINUTES: CPT

## 2022-07-06 NOTE — PSYCH
INDIVIDUAL SESSION NOTE     Length of time in session: 53 minutes, follow up in 2 weeks     Psychotherapy Provided: Individual Psychotherapy 53 minutes      Diagnosis ICD-10-CM Associated Orders   1  Depression, unspecified depression type  F32  A           Goals addressed in session: Goal 1     Pain:      none    0    Current suicide risk:  minimal    Data: Met with Bianka Spears for scheduled individual session  Topics discussed included family stressors  Candace Gifford stated she's doing "ok"; she had a migraine on the 4th of July so she didn't get to do too much  She stated her parents have been "on" her about her attitude; she explained there are times that she just doesn't feel well but they do not want to accept that as an answer  Candace Gifford fell up a few steps while doing chores and her dad made the comment that it was a "convenient time" for her to fall; she felt he was insinuating that she faked it somehow  This really upset Candace Gifford so she gave her dad the "silent treatment"  She said he eventually apologized, which is something her dad very rarely does  Candace Gifford felt good that he actually made an effort  She went on to say that she and her mother had an argument recently  Her uncle is going to stay at their family's home for a week while they go on vacation and her mother is worked up about cleaning the house  Candace Gifford said her mother has been hard on her, which she thinks is because she is stressed  Candace Gifford stated she feels like she's "not good at being an adult"  She said she's forgotten to set her alarm, she's missed trains, etc  She feels like her parents find something wrong with everything she does and they yell at her for it  Candace Gifford stated both of her parents have been hard on her; she's not sure if it's because she's the oldest or if they think she should be further along than she is; it seems to be both   Candace Gifford tries to assert herself, telling them she wants treated as an adult--she wants to be trusted and not "micromanaged"  They aren't like this with her younger sister, who she feels plays along with it  Umu Randolph shows evidence of utilizing distress tolerance skills skills to manage mental health symptoms  During this session, this clinical used the following therapeutic modalities engagement strategies, supportive psychotherapy and client-centered therapy  Clinician provided psychoeducation regarding use of assertive communication       Assessment:  Umu Randolph presents with a normal mood  her affect is normal range and intensity, appropriate  Umu Randolph was oriented x3  She was focused and engaged  Umu Randolph exhibits early engagement with this clinician  she continues to exhibit willingness to work on treatment goals and objectives  Umu Randolph presents with a minimal risk of suicide, minimal risk of self-harm and minimal of harm to others  Plan:  Umu Randolph will return in 2 weeks for the next scheduled session  Between sessions, she  will try to find a compromise with her parents and use assertive commmunication and will report back during the next session re: success and barriers  At the next session, this clinical will use engagement strategies, supportive psychotherapy, client-centered therapy and CBT techniques to address her depression, in effort to assist Umu Randolph with meeting treatment goals  Behavioral Health Treatment Plan [facility]: Diagnosis and Treatment Plan explained to Sylvia Gatica relates understanding diagnosis and is agreeable to Treatment Plan   Yes     Virtual Regular Visit    Verification of patient location:    Patient is located in the following state in which I hold an active license [place]      Assessment/Plan:    Problem List Items Addressed This Visit    None     Visit Diagnoses     Depression, unspecified depression type    -  Primary          Goals addressed in session: Goal 1          Reason for visit is   Chief Complaint   Patient presents with    Virtual Regular Visit        Encounter provider Davina Nguyễn LCSW    Provider located at [place] [address]  [address] [facility] [address] [address] [phone]      Recent Visits  No visits were found meeting these conditions  Showing recent visits within past 7 days and meeting all other requirements  Future Appointments  No visits were found meeting these conditions  Showing future appointments within next 150 days and meeting all other requirements       The patient was identified by name and date of birth  Edgard Velez was informed that this is a telemedicine visit and that the visit is being conducted through[employer] and patient was informed that this is a secure, HIPAA-compliant platform  She agrees to proceed     My office door was closed  No one else was in the room  She acknowledged consent and understanding of privacy and security of the video platform  The patient has agreed to participate and understands they can discontinue the visit at any time  Patient is aware this is a billable service  Subjective  Edgard Velez is a 25 y o  female        HPI     Past Medical History:   Diagnosis Date    Depression     History of migraine     Low vitamin D level     Uses birth control        Past Surgical History:   Procedure Laterality Date    ADENOIDECTOMY      TONSILLECTOMY AND ADENOIDECTOMY         Current Outpatient Medications   Medication Sig Dispense Refill    cholecalciferol (VITAMIN D3) 1,000 units tablet Take 1,000 Units by mouth daily      ferrous sulfate 325 (65 Fe) mg tablet Take 325 mg by mouth daily at bedtime      Fiber Complete TABS Take 2 tablets by mouth daily      FLUoxetine (PROzac) 20 MG tablet Take 1 tablet (20 mg total) by mouth daily 30 tablet 1    Magnesium 400 MG CAPS Take 400 mg by mouth daily      misoprostol (CYTOTEC) 200 mcg tablet Take 3 tablets (600 mcg total) by mouth once for 1 dose Take night before IUD insertion 3 tablet 0    ondansetron (Zofran ODT) 4 mg disintegrating tablet Take 1 tablet (4 mg total) by mouth every 6 (six) hours as needed for nausea or vomiting 20 tablet 0    topiramate (TOPAMAX) 25 mg tablet 2 tab po qhs 180 tablet 3     No current facility-administered medications for this visit  Allergies   Allergen Reactions    Augmentin [Amoxicillin-Pot Clavulanate] Diarrhea    Fish-Derived Products - Food Allergy Vomiting       Review of Systems    Video Exam    There were no vitals filed for this visit  Physical Exam     I spent 53 minutes directly with the patient during this visit    VIRTUAL VISIT [address] verbally agrees to participate in [employer]  Pt is aware that [employer] could be limited without vital signs or the ability to perform a full hands-on physical exam  Sunita Carranza understands she or the provider may request at any time to terminate the video visit and request the patient to seek care or treatment in person

## 2022-07-19 ENCOUNTER — TELEPHONE (OUTPATIENT)
Dept: NEUROLOGY | Facility: CLINIC | Age: 22
End: 2022-07-19

## 2022-07-21 ENCOUNTER — TELEMEDICINE (OUTPATIENT)
Dept: PSYCHIATRY | Facility: CLINIC | Age: 22
End: 2022-07-21
Payer: COMMERCIAL

## 2022-07-21 DIAGNOSIS — F32.A DEPRESSION, UNSPECIFIED DEPRESSION TYPE: Primary | ICD-10-CM

## 2022-07-21 PROCEDURE — 90834 PSYTX W PT 45 MINUTES: CPT

## 2022-07-21 NOTE — PSYCH
INDIVIDUAL SESSION NOTE     Length of time in session: 53 minutes, follow up in 2 weeks     Psychotherapy Provided: Individual      Diagnosis ICD-10-CM Associated Orders   1  Depression, unspecified depression type  F32  A           Goals addressed in session: Goal 1     Pain:      none    0    Current suicide risk:  minimal    Data: Met with Mindy Denney for a scheduled individual session  Topics discussed included emotional wellness, coping skills, family stressors and mood regulation and symptoms  Zuleyma is feeling "good"  She was in my chart waiting but realized this clinician had sent her a link for the session  Her family just came back from vacation  She felt that her family kept telling her she had an attitude; Zuleyma said the airport was a mess and no one was keeping track of their carry ons or knew where they were going, so she was frustrating  Her aunt took liquids in her carryon when [place] told her she couldn't or that they had to be small; her aunt complained the whole vacation about [employer] taking her things  Zuleyma later found out that her aunt was saying mean things behind her back, calling her a brat  While on vacation, Zuleyma wanted to relax versus going swimming; but her family kept telling her she had an attitude and her dad got mad at her  She feels like she can't win with her family  Zuleyma is still upset about her aunt talking meanly about her  Zuleyma shows evidence of utilizing distress tolerance skills and maintaining emotional composure to manage mental health symptoms  During this session, this clinical used the following therapeutic modalities supportive psychotherapy and client-centered therapy  Assessment:  Zuleyma presents with a normal mood  her affect is normal range and intensity, appropriate  Zuleyma was oriented x3  She was focused and engaged  Zuleyma exhibits growing therapeutic rapport with this clinician    she continues to exhibit willingness to work on treatment goals and objectives  Olga Hannah presents with a minimal risk of suicide, minimal risk of self-harm and minimal of harm to others  Plan:  Olga Hannah will return in 2 weeks for the next scheduled session  Between sessions, she  will try to effectively communicate with her family and let them know when she feels misunderstood and will report back during the next session re: success and barriers  At the next session, this clinical will use supportive psychotherapy and client-centered therapy to address her depression, in an effort to assist Olga Hannah with meeting treatment goals  Behavioral Health Treatment Plan [facility]: Diagnosis and Treatment Plan explained to Trevor Abrams relates understanding diagnosis and is agreeable to Treatment Plan  Yes     Virtual Regular Visit    Verification of patient location:    Patient is located in the following state in which I hold an active license [place]      Assessment/Plan:    Problem List Items Addressed This Visit    None     Visit Diagnoses     Depression, unspecified depression type    -  Primary          Goals addressed in session: Goal 1          Reason for visit is   Chief Complaint   Patient presents with    Virtual Regular Visit        Encounter provider Nadeem Dickey LCSW    Provider located at [address] [place] [id number] [phone]      Recent Visits  No visits were found meeting these conditions  Showing recent visits within past 7 days and meeting all other requirements  Future Appointments  No visits were found meeting these conditions  Showing future appointments within next 150 days and meeting all other requirements       The patient was identified by name and date of birth  Segundo Pault was informed that this is a telemedicine visit and that the visit is being conducted through[employer] and patient was informed that this is a secure, HIPAA-compliant platform   She agrees to proceed     My office door was closed  No one else was in the room  She acknowledged consent and understanding of privacy and security of the video platform  The patient has agreed to participate and understands they can discontinue the visit at any time  Patient is aware this is a billable service  Subjective  Lorna King is a 25 y o  female  HPI     Past Medical History:   Diagnosis Date    Depression     History of migraine     Low vitamin D level     Uses birth control        Past Surgical History:   Procedure Laterality Date    ADENOIDECTOMY      TONSILLECTOMY AND ADENOIDECTOMY         Current Outpatient Medications   Medication Sig Dispense Refill    cholecalciferol (VITAMIN D3) 1,000 units tablet Take 1,000 Units by mouth daily      ferrous sulfate 325 (65 Fe) mg tablet Take 325 mg by mouth daily at bedtime      Fiber Complete TABS Take 2 tablets by mouth daily      FLUoxetine (PROzac) 20 MG tablet Take 1 tablet (20 mg total) by mouth daily 30 tablet 1    Magnesium 400 MG CAPS Take 400 mg by mouth daily      misoprostol (CYTOTEC) 200 mcg tablet Take 3 tablets (600 mcg total) by mouth once for 1 dose Take night before IUD insertion 3 tablet 0    ondansetron (Zofran ODT) 4 mg disintegrating tablet Take 1 tablet (4 mg total) by mouth every 6 (six) hours as needed for nausea or vomiting 20 tablet 0    topiramate (TOPAMAX) 25 mg tablet 2 tab po qhs 180 tablet 3     No current facility-administered medications for this visit  Allergies   Allergen Reactions    Augmentin [Amoxicillin-Pot Clavulanate] Diarrhea    Fish-Derived Products - Food Allergy Vomiting       Review of Systems    Video Exam    There were no vitals filed for this visit  Physical Exam     I spent 53 minutes directly with the patient during this visit    VIRTUAL VISIT [address] verbally agrees to participate in [employer]   Pt is aware that [facility] could be limited without vital signs or the ability to perform a full hands-on physical exam  Sunita Carranza understands she or the provider may request at any time to terminate the video visit and request the patient to seek care or treatment in person

## 2022-07-27 ENCOUNTER — OFFICE VISIT (OUTPATIENT)
Dept: OBGYN CLINIC | Facility: CLINIC | Age: 22
End: 2022-07-27
Payer: COMMERCIAL

## 2022-07-27 VITALS
BODY MASS INDEX: 28.47 KG/M2 | SYSTOLIC BLOOD PRESSURE: 120 MMHG | DIASTOLIC BLOOD PRESSURE: 80 MMHG | WEIGHT: 181.4 LBS | HEIGHT: 67 IN

## 2022-07-27 DIAGNOSIS — Z30.431 IUD CHECK UP: Primary | ICD-10-CM

## 2022-07-27 PROCEDURE — 99213 OFFICE O/P EST LOW 20 MIN: CPT | Performed by: NURSE PRACTITIONER

## 2022-07-27 NOTE — PROGRESS NOTES
Subjective      Soledad Dsouza is a 25 y o  female who presents for IUD follow-up  A Mirena IUD was inserted on 22  The patient is sexually active  Pertinent past medical history: none  She reports small amount of cramping and spotting, but overall is satisfied with this contraception method  Menstrual History:    OB History        0    Para   0    Term   0       0    AB   0    Living   0       SAB   0    IAB   0    Ectopic   0    Multiple   0    Live Births   0                  Patient's last menstrual period was 2022 (exact date)  The following portions of the patient's history were reviewed and updated as appropriate: allergies, current medications, past family history, past medical history, past social history, past surgical history and problem list     Review of Systems  Pertinent items are noted in HPI  Objective      /80   Ht 5' 7" (1 702 m)   Wt 82 3 kg (181 lb 6 4 oz)   LMP 2022 (Exact Date)   BMI 28 41 kg/m²     Physical Exam  Constitutional:       Appearance: Normal appearance  Genitourinary:      Genitourinary Comments: IUD noted to be in proper position with bedside transabdominal US  Right Labia: No rash, tenderness, lesions, skin changes or Bartholin's cyst      Left Labia: No tenderness, lesions, skin changes, Bartholin's cyst or rash  No labial fusion noted  No vaginal discharge, erythema or bleeding  No vaginal prolapse present  No vaginal atrophy present  No cervical discharge, friability, lesion, polyp or nabothian cyst       IUD strings visualized  HENT:      Head: Normocephalic and atraumatic  Musculoskeletal:      Cervical back: Neck supple  Neurological:      Mental Status: She is alert  Skin:     General: Skin is warm  Vitals and nursing note reviewed  Assessment and Plan     25 y o , continuing IUD, no contraindications             follow up for annual visit in August

## 2022-08-02 ENCOUNTER — OFFICE VISIT (OUTPATIENT)
Dept: NEUROLOGY | Facility: CLINIC | Age: 22
End: 2022-08-02
Payer: COMMERCIAL

## 2022-08-02 VITALS
WEIGHT: 183.6 LBS | RESPIRATION RATE: 18 BRPM | DIASTOLIC BLOOD PRESSURE: 80 MMHG | SYSTOLIC BLOOD PRESSURE: 136 MMHG | HEIGHT: 67 IN | HEART RATE: 85 BPM | BODY MASS INDEX: 28.82 KG/M2 | TEMPERATURE: 98.6 F

## 2022-08-02 DIAGNOSIS — M25.50 ARTHRALGIA, UNSPECIFIED JOINT: ICD-10-CM

## 2022-08-02 DIAGNOSIS — G44.86 CERVICOGENIC HEADACHE: ICD-10-CM

## 2022-08-02 DIAGNOSIS — H53.2 DIPLOPIA: ICD-10-CM

## 2022-08-02 DIAGNOSIS — G43.009 MIGRAINE WITHOUT AURA AND WITHOUT STATUS MIGRAINOSUS, NOT INTRACTABLE: ICD-10-CM

## 2022-08-02 DIAGNOSIS — R25.1 TREMOR: ICD-10-CM

## 2022-08-02 DIAGNOSIS — R29.6 FALLS FREQUENTLY: ICD-10-CM

## 2022-08-02 DIAGNOSIS — M62.81 MUSCLE WEAKNESS: ICD-10-CM

## 2022-08-02 DIAGNOSIS — E23.7 PITUITARY ABNORMALITY (HCC): Primary | ICD-10-CM

## 2022-08-02 PROCEDURE — 99214 OFFICE O/P EST MOD 30 MIN: CPT | Performed by: PHYSICIAN ASSISTANT

## 2022-08-02 RX ORDER — RIZATRIPTAN BENZOATE 10 MG/1
10 TABLET, ORALLY DISINTEGRATING ORAL AS NEEDED
Qty: 18 TABLET | Refills: 0 | Status: SHIPPED | OUTPATIENT
Start: 2022-08-02

## 2022-08-02 RX ORDER — BACLOFEN 5 MG/1
TABLET ORAL
Qty: 30 TABLET | Refills: 5 | Status: SHIPPED | OUTPATIENT
Start: 2022-08-02

## 2022-08-02 NOTE — PATIENT INSTRUCTIONS
Will order labs, EMG and MRI thoracic spine  Referral to ophthalmology    For headaches, start baclofen 5mg at bedtime  Continue Topamax 50mg daily  Can use Maxalt as needed for migraine  Continue magnesium supplement  Can also try B2 (riboflavin) 400mg daily and coQ10 100mg twice a day    Return in 6 weeks or sooner if needed  If any new/acute symptoms, go to the ER

## 2022-08-02 NOTE — PROGRESS NOTES
Patient ID: Costella Meckel is a 25 y o  female  Assessment:  Angeline Farrell is a 25year old female who has been followed here for migraine headahces  She was also found on MRI brain done for migraine workup to have pituitary hyperplasia and has followed with endo  No hormonal abnormalities and imaging has been stable  Migraines are stable on Topamax 50mg HS for prevention and Maxalt 10mg taken at onset of migraine  Since the beginning of 2022 she has had many new symptoms which she is concerned about including weakness in the legs/legs giving out causing her to fall and not be able to climb steps, tremor in the R hand (described as intention tremor), cramping of the hands, neck pain and posterior headaches, GI upset relieved with vomiting, intermittent diplopia, and a few episodes of urinary incontinence  She had an updated MRI brain/pituitary in March 2022 (after symptoms started) which shows normal brain parenchyma with stable pituitary enlargement, favored to be physiologic hyperplasia  MRA head 6/28/22 was normal   MRI c-spine 6/28/22 with normal cord signal  Small central disc protrusion at level C5-C6 resulting in mild canal narrowing  No foraminal stenosis  Her neurologic exam is normal except for very slight weakness of the right hip flexor 5-/5, question effort as well  There was no tremor on exam, as she describes seeing at home  I am not sure what is causing all of these symptoms, especially with a normal neurologic exam   Discussed I will order labs for myasthenia gravis, rheumatologic conditions, Lyme, vitamin levels  Will order EMG of the RUE and RLE (she feels more symptomatic on the right)  There is no evidence of MS in her brain or c-spine, but due to falls and LE weakness, as well as incontinence, will order MRI t-spine to complete CNS imaging  I am going to refer her to ophthalmology  Also recommend GI eval for gastro distress      For cervicogenic headaches, can try baclofen 5mg at bedtime  Will refer to PT to work on balance and LE strengthening while workup is ongoing  Discussed ED evaluation if any new/acute neurologic symptoms for expedited workup  Plan:  -MRI thoracic spine  -labs  -EMG RUE/RLE  -physical therapy for balance/strengthening  -referral to ophthalmology    Headaches-  -continue Topamax 50mg HS for migraine prevention  -continue Maxalt 10mg at onset of migraine PRN  -start baclofen 5mg HS for cervicogenic headache    Return after studies are completed  Diagnoses and all orders for this visit:    Pituitary abnormality ([facility] [address] )    Migraine without aura and without status migrainosus, not intractable  -     rizatriptan (Maxalt-MLT) 10 mg disintegrating tablet; Take 1 tablet (10 mg total) by mouth as needed for migraine Take at the onset of migraine; if symptoms continue or return, may take another dose at least 2 hours after first dose  Take no more than 2 doses in a day  Cervicogenic headache  -     Baclofen 5 MG TABS; Take 1 po HS    Muscle weakness  -     Acetylcholine receptor, binding; Future  -     Acetylcholine receptor, blocking; Future  -     MUSK Antibody; Future  -     Vitamin B12; Future  -     Vitamin D 25 hydroxy; Future  -     EMG 1 Upper/1 Lower Neuropathy; Future  -     MRI thoracic spine with and without contrast; Future  -     Ambulatory Referral to Physical Therapy; Future    Arthralgia, unspecified joint  -     Sjogren's Antibodies; Future  -     EDUARDO w/Reflex if Positive; Future  -     RF Screen w/ Reflex to Titer; Future  -     Sedimentation rate, automated; Future  -     Magnesium; Future    Tremor  -     Copper Level; Future    Diplopia  -     Ambulatory Referral to Ophthalmology; Future    Falls frequently  -     Ambulatory Referral to Physical Therapy; Future           Subjective:    JESSICA Daley is a 25 y o  female who presents today for neurologic follow up      To review, patient initially presented to the neurology clinic in April 2020 for evaluation of headaches dating back to age 15  There is a family history of migraines in her mother  She had been using Maxalt for abortive therapy Rx'd by PCP, which had been working well for her  She was tried on Topamax for migraine prevention  She had been doing well at 50mg HS, but went she tried to increase to 75mg HS, she had side effects, therefore Topamax reduced back to 50mg HS  As part of her workup, MRI brain was ordered  MRI brain wo contrast 6/2/2020  Brain normal, although there was borderline enlargement of the pituitary gland with a convex superior margin  This may represent pituitary hyperplasia, particularly if the patient is on oral contraceptives (she was)  MRI brain pituitary w and wo contrast completed 6/10/2020  This demonstrated mild diffuse enlargement of the pituitary gland with homogeneous enhancement  Differential considerations include pituitary hyperplasia versus macroadenoma  She was referred to endocrinology, no hormonal abnormalities  This was favored to be physiologic enlargement of the pituitary  Patient last seen in October 2021  At that time, she was doing well, continued on Topamax 50mg HS for migraine prevention and Maxalt for abortive therapy  More recently, patient contacted our office via [id number] message in May 2022  In the message, she reported developing some concerning symptoms including GI distress (pain which was relieved with vomiting), headaches that were different than her migraines, shaking in the hands, back pain and neck pain, muscle weakness  Patient reported she went to PT ordered by her PCP  The patient had an updated MRI brain/pituitary w and wo contrast on 3/4/22 which was stable--normal brain imaging and stable enlargement of the pituitary, 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  While waiting for an appt, Dr Jovon North ordered some updated studies  MRA head completed 6/28/22 was unremarkable  MRI c-spine 6/28/22 with normal cord signal   Small central disc protrusion at level C5-C6 resulting in mild canal narrowing  No foraminal stenosis  Today, patient presents with her mother for follow up  They are both very concerned regarding Sunita's symptoms today  Kylie Castañeda reports symptoms started at the beginning of 2022  She recently graduated from [employer]  While attending school there at the beginning of the year, she started having difficulty ambulating, especially on the hilly campus  She reports she has numerous falls, occurring a few times a week  She had trouble going up her stairs and actually had to move out of her 3rd floor apartment because it was too much to climb the stairs  She reports feeling like her legs were getting weak and giving out  Right side seems worse than left  She was having some knee pain, too  Her PCP referred her to PT, and she felt this got a little better, but has now been back to the same  She takes the train into [facility] for work a few times a week and frequently falls getting onto the train  She is able to get herself back up  She denies any dizziness or vertigo  She also reports neck pain and headaches stemming from her neck  Headaches are located in the back of the head  She tells me she has noticed intermittent double vision, usually with mid-distance such as watching tv  She feels like she has to look down out of the bottom of her vision in order for this to improve  She has not seen an eye doctor about this  She notes her hands shake, mostly her right, when she is doing something like holding a glass, handing something to someone  Her mother has noticed this  Her hands cramp when she is writing or typing for over 10 min at a time  She denies weakness in the hands or arms  Her right wrist does hurt her  She has been wearing a right wrist brace and a right knee brace  When asked, she notes she has had some urinary incontinence a few times    She denies any muscle twitching or fasciculations  She denies any significant SOB, dysphagia  She has never presented to the ED for any of these symptoms  Mom is questioning if this could be MS  There is no known family history of MS or other neuromuscular disease  She denies any specific inciting event prior to symptom onset  She does report she went through a stressful situation in April, but symptoms had already started  She is currently following with psych  The following portions of the patient's history were reviewed and updated as appropriate: current medications, past family history, past medical history, past social history, past surgical history and problem list          Objective:    Blood pressure 136/80, pulse 85, temperature 98 6 °F (37 °C), temperature source Tympanic, resp  rate 18, height 5' 7" (1 702 m), weight 83 3 kg (183 lb 9 6 oz), last menstrual period 07/17/2022  Physical Exam  Constitutional:       Appearance: Normal appearance  HENT:      Head: Normocephalic and atraumatic  Eyes:      Extraocular Movements: EOM normal       Pupils: Pupils are equal, round, and reactive to light  Neurological:      Mental Status: She is alert  Coordination: Coordination is intact  Deep Tendon Reflexes: Reflexes are normal and symmetric  Psychiatric:         Mood and Affect: Mood normal          Speech: Speech normal          Behavior: Behavior normal          Neurological Exam  Mental Status  Alert  Oriented to person, place, time and situation  Speech is normal  Language is fluent with no aphasia  Attention and concentration are normal     Cranial Nerves  CN II: Visual fields full to confrontation  CN III, IV, VI: Extraocular movements intact bilaterally  Pupils equal round and reactive to light bilaterally  CN V: Facial sensation is normal   CN VII: Full and symmetric facial movement    CN VIII: Hearing is normal   CN IX, X: Palate elevates symmetrically  CN XI: Shoulder shrug strength is normal   CN XII: Tongue midline without atrophy or fasciculations  Motor  Normal muscle bulk throughout  No fasciculations present  Normal muscle tone  No abnormal involuntary movements  Strength is 5/5 in all four extremities except as noted  Right HF 5-/5  Sensory  Light touch is normal in upper and lower extremities  Temperature is normal in upper and lower extremities  Vibration is normal in upper and lower extremities  Reflexes  Deep tendon reflexes are 2+ and symmetric in all four extremities  Coordination    Finger-to-nose, rapid alternating movements and heel-to-shin normal bilaterally without dysmetria  Gait  Casual gait is normal including stance, stride, and arm swing  Normal tandem gait  ROS:    Review of Systems   Constitutional: Positive for fatigue  Negative for chills and fever  HENT: Negative for ear pain and sore throat  Eyes: Positive for pain (bilateral eyes ) and visual disturbance (bilateral eyes - double vision and shakey eyes)  Respiratory: Negative for cough and shortness of breath  Cardiovascular: Negative for chest pain and palpitations  Gastrointestinal: Negative for abdominal pain and vomiting  Genitourinary: Positive for urgency  Negative for dysuria and hematuria  Musculoskeletal: Positive for gait problem, neck pain and neck stiffness  Negative for arthralgias and back pain  Skin: Negative for color change and rash  Neurological: Positive for tremors (r hand), weakness (bilateral legs and weakness) and headaches (3 or 4 week pain scale of 6)  Negative for seizures and syncope  All other systems reviewed and are negative      I personally reviewed and updated the ROS as appropriate

## 2022-08-03 ENCOUNTER — TELEMEDICINE (OUTPATIENT)
Dept: PSYCHIATRY | Facility: CLINIC | Age: 22
End: 2022-08-03
Payer: COMMERCIAL

## 2022-08-03 DIAGNOSIS — F32.A DEPRESSION, UNSPECIFIED DEPRESSION TYPE: Primary | ICD-10-CM

## 2022-08-03 PROCEDURE — 90834 PSYTX W PT 45 MINUTES: CPT

## 2022-08-03 NOTE — PSYCH
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 but she's not been able to attend all of the days  She's had an increase in her falls; she said since March it's really increased to a few times a week  Yesterday she had a neurology appointment and will be getting testing done  Nilsa Wong states her parents get frustrated when she falls because they initially didn't believe there was anything wrong  Often times they thought she was being lazy  Now that it is becoming more frequent and Nilsa Wong is also complaining of leg pain and headaches, her parents have come around a bit  Nilsa Wong spoke to her mother about the summer camp and not wanting to have signed up in the first place; she did it at the urging of her mother  Nilsa Wong stated her mother finally acknowledged Sunita's feelings and ended up going to [facility] neurology appointment with her  Nilsa Wong became upset because she doesn't know what is going on with her and wants to be able to do "normal" things  She feels like those around her "" her because she's young, as thought she "should" be able to get around with no issue  She struggles with this herself, because she feels at 24 yo, she shouldn't be struggling with mobility  Sunita's father is a source of stress for her because she feels he treats her health issues as an inconvenience to him   We talked about open communication and letting others know what you need from them, while acknowledging their efforts when they try to help  Nilsa Wong shows evidence of utilizing maintaining emotional composure to manage mental health symptoms  During this session, this clinical used the following therapeutic modalities supportive psychotherapy, client-centered therapy and problem solving skills  Assessment:  Nilsa Wong presents with a dysphoric mood  her affect is tearful  Nilsa Wong was oriented x3  She was focused and engaged  Nilsa Wong exhibits growing therapeutic rapport with this clinician  she continues to exhibit willingness to work on treatment goals and objectives  Nilsa Wong presents with a minimal risk of suicide, minimal risk of self-harm and minimal of harm to others  Plan:  Nilsa Wong will return in 2 weeks for the next scheduled session  Between sessions, she  will continue to attend her neurology appointments and testing, as well as work to use open communication with those in her life so they know how to support her and will report back during the next session re: success and barriers  At the next session, this clinical will use supportive psychotherapy and client-centered therapy to address her depressive symptoms, in an effort to assist Nilsa Wong with meeting treatment goals  Behavioral Health Treatment Plan [facility]: Diagnosis and Treatment Plan explained to Shellie Janna relates understanding diagnosis and is agreeable to Treatment Plan   Yes     Virtual Regular Visit    Verification of patient location:    Patient is located in the following state in which I hold an active license [place]      Assessment/Plan:    Problem List Items Addressed This Visit    None     Visit Diagnoses     Depression, unspecified depression type    -  Primary          Goals addressed in session: Goal 1          Reason for visit is   Chief Complaint   Patient presents with    Virtual Regular Visit        Encounter provider Eugene Frye LCSW    Provider located at [address] [place] [id number] [phone]      Recent Visits  No visits were found meeting these conditions  Showing recent visits within past 7 days and meeting all other requirements  Future Appointments  No visits were found meeting these conditions  Showing future appointments within next 150 days and meeting all other requirements       The patient was identified by name and date of birth  Tanner Schumacher was informed that this is a telemedicine visit and [facility] and [facility] was informed that this is a secure, HIPAA-compliant platform  She agrees to proceed     My office door was closed  No one else was in the room  She acknowledged consent and understanding of privacy and security of the video platform  The patient has agreed to participate and understands they can discontinue the visit at any time  Patient is aware this is a billable service  Subjective  Tanner Schumacher is a 25 y o  female        HPI     Past Medical History:   Diagnosis Date    Depression     History of migraine     Low vitamin D level     Uses birth control        Past Surgical History:   Procedure Laterality Date    ADENOIDECTOMY      TONSILLECTOMY AND ADENOIDECTOMY         Current Outpatient Medications   Medication Sig Dispense Refill    Baclofen 5 MG TABS Take 1 po HS 30 tablet 5    cholecalciferol (VITAMIN D3) 1,000 units tablet Take 1,000 Units by mouth daily      ferrous sulfate 325 (65 Fe) mg tablet Take 325 mg by mouth daily at bedtime      Fiber Complete TABS Take 2 tablets by mouth daily      FLUoxetine (PROzac) 20 MG tablet Take 1 tablet (20 mg total) by mouth daily (Patient not taking: Reported on 8/2/2022) 30 tablet 1    Levonorgestrel (MIRENA) 20 MCG/DAY IUD 1 each by Intrauterine route once      Magnesium 400 MG CAPS Take 400 mg by mouth daily      misoprostol (CYTOTEC) 200 mcg tablet Take 3 tablets (600 mcg total) by mouth once for 1 dose Take night before IUD insertion 3 tablet 0    ondansetron (Zofran ODT) 4 mg disintegrating tablet Take 1 tablet (4 mg total) by mouth every 6 (six) hours as needed for nausea or vomiting 20 tablet 0    rizatriptan (Maxalt-MLT) 10 mg disintegrating tablet Take 1 tablet (10 mg total) by mouth as needed for migraine Take at the onset of migraine; if symptoms continue or return, may take another dose at least 2 hours after first dose  Take no more than 2 doses in a day  18 tablet 0    topiramate (TOPAMAX) 25 mg tablet 2 tab po qhs 180 tablet 3     No current facility-administered medications for this visit  Allergies   Allergen Reactions    Augmentin [Amoxicillin-Pot Clavulanate] Diarrhea    Fish-Derived Products - Food Allergy Vomiting       Review of Systems    Video Exam    There were no vitals filed for this visit  Physical Exam     I spent 53 minutes directly with the patient during this visit    VIRTUAL VISIT [id number] verbally agrees to participate in [employer]  Pt is aware that [employer] could be limited without vital signs or the ability to perform a full hands-on physical exam  Sunita Carranza understands she or the provider may request at any time to terminate the video visit and request the patient to seek care or treatment in person

## 2022-08-05 ENCOUNTER — TELEPHONE (OUTPATIENT)
Dept: FAMILY MEDICINE CLINIC | Facility: CLINIC | Age: 22
End: 2022-08-05

## 2022-08-05 NOTE — TELEPHONE ENCOUNTER
Called patient and discussed symptoms  Started having symptoms yesterday  Recommend conservative therapy as patient does not have any significant risk factors that would require paxlovid other than her bmi   Recommend calling back on Monday if symptoms are worse, will start at paxlovid at that time if needed

## 2022-08-05 NOTE — TELEPHONE ENCOUNTER
Patient called to report that she has tested positive for Covid with a home test  Patient says she is currently  getting a work-up done by neurology to see if she has an autoimmune disorder  Patient want to know if she should have the Paxlovid medication  Patient's current symptoms include: Sore throat headache, and back pain

## 2022-08-16 ENCOUNTER — ANNUAL EXAM (OUTPATIENT)
Dept: OBGYN CLINIC | Facility: CLINIC | Age: 22
End: 2022-08-16
Payer: COMMERCIAL

## 2022-08-16 VITALS
BODY MASS INDEX: 28.56 KG/M2 | WEIGHT: 182 LBS | SYSTOLIC BLOOD PRESSURE: 122 MMHG | DIASTOLIC BLOOD PRESSURE: 80 MMHG | HEIGHT: 67 IN

## 2022-08-16 DIAGNOSIS — Z01.419 WOMEN'S ANNUAL ROUTINE GYNECOLOGICAL EXAMINATION: Primary | ICD-10-CM

## 2022-08-16 PROCEDURE — S0612 ANNUAL GYNECOLOGICAL EXAMINA: HCPCS | Performed by: NURSE PRACTITIONER

## 2022-08-16 NOTE — PROGRESS NOTES
Subjective    HPI:     Carisa Lazo is a 25 y o  nulliparous female  Her menstrual cycles are monthly  She is still having brown spotting with the Mirena which was inserted   Her current method of contraception includes Mirena IUD  She denies issues with intimacy  She denies /GI and Gyn complaints  She feels safe at home  She denies depression/anxiety  Medical, surgical and family history reviewed  Her dental care is up-to-date  She eats a healthy diet and exercises regularly  She is happy with her weight  Gynecologic History    Patient's last menstrual period was 2022 (exact date)  Gardasil Vaccine Series: completed  Last Pap: 21  Results were: normal    Obstetric History    OB History    Para Term  AB Living   0 0 0 0 0 0   SAB IAB Ectopic Multiple Live Births   0 0 0 0 0       The following portions of the patient's history were reviewed and updated as appropriate: allergies, current medications, past family history, past medical history, past social history, past surgical history and problem list     Review of Systems    Pertinent items are noted in HPI  Objective    Physical Exam  Constitutional:       Appearance: Normal appearance  She is well-developed  Genitourinary:      Vulva, bladder and urethral meatus normal       No lesions in the vagina  Right Labia: No rash, tenderness, lesions, skin changes or Bartholin's cyst      Left Labia: No tenderness, lesions, skin changes, Bartholin's cyst or rash  No labial fusion noted  No inguinal adenopathy present in the right or left side  No vaginal discharge, erythema, tenderness, bleeding or granulation tissue  No vaginal prolapse present  No vaginal atrophy present  Right Adnexa: not tender, not full and no mass present  Left Adnexa: not tender, not full and no mass present  Cervix is nulliparous        No cervical motion tenderness, discharge, friability, lesion, polyp or nabothian cyst       IUD strings visualized  Uterus is not enlarged, tender, irregular or prolapsed  No uterine mass detected  Uterus is anteverted  Pelvic exam was performed with patient in the lithotomy position  Breasts: Breasts are symmetrical       Right: No inverted nipple, mass, nipple discharge, skin change, tenderness, axillary adenopathy or supraclavicular adenopathy  Left: No inverted nipple, mass, nipple discharge, skin change, tenderness, axillary adenopathy or supraclavicular adenopathy  HENT:      Head: Normocephalic and atraumatic  Neck:      Thyroid: No thyromegaly  Cardiovascular:      Rate and Rhythm: Normal rate and regular rhythm  Heart sounds: Normal heart sounds, S1 normal and S2 normal    Pulmonary:      Effort: Pulmonary effort is normal       Breath sounds: Normal breath sounds  Abdominal:      General: Bowel sounds are normal  There is no distension  Palpations: Abdomen is soft  There is no mass  Tenderness: There is no abdominal tenderness  There is no guarding  Hernia: There is no hernia in the left inguinal area or right inguinal area  Musculoskeletal:      Cervical back: Neck supple  Lymphadenopathy:      Cervical: No cervical adenopathy  Upper Body:      Right upper body: No supraclavicular or axillary adenopathy  Left upper body: No supraclavicular or axillary adenopathy  Lower Body: No right inguinal adenopathy  No left inguinal adenopathy  Neurological:      Mental Status: She is alert  Skin:     General: Skin is warm and dry  Findings: No rash  Psychiatric:         Attention and Perception: Attention and perception normal          Mood and Affect: Mood and affect normal          Speech: Speech normal          Behavior: Behavior is cooperative  Thought Content:  Thought content normal          Cognition and Memory: Cognition and memory normal          Judgment: Judgment normal    Vitals and nursing note reviewed  Assessment and Plan    Manav Cano was seen today for gynecologic exam     Diagnoses and all orders for this visit:    Women's annual routine gynecological examination      Patient informed of a Stable GYN exam  A pap smear was not performed due to a normal pap in 2021  I have discussed the importance of exercise and healthy diet as well as adequate intake of calcium and vitamin D  The current ASCCP guidelines were reviewed  The low risk patient will receive pap smear screening every 3 years until the age of 34 and then every 3 to 5 years with HPV co-testing from the ages of 33-67  I emphasized the importance of an annual pelvic and breast exam  A yearly mammogram is recommended for breast cancer screening starting at age 36  I explained as each cycles occurs she should see improvement with the spotting  Results will be released to [facility], if abnormal will call to review and discuss treatment plan  All questions have been answered to her satisfaction  Contraception: IUD  Follow up in: 1 year or sooner if needed

## 2022-08-17 ENCOUNTER — TELEMEDICINE (OUTPATIENT)
Dept: PSYCHIATRY | Facility: CLINIC | Age: 22
End: 2022-08-17
Payer: COMMERCIAL

## 2022-08-17 ENCOUNTER — APPOINTMENT (OUTPATIENT)
Dept: LAB | Facility: HOSPITAL | Age: 22
End: 2022-08-17
Payer: COMMERCIAL

## 2022-08-17 DIAGNOSIS — M62.81 MUSCLE WEAKNESS: ICD-10-CM

## 2022-08-17 DIAGNOSIS — F32.A DEPRESSION, UNSPECIFIED DEPRESSION TYPE: Primary | ICD-10-CM

## 2022-08-17 DIAGNOSIS — M25.50 ARTHRALGIA, UNSPECIFIED JOINT: ICD-10-CM

## 2022-08-17 DIAGNOSIS — R25.1 TREMOR: ICD-10-CM

## 2022-08-17 LAB
25(OH)D3 SERPL-MCNC: 15.5 NG/ML (ref 30–100)
ERYTHROCYTE [SEDIMENTATION RATE] IN BLOOD: 13 MM/HOUR (ref 0–19)
MAGNESIUM SERPL-MCNC: 2.2 MG/DL (ref 1.6–2.6)
VIT B12 SERPL-MCNC: 516 PG/ML (ref 100–900)

## 2022-08-17 PROCEDURE — 90834 PSYTX W PT 45 MINUTES: CPT

## 2022-08-17 PROCEDURE — 36415 COLL VENOUS BLD VENIPUNCTURE: CPT

## 2022-08-17 PROCEDURE — 86430 RHEUMATOID FACTOR TEST QUAL: CPT

## 2022-08-17 PROCEDURE — 86038 ANTINUCLEAR ANTIBODIES: CPT

## 2022-08-17 PROCEDURE — 82306 VITAMIN D 25 HYDROXY: CPT

## 2022-08-17 PROCEDURE — 83519 RIA NONANTIBODY: CPT

## 2022-08-17 PROCEDURE — 85652 RBC SED RATE AUTOMATED: CPT

## 2022-08-17 PROCEDURE — 82607 VITAMIN B-12: CPT

## 2022-08-17 PROCEDURE — 82525 ASSAY OF COPPER: CPT

## 2022-08-17 PROCEDURE — 83735 ASSAY OF MAGNESIUM: CPT

## 2022-08-17 PROCEDURE — 86235 NUCLEAR ANTIGEN ANTIBODY: CPT

## 2022-08-17 NOTE — PSYCH
INDIVIDUAL SESSION NOTE     Length of time in session: 53 minutes, follow up in 2 weeks     Psychotherapy Provided: Individual      Diagnosis ICD-10-CM Associated Orders   1  Depression, unspecified depression type  F32  A           Goals addressed in session: Goal 1     Pain:      none    0    Current suicide risk:  minimal    Data: Met with Shira Gonzalez for a scheduled individual session  Topics discussed included emotional wellness, coping skills, family stressors, physical health concerns, mood regulation and symptoms and life goals and values  Cresenciano Bowels is feeling "upset"  Cresenciano Bowels became tearful at the onset of the session; she stated she and her mother got into an argument  Cresenciano Bowels was ordered bloodwork 2 weeks ago but is just now getting it done because she got covid and was sick  Her mother yelled at her and said that no doctor will take Cresenciano Bowels seriously because she waited 2 weeks to get her blood work  Even though she new Cresenciano Bowels had Covid  Cresenciano Bowels expressed a lot of what her mother as well as father say to her and the theme is strongly dismissive, minimizing and invalidating  Cresenciano Bowels and I also discussed what savanah looks like  She has really good insight and articulates herself well; she's doing her part to try and stick up for herself and get her parents to understand where she's coming from  We processed a lot of her feelings  Cresenciano Bowels shows evidence of utilizing effective communication skills to manage mental health symptoms  During this session, this clinical used the following therapeutic modalities supportive psychotherapy and client-centered therapy  Assessment:  Cresenciano Bowels presents with a dysphoric mood  her affect is tearful  Cresenciano Bowels was oriented x3  She was focused and engaged  Cresenciano Bowels exhibits good therapeutic rapport with this clinician  she continues to exhibit willingness to work on treatment goals and objectives    Cresenciano Bowels presents with a minimal risk of suicide, minimal risk of self-harm and minimal of harm to others  Plan:  [facility] will return in 2 weeks for the next scheduled session  Between sessions, she  will focus on her wellbeing, 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 and will report back during the next session re: success and barriers  At the next session, this clinical will use supportive psychotherapy and client-centered therapy to address her depression, in an effort to assist [facility] with meeting treatment goals  Behavioral Health Treatment Plan [facility]: Diagnosis and Treatment Plan explained to Alberto Pat relates understanding diagnosis and is agreeable to Treatment Plan  Yes       Virtual Regular Visit    Verification of patient location:    Patient is located in the following state in which I hold an active license [place]      Assessment/Plan:    Problem List Items Addressed This Visit    None     Visit Diagnoses     Depression, unspecified depression type    -  Primary          Goals addressed in session: Goal 1          Reason for visit is   Chief Complaint   Patient presents with    Virtual Regular Visit        Encounter provider Naye Ibarra LCSW    Provider located at [address] [place] [id number] [phone]      Recent Visits  No visits were found meeting these conditions  Showing recent visits within past 7 days and meeting all other requirements  Today's Visits  Date Type Provider Dept   08/17/22 Telemedicine Naye Ibarra LCSW [facility]   Showing today's visits and meeting all other requirements  Future Appointments  No visits were found meeting these conditions  Showing future appointments within next 150 days and meeting all other requirements       The patient was identified by name and date of birth   Tony Mooring was informed that this is a telemedicine visit and [facility] the [facility] and [facility] was informed this is a secure, HIPAA-complaint platform  She agrees to proceed     My office door was closed  No one else was in the room  She acknowledged consent and understanding of privacy and security of the video platform  The patient has agreed to participate and understands they can discontinue the visit at any time  Patient is aware this is a billable service  Subjective  Lucila Ramirez is a 25 y o  female  HPI     Past Medical History:   Diagnosis Date    Depression     History of migraine     Low vitamin D level     Uses birth control        Past Surgical History:   Procedure Laterality Date    ADENOIDECTOMY      TONSILLECTOMY AND ADENOIDECTOMY         Current Outpatient Medications   Medication Sig Dispense Refill    Baclofen 5 MG TABS Take 1 po HS 30 tablet 5    cholecalciferol (VITAMIN D3) 1,000 units tablet Take 1,000 Units by mouth daily      ferrous sulfate 325 (65 Fe) mg tablet Take 325 mg by mouth daily at bedtime      Fiber Complete TABS Take 2 tablets by mouth daily      FLUoxetine (PROzac) 20 MG tablet Take 1 tablet (20 mg total) by mouth daily 30 tablet 1    Levonorgestrel (MIRENA) 20 MCG/DAY IUD 1 each by Intrauterine route once      Magnesium 400 MG CAPS Take 400 mg by mouth daily      ondansetron (Zofran ODT) 4 mg disintegrating tablet Take 1 tablet (4 mg total) by mouth every 6 (six) hours as needed for nausea or vomiting 20 tablet 0    rizatriptan (Maxalt-MLT) 10 mg disintegrating tablet Take 1 tablet (10 mg total) by mouth as needed for migraine Take at the onset of migraine; if symptoms continue or return, may take another dose at least 2 hours after first dose  Take no more than 2 doses in a day  18 tablet 0    topiramate (TOPAMAX) 25 mg tablet 2 tab po qhs 180 tablet 3     No current facility-administered medications for this visit          Allergies   Allergen Reactions    Augmentin [Amoxicillin-Pot Clavulanate] Diarrhea    Fish-Derived Products - Food Allergy Vomiting       Review of Systems    Video Exam    There were no vitals filed for this visit      Physical Exam     I spent 53 minutes directly with the patient during this visit

## 2022-08-18 LAB
ENA SS-A AB SER-ACNC: <0.2 AI (ref 0–0.9)
ENA SS-B AB SER-ACNC: <0.2 AI (ref 0–0.9)
RHEUMATOID FACT SER QL LA: NEGATIVE

## 2022-08-19 LAB — RYE IGE QN: NEGATIVE

## 2022-08-20 LAB — COPPER SERPL-MCNC: 77 UG/DL (ref 80–158)

## 2022-08-22 ENCOUNTER — EVALUATION (OUTPATIENT)
Dept: PHYSICAL THERAPY | Facility: CLINIC | Age: 22
End: 2022-08-22
Payer: COMMERCIAL

## 2022-08-22 DIAGNOSIS — R29.6 FALLS FREQUENTLY: ICD-10-CM

## 2022-08-22 DIAGNOSIS — M62.81 MUSCLE WEAKNESS: ICD-10-CM

## 2022-08-22 PROCEDURE — 97163 PT EVAL HIGH COMPLEX 45 MIN: CPT

## 2022-08-22 NOTE — PROGRESS NOTES
PT Evaluation     Today's date: 2022  Patient name: Segundo Ortega  : 2000  MRN: [id number]  Referring provider: Kelsea Root PA-C  Dx:   Encounter Diagnosis     ICD-10-CM    1  Muscle weakness  M62 81 Ambulatory Referral to Physical Therapy   2  Falls frequently  R29 6 Ambulatory Referral to Physical Therapy       Start Time: 1625  Stop Time: 1725  Total time in clinic (min): 60 minutes    Assessment  Assessment details: Patient is a 25y o  year old female presenting to OPPT s/p diagnosis of muscular weakness and frequent falls  Olga Hannah is getting further medical work up regarding her falls, having completed CNS imaging done next month with thoracic MRI  Patient demonstrates decreased strength, endurance, balance, and functional independence  BLE shows reduced overall strength and difficulty with moving against gravity while supine/sidelying  Shows reduced endurance compared to age-matched norms on 6MWT  FGA is , which indicates she is at risk for falls  Patient working less at this time and requires assistance for ADLs and IADLs  She will benefit from skilled PT interventions to maximize return to PLOF and independence as able  Discussed performing daily exercises within her energy envelope to get stronger, however, to ensure she has energy to negotiate stairs at the end of the day  Thank you for this referral and the ability to participate in the care of this patient  Impairments: abnormal gait, activity intolerance, impaired balance, impaired physical strength, lacks appropriate home exercise program and safety issue     Prognosis: good    Goals  In 4 weeks, patient will:  1  Report improved confidence with negotiating curbs and flat room environment  2  Demonstrate less muscular fatigue during her travel to work to assist with QoL  3  Improve 5xSTS by at least 2 seconds to show improved LE strength and stability to assist with transfers  In 4-10 weeks, patient will:  1    Meet FOTO predicted score to demonstrate improvement in functional mobility  2   Demonstrate consistent carryover with HEP and walking program   3   Improve BLE strength by at least 1 point in each muscle group  4  Improve 6MWT by at least 100ft to demonstrate improvement in ability in community ambulation  5   Report improved confidence with stair negotiation and safety awareness with fatigue  6   Reduce number of falls while negotiating stairs than previous months to reduce risk of injury  7   Improve FGA to above cutoff risk to reduce risk of falls  Plan  Patient would benefit from: skilled physical therapy  Planned therapy interventions: neuromuscular re-education, manual therapy, patient education, home exercise program, therapeutic exercise, therapeutic activities and gait training  Frequency: 2x week  Duration in weeks: 8  Plan of Care beginning date: 8/22/2022  Plan of Care expiration date: 10/21/2022  Treatment plan discussed with: patient        Subjective Evaluation    History of Present Illness  Mechanism of injury: Present at PT: issues started in feb/march  Started when she was at [facility] and having to do stairs a lot  Never had issues prior  Stairs and curbs have been rough  Knees give out, mostly her R knee however feels like it switches  Notes fatigue  Needs to save energy to perform stairs at end of the day  1st fall was coming down stairs to go to her boyfriends house  Was carrying a lot of stuff  [place] her knee go out  Most recent fall  Was getting out of bed, less impact makes her fall    A lot of sitting  Will stand at [employer]  Tote bag during travel to work  Has to walk 3 blocks to get to work  Will feel fatigued walking to work depending on the day  Will feel it in her legs more than anything  Feels like she has tremoring  Last night had n/t in all her R fingers  Lasted 10min, neck felt stiff  Her and her mom have discussed the thought of MS   Has had imaging in the past which is clear, however, they are planning on finishing the CNS with thoracolumbar imaging  Pain  Current pain ratin  At best pain ratin  At worst pain ratin  Location: Low back and R knee  Social Support  Stairs in house: yes   Lives in: multiple-level home      Diagnostic Tests  MRI studies: abnormal  Treatments  Previous treatment: physical therapy  Patient Goals  Patient goals for therapy: increased strength and improved balance  Patient goal: Feel more confident walking and moving around          Objective     Neurological Testing     Sensation     Lumbar   Left   Intact: light touch    Right   Intact: light touch    Reflexes   Left   Biceps (C5/C6): normal (2+)  Triceps (C7): trace (1+)  Patellar (L4): trace (1+)  Achilles (S1): trace (1+)  Clonus sign: negative    Right   Biceps (C5/C6): normal (2+)  Triceps (C7): trace (1+)  Patellar (L4): trace (1+)  Achilles (S1): trace (1+)  Clonus sign: negative    Strength/Myotome Testing     Left Hip   Planes of Motion   Flexion: 4  Extension: 4-  Abduction: 4-    Right Hip   Planes of Motion   Flexion: 4  Extension: 4-  Abduction: 4-    Left Knee   Flexion: 4-  Extension: 4    Right Knee   Flexion: 4-  Extension: 4    Left Ankle/Foot   Dorsiflexion: 4-  Plantar flexion: 4+    Right Ankle/Foot   Dorsiflexion: 4-  Plantar flexion: 4+  Neuro Exam:     Sensation   Light touch LE: left WNL and right WNL    Coordination   Rapid alternating movements: UE WNL and LE impaired     Functional outcomes   6 minute walk test: 1110ft  5x sit to stand: 13 81 (seconds)  Functional outcome comment: FGA 22             Precautions: Falls, Hx of depression, caution with stairs  Re-eval Date: 2022    Date        Visit Count 1       FOTO See IE       Pain In See IE       Pain Out                Testing        TUG        5xSTS 13 81       Gait speed        FGA 22       2/6MWT 1110ft       Neuro Re-Ed        Dynamic balance        Static balance Obstacle course                                        Ther Ex        SLR x 4        HR/TR        Mini Squats        Step ups        Hamstring stretch        Hip flexor stretch        Calf stretch                Ther Activity        ADL                Gait Training        Divided Attention        Head turns        Modalities         prn

## 2022-08-25 LAB
ACHR BIND AB SER-SCNC: >80 NMOL/L (ref 0–0.24)
ACHR BLOCK AB/ACHR TOTAL SFR SER: 49 % (ref 0–25)

## 2022-08-26 ENCOUNTER — TELEPHONE (OUTPATIENT)
Dept: NEUROLOGY | Facility: CLINIC | Age: 22
End: 2022-08-26

## 2022-08-26 DIAGNOSIS — G70.00 MYASTHENIA GRAVIS (HCC): Primary | ICD-10-CM

## 2022-08-26 DIAGNOSIS — E55.9 VITAMIN D DEFICIENCY: ICD-10-CM

## 2022-08-26 RX ORDER — PYRIDOSTIGMINE BROMIDE 60 MG/1
30 TABLET ORAL 3 TIMES DAILY
Qty: 45 TABLET | Refills: 2 | Status: SHIPPED | OUTPATIENT
Start: 2022-08-26

## 2022-08-26 RX ORDER — ERGOCALCIFEROL 1.25 MG/1
50000 CAPSULE ORAL WEEKLY
Qty: 12 CAPSULE | Refills: 0 | Status: SHIPPED | OUTPATIENT
Start: 2022-08-26

## 2022-08-26 NOTE — TELEPHONE ENCOUNTER
Late entry--called patient yesterday afternoon to review lab results  Concern for myasthenia gravis based on elevated acetylcholine receptor abs  Her vit D is also low and needs Rx replacement  Discussed with patient yesterday that my plan was to reach out to Dr To Amor and Dr Julia Reyes for further guidance  Would like her seen by Dr Julia Reyes, our neuromuscular specialist given this diagnosis  EMG is pending  Discussed I would call her tomorrow with plan  Spoke with Dr Julia Reyes who recommends starting Mestinon 30mg TID and increase to 60mg TID if tolerating  If no improvement in a few weeks, start prednisone 10-20mg  EMG will be moved up  Dr To Amor also recommending CT chest to r/o thymoma  Patient has had recent thyroid studies, WNL  Called patient this afternoon and discussed plan  She is on her way to [facility] for a few days with her boyfriend  She has not had worsening symptoms  She is agreeable to start Mestinon and call me 1 week after starting the medication to increase if tolerating  She is agreeable to CT chest   She is aware Dr Julia Reyes is moving up EMG appt and will be called with that

## 2022-08-27 LAB — MUSK AB SER IA-ACNC: <1 U/ML

## 2022-08-29 NOTE — TELEPHONE ENCOUNTER
Contacted patient and scheduled her for Ct Chest w/c on 9/13/22 @  in [facility]

## 2022-09-06 ENCOUNTER — TELEMEDICINE (OUTPATIENT)
Dept: PSYCHIATRY | Facility: CLINIC | Age: 22
End: 2022-09-06
Payer: COMMERCIAL

## 2022-09-06 ENCOUNTER — OFFICE VISIT (OUTPATIENT)
Dept: PHYSICAL THERAPY | Facility: CLINIC | Age: 22
End: 2022-09-06
Payer: COMMERCIAL

## 2022-09-06 DIAGNOSIS — M62.81 MUSCLE WEAKNESS: Primary | ICD-10-CM

## 2022-09-06 DIAGNOSIS — F32.89 OTHER DEPRESSION: Primary | ICD-10-CM

## 2022-09-06 DIAGNOSIS — R29.6 FALLS FREQUENTLY: ICD-10-CM

## 2022-09-06 PROCEDURE — 90834 PSYTX W PT 45 MINUTES: CPT

## 2022-09-06 PROCEDURE — 97110 THERAPEUTIC EXERCISES: CPT

## 2022-09-06 NOTE — PSYCH
INDIVIDUAL SESSION NOTE     Length of time in session: 50 minutes, follow up in 2 weeks     Psychotherapy Provided: Individual      Diagnosis ICD-10-CM Associated Orders   1  Other depression  F32 89           Goals addressed in session: Goal 1     Pain:      none    0    Current suicide risk:  minimal    Data: Met with Mary Price for a scheduled individual session  Topics discussed included family stressors, physical health concerns, mood regulation and symptoms, life goals and values and life changes/transitions  Natacha Benavides is feeling "very good"  She stated she got a diagnosis of Myasthenia Gravis  She feels relieved and validated  Natacha Benavides is now on medication and she can feel it helping already  She feels like her legs feel better and she's having an easier time walking and going up/down the stairs  Natacha Benavides feels her mother has been a bit easier on her since she was diagnosed  Her father has been the same; she stated he is still short tempered and irritable  Natacha Benavides stated he often will say that she only has symptoms when she's at home but not when she's with her boyfriend, which Natacha Benavides stated isn't true  She said he's not just like this with her but also with her mother  Natacha Benavides and I talked about his issues and his tendency to misdirect this emotions at others  Natacha Benavides shows evidence of utilizing effective communication skills to manage mental health symptoms  During this session, this clinical used the following therapeutic modalities supportive psychotherapy, client-centered therapy and stress management skills  Assessment:  Natacha Benavides presents with a improved mood  her affect is brighter  Natacha Benavides was oriented x3  She was focused and engaged  Natacha Benavides exhibits good therapeutic rapport with this clinician  she continues to exhibit willingness to work on treatment goals and objectives  Natacha Benavides presents with a minimal risk of suicide, minimal risk of self-harm and minimal of harm to others        Plan:  Natacha Benavides will return in 2 weeks for the next scheduled session  Between sessions, she  will continue to openly communicate with her parents and allow herself time to adjust to her new medication and will report back during the next session re: success and barriers  At the next session, this clinical will use supportive psychotherapy and client-centered therapy to address her depression, in an effort to assist Edis Gaspar with meeting treatment goals  Behavioral Health Treatment Plan [facility]: Diagnosis and Treatment Plan explained to Bart Farley relates understanding diagnosis and is agreeable to Treatment Plan  Yes     Virtual Regular Visit    Verification of patient location:    Patient is located in the following state in which I hold an active license [place]      Assessment/Plan:    Problem List Items Addressed This Visit    None     Visit Diagnoses     Other depression    -  Primary          Goals addressed in session: Goal 1          Reason for visit is   Chief Complaint   Patient presents with    Virtual Regular Visit        Encounter provider Ruchi Mcrae LCSW    Provider located at [address] [place] [id number] [phone]      Recent Visits  No visits were found meeting these conditions  Showing recent visits within past 7 days and meeting all other requirements  Future Appointments  No visits were found meeting these conditions  Showing future appointments within next 150 days and meeting all other requirements       The patient was identified by name and date of birth  Mars Allen was informed that this is a telemedicine visit and [facility] the [facility] and patient was informed that this is a secure, HIPAA-compliant platform  She agrees to proceed     My office door was closed  No one else was in the room    She acknowledged consent and understanding of privacy and security of the video platform  The patient has agreed to participate and understands they can discontinue the visit at any time  Patient is aware this is a billable service  Therese Vinson is a 25 y o  female  HPI     Past Medical History:   Diagnosis Date    Depression     History of migraine     Low vitamin D level     Uses birth control        Past Surgical History:   Procedure Laterality Date    ADENOIDECTOMY      TONSILLECTOMY AND ADENOIDECTOMY         Current Outpatient Medications   Medication Sig Dispense Refill    Baclofen 5 MG TABS Take 1 po HS 30 tablet 5    cholecalciferol (VITAMIN D3) 1,000 units tablet Take 1,000 Units by mouth daily      ergocalciferol (VITAMIN D2) 50,000 units Take 1 capsule (50,000 Units total) by mouth once a week 12 capsule 0    ferrous sulfate 325 (65 Fe) mg tablet Take 325 mg by mouth daily at bedtime      Fiber Complete TABS Take 2 tablets by mouth daily      FLUoxetine (PROzac) 20 MG tablet Take 1 tablet (20 mg total) by mouth daily 30 tablet 1    Levonorgestrel (MIRENA) 20 MCG/DAY IUD 1 each by Intrauterine route once      Magnesium 400 MG CAPS Take 400 mg by mouth daily      ondansetron (Zofran ODT) 4 mg disintegrating tablet Take 1 tablet (4 mg total) by mouth every 6 (six) hours as needed for nausea or vomiting 20 tablet 0    pyridostigmine (Mestinon) 60 mg tablet Take 0 5 tablets (30 mg total) by mouth 3 (three) times a day 45 tablet 2    rizatriptan (Maxalt-MLT) 10 mg disintegrating tablet Take 1 tablet (10 mg total) by mouth as needed for migraine Take at the onset of migraine; if symptoms continue or return, may take another dose at least 2 hours after first dose  Take no more than 2 doses in a day  18 tablet 0    topiramate (TOPAMAX) 25 mg tablet 2 tab po qhs 180 tablet 3     No current facility-administered medications for this visit          Allergies   Allergen Reactions    Augmentin [Amoxicillin-Pot Clavulanate] Diarrhea    Fish-Derived Products - Food Allergy Vomiting       Review of Systems    Video Exam    There were no vitals filed for this visit      Physical Exam     I spent 52 minutes directly with the patient during this visit

## 2022-09-08 ENCOUNTER — TELEPHONE (OUTPATIENT)
Dept: NEUROLOGY | Facility: CLINIC | Age: 22
End: 2022-09-08

## 2022-09-08 ENCOUNTER — OFFICE VISIT (OUTPATIENT)
Dept: PHYSICAL THERAPY | Facility: CLINIC | Age: 22
End: 2022-09-08
Payer: COMMERCIAL

## 2022-09-08 DIAGNOSIS — R29.6 FALLS FREQUENTLY: ICD-10-CM

## 2022-09-08 DIAGNOSIS — M62.81 MUSCLE WEAKNESS: Primary | ICD-10-CM

## 2022-09-08 PROCEDURE — 97110 THERAPEUTIC EXERCISES: CPT

## 2022-09-08 NOTE — TELEPHONE ENCOUNTER
That is great to hear  She is seeing Dr Lenny Callahan in 2 weeks  Let's just keep her on current dose since she is tolerating and feeling well, and will let Dr Lenny Callahan further advise if med adjustment is needed at their visit  She should call us if any issues in the next 2 weeks  So glad to hear she is feeling better!

## 2022-09-08 NOTE — TELEPHONE ENCOUNTER
Pt left voicemail with update  She reports she started taking Mestinon 1 weeks ago, taking as directed  States for the first 2 days she did have an upset stomach right after taking the medication  She has since started taking this with food, feels she has also gotten used to the medication  Reports any side effects have resolved  She is doing much better now  Feels Mestinon is overall very helpful and is going really well

## 2022-09-08 NOTE — PROGRESS NOTES
Daily Note     Today's date: 2022  Patient name: Carisa Lazo  : 2000  MRN: [id number]  Referring provider: Mark Estrada PA-C  Dx:   Encounter Diagnosis     ICD-10-CM    1  Muscle weakness  M62 81    2  Falls frequently  R29 6        Start Time: 1334  Stop Time: 1412  Total time in clinic (min): 38 minutes    Subjective: Things going well  A little tired and sore after last session, nothing crazy  Nothing that interfered with climbing stairs  Objective: See treatment diary below      Assessment:  Progressing well with no reports of pain or intense fatigue  Few standing rests for LE fatigue needed  Frequent check-ins to determine general fatigue to avoid over-exertion  Shows LE fatigue with loss of form at end of session  Seated rest at end of session prior to leaving  Patient would benefit from continued PT to improve muscular strength and endurance  Plan: Continue per plan of care  Monitor exercise progressions       Precautions: Falls, Hx of depression, caution with stairs  Re-eval Date: 2022    Date      Visit Count 1 2 3     FOTO See IE       Pain In See IE       Pain Out                Testing        TUG        5xSTS 13 81       Gait speed        FGA 22       2/6MWT 1110ft       Neuro Re-Ed        Dynamic balance        Static balance        Obstacle course                                        Ther Ex        SLR x 4  Mat 2x10 flx, abd Standing blue TB 2x12 flx, ext     Bird dog   2x6     Swimmers  2x8      Mini Squats  At mat 2x10 Chair tap x6, mini squat x8     Bridges  3' 2x10      Step ups  8' 2x10 Lat step 8' 2x10     Leg press  55lb 2x12 55lb 3x12     Banded lateral steps   OTB 15ft 2x3 laps                     Hamstring stretch        Hip flexor stretch        Calf stretch        Education  AF      Ther Activity        ADL                Gait Training        Divided Attention        Head turns        Modalities         prn

## 2022-09-09 ENCOUNTER — TELEPHONE (OUTPATIENT)
Dept: OBGYN CLINIC | Facility: CLINIC | Age: 22
End: 2022-09-09

## 2022-09-09 NOTE — TELEPHONE ENCOUNTER
Pt continuing with brown spotting most days since IUD insertion 6/2022  Here for yearly 8/16/2022  Will recall if persists after 2 months

## 2022-09-09 NOTE — TELEPHONE ENCOUNTER
Per Dr Errol Fernandez an appointment is scheduled for 9/23/22 @ 1:30 pm  Patient aware and confirmed

## 2022-09-13 ENCOUNTER — HOSPITAL ENCOUNTER (OUTPATIENT)
Dept: CT IMAGING | Facility: HOSPITAL | Age: 22
Discharge: HOME | End: 2022-09-13
Payer: COMMERCIAL

## 2022-09-13 ENCOUNTER — OFFICE VISIT (OUTPATIENT)
Dept: PHYSICAL THERAPY | Facility: CLINIC | Age: 22
End: 2022-09-13
Payer: COMMERCIAL

## 2022-09-13 DIAGNOSIS — R29.6 FALLS FREQUENTLY: ICD-10-CM

## 2022-09-13 DIAGNOSIS — G70.00 MYASTHENIA GRAVIS (HCC): ICD-10-CM

## 2022-09-13 DIAGNOSIS — M62.81 MUSCLE WEAKNESS: Primary | ICD-10-CM

## 2022-09-13 PROCEDURE — 71260 CT THORAX DX C+: CPT

## 2022-09-13 PROCEDURE — 97110 THERAPEUTIC EXERCISES: CPT

## 2022-09-13 PROCEDURE — G1004 CDSM NDSC: HCPCS

## 2022-09-13 RX ADMIN — IOHEXOL 85 ML: 350 INJECTION, SOLUTION INTRAVENOUS at 08:16

## 2022-09-13 NOTE — PROGRESS NOTES
Daily Note     Today's date: 2022  Patient name: Yocasta Singh  : 2000  MRN: [id number]  Referring provider: Misty Montejo PA-C  Dx:   Encounter Diagnosis     ICD-10-CM    1  Muscle weakness  M62 81    2  Falls frequently  R29 6        Start Time: 1330  Stop Time: 1410  Total time in clinic (min): 40 minutes    Subjective: Myah Honeycutt on , felt tired out of no where  Missed her one B-vitamin supplement but otherwise no difference  Today she feels somewhat normal just nervous about falling and about her general energy  Objective: See treatment diary below      Assessment:  Adjusted bird dog form to facilitate strength through more hip extension by reducing lever arm  Shows reduced eccentric control with bird dogs  Strengthening program maintained due to concerns of falling and more fatigue  Trials of more core stability exercises with form breakdown after short duration holds  Updated HEP to increase strength challenge  Discussed importance of symptom tracking and reach out to neurology if she feels like things are beginning to trend backward from where things were going  Patient would benefit from continued PT to improve muscular strength and endurance  Plan: Continue per plan of care  Monitor exercise progressions  Monitor falls       Precautions: Falls, Hx of depression, caution with stairs  Re-eval Date: 2022    Date     Visit Count 1 2 3 4    FOTO See IE       Pain In See IE       Pain Out                Testing        TUG        5xSTS 13 81       Gait speed        FGA 22       2/6MWT 1110ft       Neuro Re-Ed        Dynamic balance        Static balance        Obstacle course                                        Ther Ex        SLR x 4  Mat 2x10 flx, abd Standing blue TB 2x12 flx, ext     Bird dog   2x6 2x6     Swimmers  2x8  NV    Mini Squats  At mat 2x10 Chair tap x6, mini squat x8 Mini squat 2x10    Bridges  3' 2x10  5' 2x10    Step ups  8' 2x10 Lat step 8' 2x10 Leg press  55lb 2x12 55lb 3x12 55lb 3x12    Banded lateral steps   OTB 15ft 2x3 laps clb machine 15lb 2x12    Side plank    Knees 3x10' hold ea side            Hamstring stretch        Hip flexor stretch        Calf stretch        Education  AF      Ther Activity        ADL                Gait Training        Divided Attention        Head turns        Modalities         prn

## 2022-09-14 ENCOUNTER — HOSPITAL ENCOUNTER (OUTPATIENT)
Dept: MRI IMAGING | Facility: HOSPITAL | Age: 22
Discharge: HOME | End: 2022-09-14
Payer: COMMERCIAL

## 2022-09-14 DIAGNOSIS — M62.81 MUSCLE WEAKNESS: ICD-10-CM

## 2022-09-14 PROCEDURE — 72157 MRI CHEST SPINE W/O & W/DYE: CPT

## 2022-09-14 PROCEDURE — G1004 CDSM NDSC: HCPCS

## 2022-09-14 PROCEDURE — A9585 GADOBUTROL INJECTION: HCPCS | Performed by: PHYSICIAN ASSISTANT

## 2022-09-14 RX ADMIN — GADOBUTROL 8 ML: 604.72 INJECTION INTRAVENOUS at 15:24

## 2022-09-15 ENCOUNTER — HOSPITAL ENCOUNTER (OUTPATIENT)
Dept: NEUROLOGY | Facility: CLINIC | Age: 22
End: 2022-09-15
Payer: COMMERCIAL

## 2022-09-15 ENCOUNTER — APPOINTMENT (OUTPATIENT)
Dept: PHYSICAL THERAPY | Facility: CLINIC | Age: 22
End: 2022-09-15
Payer: COMMERCIAL

## 2022-09-15 DIAGNOSIS — M62.81 MUSCLE WEAKNESS: ICD-10-CM

## 2022-09-15 PROCEDURE — 95911 NRV CNDJ TEST 9-10 STUDIES: CPT | Performed by: PSYCHIATRY & NEUROLOGY

## 2022-09-15 PROCEDURE — 95886 MUSC TEST DONE W/N TEST COMP: CPT | Performed by: PSYCHIATRY & NEUROLOGY

## 2022-09-16 ENCOUNTER — TELEPHONE (OUTPATIENT)
Dept: NEUROLOGY | Facility: CLINIC | Age: 22
End: 2022-09-16

## 2022-09-16 NOTE — TELEPHONE ENCOUNTER
Received voicemail from [facility] ([facility]) radiology reporting significant findings on pt's CT    [phone]    Called back and confirmed receipt of message  Please see CT results in chart:    IMPRESSION:     1  Residual thymic tissue is noted to be somewhat full for patient's age and suggests thymic hyperplasia  Otherwise no findings suspicious for thymoma  2   Incidental thyroid nodule(s) for which nonemergent thyroid ultrasound is recommended  3   Incidentally noted is a 1 9 cm cyst at the level of the suprasternal notch just left of midline    This could represent an incidental dermoid cyst or thyroglossal duct cyst

## 2022-09-20 ENCOUNTER — OFFICE VISIT (OUTPATIENT)
Dept: PHYSICAL THERAPY | Facility: CLINIC | Age: 22
End: 2022-09-20
Payer: COMMERCIAL

## 2022-09-20 DIAGNOSIS — R29.6 FALLS FREQUENTLY: ICD-10-CM

## 2022-09-20 DIAGNOSIS — M62.81 MUSCLE WEAKNESS: Primary | ICD-10-CM

## 2022-09-20 PROCEDURE — 97110 THERAPEUTIC EXERCISES: CPT

## 2022-09-20 NOTE — PROGRESS NOTES
Daily Note     Today's date: 2022  Patient name: Ashli Prabhakar  : 2000  MRN: [id number]  Referring provider: Hussein Duffy PA-C  Dx:   Encounter Diagnosis     ICD-10-CM    1  Muscle weakness  M62 81    2  Falls frequently  R29 6        Start Time: 1338  Stop Time: 1411  Total time in clinic (min): 33 minutes    Subjective: After EMG notes a little difficulty walking, bruised her knee hitting it on the stair  Tired after EMG  Denies a fall though  Seeing movement specialist Friday  Objective: See treatment diary below      Assessment:  Pt with fewer rest breaks throughout session  Progressing core and LE strengthening as able  Monitor symptoms post exertion near end of session to determine if further strengthening is tolerated  [place] session may be due to less rest breaks  Patient would benefit from continued PT to improve muscular strength and endurance  Plan: Continue per plan of care  Monitor exercise progressions  Monitor falls       Precautions: Falls, Hx of depression, caution with stairs  Re-eval Date: 2022    Date    Visit Count 1 2 3 4 5   FOTO See IE       Pain In See IE       Pain Out                Testing        TUG        5xSTS 13 81       Gait speed        FGA 22       2/6MWT 1110ft       Neuro Re-Ed        Dynamic balance        Static balance        Obstacle course                                        Ther Ex        SLR x 4  Mat 2x10 flx, abd Standing blue TB 2x12 flx, ext  Standing blue TB 2x12 flex, ext   Bird dog   2x6 2x6     Swimmers  2x8  NV 1x15, 1x10   Mini Squats  At mat 2x10 Chair tap x6, mini squat x8 Mini squat 2x10    Bridges  3' 2x10  5' 2x10 5' 2x12   Step ups  8' 2x10 Lat step 8' 2x10  8' lat step 45' x2   Leg press  55lb 2x12 55lb 3x12 55lb 3x12 75lb 3x10   Banded lateral steps   OTB 15ft 2x3 laps clb machine 15lb 2x12    Side plank    Knees 3x10' hold ea side Knees 2x20" hold ea           Hamstring stretch        Hip flexor stretch        Calf stretch        Education  AF      Ther Activity        ADL                Gait Training        Divided Attention        Head turns        Modalities         prn

## 2022-09-22 ENCOUNTER — OFFICE VISIT (OUTPATIENT)
Dept: PHYSICAL THERAPY | Facility: CLINIC | Age: 22
End: 2022-09-22
Payer: COMMERCIAL

## 2022-09-22 DIAGNOSIS — M62.81 MUSCLE WEAKNESS: Primary | ICD-10-CM

## 2022-09-22 DIAGNOSIS — R29.6 FALLS FREQUENTLY: ICD-10-CM

## 2022-09-22 PROCEDURE — 97110 THERAPEUTIC EXERCISES: CPT | Performed by: PHYSICAL THERAPY ASSISTANT

## 2022-09-22 NOTE — PROGRESS NOTES
Daily Note     Today's date: 2022  Patient name: Lucila Ramirez  : 2000  MRN: [id number]  Referring provider: Catrina Holloway PA-C  Dx:   Encounter Diagnosis     ICD-10-CM    1  Muscle weakness  M62 81    2  Falls frequently  R29 6                   Subjective: Pt arrived to therapy 15 min late able to accommodate for full session  Pt states she had a bad morning with a lot of fatigue  Objective: See treatment diary below      Assessment:  Minimal breaks required throughout therapy session  Pt tolerated all TE with appropriate fatigue noted at end of session    Progressing core and LE strengthening as able  Patient would benefit from continued PT to improve muscular strength and endurance  Plan: Continue per plan of care  Monitor exercise progressions  Monitor falls       Precautions: Falls, Hx of depression, caution with stairs  Re-eval Date: 2022    Date    Visit Count 1 2 3 4 5 6   FOTO See IE        Pain In See IE        Pain Out                 Testing         TUG         5xSTS 13 81        Gait speed         FGA 22        2/6MWT 1110ft        Neuro Re-Ed         Dynamic balance         Static balance         Obstacle course                                             Ther Ex         SLR x 4  Mat 2x10 flx, abd Standing blue TB 2x12 flx, ext  Standing blue TB 2x12 flex, ext Standing blue TB  2x10 flex/ext/abd   Bird dog   2x6 2x6      Swimmers  2x8  NV 1x15, 1x10    Mini Squats  At mat 2x10 Chair tap x6, mini squat x8 Mini squat 2x10  Mini squats 3x8   Bridges  3' 2x10  5' 2x10 5' 2x12 5" 2x10   Step ups  8' 2x10 Lat step 8' 2x10  8' lat step 45' x2 8" step 2x10 each   Leg press  55lb 2x12 55lb 3x12 55lb 3x12 75lb 3x10 75# 3x10   Banded lateral steps   OTB 15ft 2x3 laps clb machine 15lb 2x12  Cable column  20# x 10 ea lat and bkwd   Side plank    Knees 3x10' hold ea side Knees 2x20" hold ea Knees 2x20" hold ea            Hamstring stretch         Hip flexor stretch         Calf stretch         Education  AF       Ther Activity         ADL                  Gait Training         Divided Attention         Head turns         Modalities          prn

## 2022-09-23 ENCOUNTER — OFFICE VISIT (OUTPATIENT)
Dept: NEUROLOGY | Facility: CLINIC | Age: 22
End: 2022-09-23
Payer: COMMERCIAL

## 2022-09-23 VITALS
HEIGHT: 67 IN | OXYGEN SATURATION: 99 % | HEART RATE: 80 BPM | DIASTOLIC BLOOD PRESSURE: 88 MMHG | BODY MASS INDEX: 29.32 KG/M2 | WEIGHT: 186.8 LBS | RESPIRATION RATE: 18 BRPM | SYSTOLIC BLOOD PRESSURE: 138 MMHG | TEMPERATURE: 97.2 F

## 2022-09-23 DIAGNOSIS — M62.81 MUSCLE WEAKNESS: Primary | ICD-10-CM

## 2022-09-23 DIAGNOSIS — R51.9 FREQUENT HEADACHES: ICD-10-CM

## 2022-09-23 PROCEDURE — 99215 OFFICE O/P EST HI 40 MIN: CPT | Performed by: PSYCHIATRY & NEUROLOGY

## 2022-09-23 RX ORDER — TOPIRAMATE 25 MG/1
TABLET ORAL
Qty: 180 TABLET | Refills: 3 | Status: SHIPPED | OUTPATIENT
Start: 2022-09-23

## 2022-09-23 NOTE — PATIENT INSTRUCTIONS
Please be careful with certain meds    Marolides  erythromycin ,cipro  and  beta  blockers     Too much magnesium can interfer     Not  c/w MS

## 2022-09-23 NOTE — PROGRESS NOTES
Patient ID: Shin Dye is a 25 y o  female  Assessment/Plan:    Myasthenia gravis  Shin Dye is a 25 y o  female who presents today for neuromuscular evaluation for myasthenia gravis  She presented initially with weakness that has been ongoing for about 6-8 months and given her response to pyridostigmine and lab findings of high titers positive AChR binding/blocking antibodies is most consistent with myasthenia  Has already responded to pyridostigmine which patient takes 30 mg 3 times daily  Workup reviewed:   AChR antibody testing (8/22): binding > 80 (H), blocking 49 (H)  o MuSK negative  o B12 > 500; vitamin D 15 5 (L); Mag 2 2; Cu 77 (L)  o Sjogrens, EDUARDO, RF, ESR negative  o Additional hormonal labs unremarkable   Imaging:   o MRI/MRA brain (2022): stable pituitary enlargement (ddx hyperplasia > > macroadenoma); normal MRA w/o aneurysm  o MRI CS/TS (2022): sm central disc protrusion C5-6 w/ canal narrowing but no abnormal cord signal and no foraminal stenosis; TS normal  o CT chest (2022): large residual thymic tissue consistent with thymic hyperplasia   EMG (RUE/RLE 9/22): Overall normal w/ v  subtly decreased IP recruitment     Plan:   MG ADL Score: 8/24   Pyridostigmine: continue 30 mg (1/2 tab) tid for now - patient does notice wearing off around 5 hrs and we discussed importance of timing activities and doing things in moderation (e g  no long bike rides, start with shorter distances, etc )   Referral to Dr Yoly Rucker surgery for evaluation for possible thymectomy   Discussed multiple different medication options at length and the general expected course of the disease   Discussed importance of staying in close contact with neurology team and patient inquired about myasthenic crisis symptoms  Questions answered to patient and patient's mother's satisfaction     Discussed that patient's symptoms are in the mild category at this time and that she should avoid too much social media which can often make patient's more frightened than necessary - patient did note that on TikTok people are much more positive about their experiences   F/u w/ Chandan Roy around December and Dr Zeeshan Harris thereafter    Migraines  Patient has migraines which she has been experiencing for a long time and are currently adequately treated  Can continue current regimen    Plan:   Continue preventive topiramate 50 mg qhs for now (refill provided today)   Continue rizatriptan 10 mg prn for acute management   Continue zofran prn for acute management     Diagnoses and all orders for this visit:    Myasthenia gravis  -     Ambulatory Referral to Thoracic Surgery; Future    Frequent headaches  -     topiramate (TOPAMAX) 25 mg tablet; 2 tab po qhs         Subjective:    Bianka Spears is a 25 y o  female w/ PMH migraines and newly diagnosed myasthenia who presents for neuromuscular evaluation for weakness  Patient reports that around March she started noticing increasing weakness with falls and dropping things that were concerning to her  She notes that she had gotten her COVID booster in January and that she had gone back to college end of January to beginning of February  At first her symptoms were vague enough that she and her mother were not sure what to make of it and initially when she went to her PCP, she was prescribed PT for gait (in the setting of the frequent falls)  However, by April it became very apparent that patient had become significantly weaker - she was unable to help with most of her move out of her 3rd floor college apartment and she was living at home and commuting to school because she could not easily mount the stairs to that apartment   By spring she was afraid to go up and down stairs, had difficulty lifting many objects, started having tremors when trying to  every day objects and was noted to be "snarling" in many of her pictures (she felt she was unable to smile regularly in her graduation photos)  Notably, by summer vacation she was so fearful of her weakness that she was unable to walk on the sand or go in the ocean  About 2 months ago she did begin to have intermittent [id number] diplopia which she still occasionally gets (about 1-2x/week) primarily when she is tired/at the end of the day  She notes that at the end of the day sometimes she has to tilt her head back and look downward to focus well on the television or reading  She did discuss this with her neurology team who ordered an EMG/rep stim and myasthenic labs and she was found to have high titers of AChR antibodies and a normal EMG (rep stim unable to be completed due to technical issues but given her other findings, this was not necessary for diagnosis)  CT chest revealed enlarged residual thymic tissues consistent with thymic hyperplasia and other than her known pituitary enlargement, her neuroaxial imaging was overall normal  Patient came with her mother and they both have questions about endurance and modulating activity appropriately to expectations as well as things to watch out for in terms of myasthenic crises  These topics were discussed at length and all questions were answered satisfactorily  At this time patient reports that at her worst she was feeling about 40% of her normal self and since starting the pyridostigmine she feels closer to 70-80% of her baseline  Sometimes she has overdone it and we discussed importance of expectation management in terms of endurance  Otherwise she Denies N/V/F/C, abdominal pain, dizziness, HA, visual changes, new weakness or sensory changes, bowel or bladder dysfunction  Has not had abdominal side effects from pyridostigmine as long as she eats when she takes her dose  The following portions of the patient's history were reviewed and updated as appropriate:   She  has a past medical history of Depression, History of migraine, Low vitamin D level, and Uses birth control    She   Patient Active Problem List    Diagnosis Date Noted    Myasthenia gravis     Migraine without aura and without status migrainosus, not intractable 08/02/2022    Annual physical exam 03/08/2022    Allergic contact dermatitis due to plants, except food 12/21/2021    Pituitary abnormality ([facility] [address] ) 07/14/2020    Frequent headaches 03/16/2020    Menorrhagia with regular cycle 05/13/2019    Iron deficiency anemia 05/13/2019    Hypertrophy of adenoids 05/23/2008    Obstructive sleep apnea 05/23/2008    Other dyspnea and respiratory abnormality 05/23/2008    Other specified congenital anomalies 05/23/2008     She  has a past surgical history that includes Tonsillectomy and adenoidectomy and ADENOIDECTOMY  Her family history includes Breast cancer in her paternal grandmother; Diabetes in her father; Hypertension in her father and maternal grandmother; No Known Problems in her mother  She  reports that she has never smoked  She has never used smokeless tobacco  She reports current alcohol use of about 10 0 standard drinks of alcohol per week  She reports that she does not use drugs    Current Outpatient Medications   Medication Sig Dispense Refill    Baclofen 5 MG TABS Take 1 po HS 30 tablet 5    cholecalciferol (VITAMIN D3) 1,000 units tablet Take 1,000 Units by mouth daily      ergocalciferol (VITAMIN D2) 50,000 units Take 1 capsule (50,000 Units total) by mouth once a week 12 capsule 0    ferrous sulfate 325 (65 Fe) mg tablet Take 325 mg by mouth daily at bedtime      Fiber Complete TABS Take 2 tablets by mouth daily      FLUoxetine (PROzac) 20 MG tablet Take 1 tablet (20 mg total) by mouth daily 30 tablet 1    Levonorgestrel (MIRENA) 20 MCG/DAY IUD 1 each by Intrauterine route once      Magnesium 400 MG CAPS Take 400 mg by mouth daily      ondansetron (Zofran ODT) 4 mg disintegrating tablet Take 1 tablet (4 mg total) by mouth every 6 (six) hours as needed for nausea or vomiting 20 tablet 0    pyridostigmine (Mestinon) 60 mg tablet Take 0 5 tablets (30 mg total) by mouth 3 (three) times a day 45 tablet 2    rizatriptan (Maxalt-MLT) 10 mg disintegrating tablet Take 1 tablet (10 mg total) by mouth as needed for migraine Take at the onset of migraine; if symptoms continue or return, may take another dose at least 2 hours after first dose  Take no more than 2 doses in a day  18 tablet 0    topiramate (TOPAMAX) 25 mg tablet 2 tab po qhs 180 tablet 3     No current facility-administered medications for this visit  Current Outpatient Medications on File Prior to Visit   Medication Sig    Baclofen 5 MG TABS Take 1 po HS    cholecalciferol (VITAMIN D3) 1,000 units tablet Take 1,000 Units by mouth daily    ergocalciferol (VITAMIN D2) 50,000 units Take 1 capsule (50,000 Units total) by mouth once a week    ferrous sulfate 325 (65 Fe) mg tablet Take 325 mg by mouth daily at bedtime    Fiber Complete TABS Take 2 tablets by mouth daily    FLUoxetine (PROzac) 20 MG tablet Take 1 tablet (20 mg total) by mouth daily    Levonorgestrel (MIRENA) 20 MCG/DAY IUD 1 each by Intrauterine route once    Magnesium 400 MG CAPS Take 400 mg by mouth daily    ondansetron (Zofran ODT) 4 mg disintegrating tablet Take 1 tablet (4 mg total) by mouth every 6 (six) hours as needed for nausea or vomiting    pyridostigmine (Mestinon) 60 mg tablet Take 0 5 tablets (30 mg total) by mouth 3 (three) times a day    rizatriptan (Maxalt-MLT) 10 mg disintegrating tablet Take 1 tablet (10 mg total) by mouth as needed for migraine Take at the onset of migraine; if symptoms continue or return, may take another dose at least 2 hours after first dose  Take no more than 2 doses in a day   [DISCONTINUED] topiramate (TOPAMAX) 25 mg tablet 2 tab po qhs     No current facility-administered medications on file prior to visit  She is allergic to augmentin [amoxicillin-pot clavulanate] and fish-derived products - food allergy       Objective:    Blood pressure 138/88, pulse 80, temperature (!) 97 2 °F (36 2 °C), temperature source Tympanic, resp  rate 18, height 5' 7" (1 702 m), weight 84 7 kg (186 lb 12 8 oz), SpO2 99 %  Physical Exam   Vitals reviewed  Constitutional:    Not in acute distress  Normal appearance  Not ill-appearing, toxic-appearing or diaphoretic  HENT:   Normocephalic and atraumatic  External ear normal b/l  Nose normal  No congestion or rhinorrhea  Mucous membranes are moist   Oropharynx is clear  No oropharyngeal exudate or posterior oropharyngeal erythema  Eyes:    No scleral icterus  No discharge b/l  Conjunctivae normal    Pulmonary:  Pulmonary effort is normal  No respiratory distress  GI: abdomen not distended  Musculoskeletal: no gross deformities  Skin:   Skin is not pale  Psychiatric:       Mood normal  Affect congruent    Neurological Exam  Mental Status   Alert and oriented to person, place, and time  Attention is normal  Speech is fluent w/ naming and repetition intact and no dysarthria    Cranial Nerves   CN II Visual fields full to confrontation  CN III, IV, VI PERRL, brisk reactivity and consensual response intact b/l  EOMI w/o CN VI or III palsy  No clear nystagmus; patient does have eyelid fatiguing w/ subtle b/l ptosis after prolonged upgaze L > R; mild ptosis noted by end of office visit  CN V, VII Facial sensation and expression full, symmetric     CN VIII Hearing grossly symmetric  CN IX, X Palate symmetric  CN XI trapezius strength symmetric  CN XII no dysarthria, symmetric lingual protrusion     Motor Exam last pyridostigmine dose 8 am approx 6-7 hr prior to exam  Muscle bulk and tone grossly normal  Pronator drift absent b/l  Strength: R/L     Deltoid:    5/5 and fatiguable to 5-/5-   Biceps:    5-/5-   Triceps:   5/5  Wrist flexion:  4+/4+   Wrist extension: 5-/5-  Iliopsoas: 5-/5-   Quads: 5/5   Hamstrin/5   AT:  5/5  Gastroc: 5/5       Sensory Exam   Light touch, vibration, temperature intact and symmetric     Gait, Coordination, and Reflexes   FTN normal  No tremor observed  Reflexes: R/L      Brachioradialis: 2+/2+   Biceps: 2+/2+     Pateller: 2+/2+     Gait: casual gait with average stance, stride length, arm swing      ROS:    Review of Systems   Constitutional: Positive for fatigue  Negative for appetite change and fever  HENT: Negative  Negative for hearing loss, tinnitus, trouble swallowing and voice change  Eyes: Negative  Negative for photophobia and pain  Respiratory: Negative  Negative for shortness of breath  Cardiovascular: Negative  Negative for palpitations  Gastrointestinal: Negative  Negative for nausea and vomiting  Endocrine: Negative  Negative for cold intolerance  Genitourinary: Negative  Negative for dysuria, frequency and urgency  Musculoskeletal: Negative  Negative for myalgias and neck pain  Skin: Negative  Negative for rash  Allergic/Immunologic: Negative  Neurological: Negative  Negative for dizziness, tremors, seizures, syncope, facial asymmetry, speech difficulty, weakness, light-headedness, numbness and headaches  Hematological: Negative  Does not bruise/bleed easily  Psychiatric/Behavioral: Negative  Negative for confusion, hallucinations and sleep disturbance

## 2022-09-23 NOTE — ASSESSMENT & PLAN NOTE
Alejandra Subramanian is a 25 y o  female who presents today for neuromuscular evaluation for weakness that has been ongoing for ____ and given her lab findings of high titers positive AChR binding/blocking antibodies is most consistent with myasthenia  Has already responded to pyridostigmine which patient takes _____  Workup reviewed:   AChR antibody testing (8/22): binding > 80 (H), blocking 49 (H)  o MuSK negative  o B12 > 500; vitamin D 15 5 (L); Mag 2 2; Cu 77 (L)  o Sjogrens, EDUARDO, RF, ESR negative  o Additional hormonal labs unremarkable   Imaging:   o MRI/MRA brain (2022): stable pituitary enlargement (ddx hyperplasia > > macroadenoma); normal MRA w/o aneurysm  o MRI CS/TS (2022): sm central disc protrusion C5-6 w/ canal narrowing but no abnormal cord signal and no foraminal stenosis; TS normal  o CT chest (2022): large residual thymic tissue consistent with thymic hyperplasia   EMG (RUE/RLE 9/22):  Overall normal w/ v  subtly decreased IP recruitment        Plan:  

## 2022-09-27 ENCOUNTER — TELEMEDICINE (OUTPATIENT)
Dept: PSYCHIATRY | Facility: CLINIC | Age: 22
End: 2022-09-27
Payer: COMMERCIAL

## 2022-09-27 ENCOUNTER — OFFICE VISIT (OUTPATIENT)
Dept: PHYSICAL THERAPY | Facility: CLINIC | Age: 22
End: 2022-09-27
Payer: COMMERCIAL

## 2022-09-27 DIAGNOSIS — R29.6 FALLS FREQUENTLY: ICD-10-CM

## 2022-09-27 DIAGNOSIS — M62.81 MUSCLE WEAKNESS: Primary | ICD-10-CM

## 2022-09-27 DIAGNOSIS — F32.89 OTHER DEPRESSION: Primary | ICD-10-CM

## 2022-09-27 PROCEDURE — 90834 PSYTX W PT 45 MINUTES: CPT

## 2022-09-27 PROCEDURE — 97110 THERAPEUTIC EXERCISES: CPT

## 2022-09-27 NOTE — PROGRESS NOTES
Daily Note     Today's date: 2022  Patient name: Zaida Sosa  : 2000  MRN: [id number]  Referring provider: Edrie Mortimer, PA-C  Dx:   Encounter Diagnosis     ICD-10-CM    1  Muscle weakness  M62 81    2  Falls frequently  R29 6        Start Time: 1331  Stop Time: 1413  Total time in clinic (min): 42 minutes    Subjective: Neurology appointment went well  [place] more about MG  Did not feel too tired/sore after PT last session  Was challenging though  Objective: See treatment diary below      Assessment:  Pt required standing rest after weighted carry, noting most fatigue above her knees  Accumulated fatigue noted with squats at end of session, difficulty with maintaining form at bottom of squat  Shows good form with exercises, some breakdown noted with hip stability with fatigue  Patient would benefit from continued PT to improve muscular strength and endurance  Plan: Continue per plan of care  Re-assessment nv       Precautions: Falls, Hx of depression, caution with stairs  Re-eval Date: 2022    Date         Visit Count 7        FOTO See IE        Pain In See IE        Pain Out                 Testing         TUG         5xSTS 13 81        Gait speed         FGA 22        2/6MWT 1110ft        Neuro Re-Ed         Dynamic balance         Static balance         Obstacle course                                             Ther Ex         SLR x 4 GTB Hip/knee flexion 2x12        Bird dog         Swimmers         Mini Squats 7lb x3 chair tap  x8 chair tap no UE        Bridges 2x12 5'         Step ups 6' 60" x2 lat step        Leg press         Banded lateral steps         Side plank 20" x2 knees        Pallof press x15 ea side 15lb        Weighted carry 40lb 40ft x3                 Hamstring stretch         Hip flexor stretch         Calf stretch         Education         Ther Activity         ADL                  Gait Training         Divided Attention         Head turns Modalities          prn

## 2022-09-27 NOTE — PSYCH
INDIVIDUAL SESSION NOTE     Length of time in session: 50 minutes, follow up in 3 weeks     Session start time: 3:04pm   Session End time: 3:54pm             Psychotherapy Provided: Individual      Diagnosis ICD-10-CM Associated Orders   1  Other depression  F32 89           Goals addressed in session: Goal 1     Pain:      none    0    Current suicide risk:  minimal    Data: Met with Everton Hebert for a scheduled individual session  Topics discussed included emotional wellness, family stressors and physical health concerns  Gracia Narayanan is feeling "good"  She stated now that she's received her diagnosis, she feels validated  The medications are also helping and she has less doctors appointments, which is exciting  Gracia Narayanan feels more settled knowing what is going on with her health now  She feels her parents have been a bit more understanding of her limitations and so on  She did say she and her dad have been arguing more; they work at the same place and Gracia Narayanan said that seems to contribute to the stress  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  Gracia Narayanan said she's been attending physical therapy twice a week to build up her strength but she really enjoys it; she feels like she can see gradual improvement which is encouraging  Gracia Narayanan shows evidence of utilizing effective communication skills and maintaining emotional composure to manage mental health symptoms  During this session, this clinical used the following therapeutic modalities supportive psychotherapy, client-centered therapy and stress management skills  Assessment:  Gracia Narayanan presents with a normal mood  her affect is normal range and intensity, appropriate  Gracia Narayanan was oriented x3  She was focused and engaged  Gracia Narayanna exhibits good therapeutic rapport with this clinician  she continues to exhibit willingness to work on treatment goals and objectives    Gracia Narayanan presents with a minimal risk of suicide, minimal risk of self-harm and minimal of harm to others  Plan:  Yasmin Denis will return in 3 weeks for the next scheduled session  Between sessions, she  will focus on her physical health and attending physical therapy and will report back during the next session re: success and barriers  At the next session, this clinical will use supportive psychotherapy and client-centered therapy to address her depression, in an effort to assist Yasmin Denis with meeting treatment goals  Behavioral Health Treatment Plan [facility]: Diagnosis and Treatment Plan explained to Lola Siddiqui relates understanding diagnosis and is agreeable to Treatment Plan  Yes     Virtual Regular Visit    Verification of patient location:    Patient is located in the following state in which I hold an active license [place]      Assessment/Plan:    Problem List Items Addressed This Visit    None     Visit Diagnoses     Other depression    -  Primary          Goals addressed in session: Goal 1          Reason for visit is   Chief Complaint   Patient presents with    Virtual Regular Visit        Encounter provider Gagandeep Pena LCSW    Provider located at [address] [place] [id number] [phone]      Recent Visits  No visits were found meeting these conditions  Showing recent visits within past 7 days and meeting all other requirements  Future Appointments  No visits were found meeting these conditions  Showing future appointments within next 150 days and meeting all other requirements       The patient was identified by name and date of birth  Yocasta Singh was informed that this is a telemedicine visit and [facility] and patient was informed that this is a secure, HIPAA-compliant platform  She agrees to proceed     My office door was closed  No one else was in the room    She acknowledged consent and understanding of privacy and security of the video platform  The patient has agreed to participate and understands they can discontinue the visit at any time  Patient is aware this is a billable service  Subjective  Elena Jose is a 25 y o  female  HPI     Past Medical History:   Diagnosis Date    Depression     History of migraine     Low vitamin D level     Uses birth control        Past Surgical History:   Procedure Laterality Date    ADENOIDECTOMY      TONSILLECTOMY AND ADENOIDECTOMY         Current Outpatient Medications   Medication Sig Dispense Refill    Baclofen 5 MG TABS Take 1 po HS 30 tablet 5    cholecalciferol (VITAMIN D3) 1,000 units tablet Take 1,000 Units by mouth daily      ergocalciferol (VITAMIN D2) 50,000 units Take 1 capsule (50,000 Units total) by mouth once a week 12 capsule 0    ferrous sulfate 325 (65 Fe) mg tablet Take 325 mg by mouth daily at bedtime      Fiber Complete TABS Take 2 tablets by mouth daily      FLUoxetine (PROzac) 20 MG tablet Take 1 tablet (20 mg total) by mouth daily 30 tablet 1    Levonorgestrel (MIRENA) 20 MCG/DAY IUD 1 each by Intrauterine route once      Magnesium 400 MG CAPS Take 400 mg by mouth daily      ondansetron (Zofran ODT) 4 mg disintegrating tablet Take 1 tablet (4 mg total) by mouth every 6 (six) hours as needed for nausea or vomiting 20 tablet 0    pyridostigmine (Mestinon) 60 mg tablet Take 0 5 tablets (30 mg total) by mouth 3 (three) times a day 45 tablet 2    rizatriptan (Maxalt-MLT) 10 mg disintegrating tablet Take 1 tablet (10 mg total) by mouth as needed for migraine Take at the onset of migraine; if symptoms continue or return, may take another dose at least 2 hours after first dose  Take no more than 2 doses in a day  18 tablet 0    topiramate (TOPAMAX) 25 mg tablet 2 tab po qhs 180 tablet 3     No current facility-administered medications for this visit          Allergies   Allergen Reactions    Augmentin [Amoxicillin-Pot Clavulanate] Diarrhea    Fish-Derived Products - Food Allergy Vomiting       Review of Systems    Video Exam    There were no vitals filed for this visit      Physical Exam     I spent 50 minutes directly with the patient during this visit

## 2022-09-29 ENCOUNTER — OFFICE VISIT (OUTPATIENT)
Dept: PHYSICAL THERAPY | Facility: CLINIC | Age: 22
End: 2022-09-29
Payer: COMMERCIAL

## 2022-09-29 DIAGNOSIS — M62.81 MUSCLE WEAKNESS: Primary | ICD-10-CM

## 2022-09-29 DIAGNOSIS — R29.6 FALLS FREQUENTLY: ICD-10-CM

## 2022-09-29 PROCEDURE — 97112 NEUROMUSCULAR REEDUCATION: CPT

## 2022-09-29 PROCEDURE — 97110 THERAPEUTIC EXERCISES: CPT

## 2022-09-29 NOTE — PROGRESS NOTES
PT Re-Evaluation     Today's date: 2022  Patient name: Tanner Schumacher  : 2000  MRN: [id number]  Referring provider: Patience Mcdowell PA-C  Dx:   Encounter Diagnosis     ICD-10-CM    1  Muscle weakness  M62 81    2  Falls frequently  R29 6        Start Time: 1546  Stop Time: 1630  Total time in clinic (min): 44 minutes    Assessment  Assessment details: Patient is a 25y o  year old female presenting to OPPT s/p diagnosis of muscular weakness and frequent falls  Leticia Hsieh was recently diagnosed with Myasthenia Gravis  She was placed on new medication which she notes has helped her fatigue significantly  She also feels like she has noticed gains with her strength and function  Her 6MWT has improved by nearly 200ft and she demonstrates improved short duration power in the 5xSTS  She still demonstrates limitations with carrying heavier items at work and endurance work due to her diagnosis  Pt will continue to benefit from skilled PT intervention to improve strength and function  Continue per plan of care  Impairments: abnormal gait, activity intolerance, impaired balance, impaired physical strength, lacks appropriate home exercise program and safety issue     Prognosis: good    Goals  In 4 weeks, patient will:  1  Report improved confidence with negotiating curbs and flat room environment - met  2  Demonstrate less muscular fatigue during her travel to work to assist with QoL - met  3  Improve 5xSTS by at least 2 seconds to show improved LE strength and stability to assist with transfers  In 4-10 weeks, patient will:  1  Meet FOTO predicted score to demonstrate improvement in functional mobility  2   Demonstrate consistent carryover with HEP and walking program   3   Improve BLE strength by at least 1 point in each muscle group  4  Improve 6MWT by at least 100ft to demonstrate improvement in ability in community ambulation  - met  5    Report improved confidence with stair negotiation and safety awareness with fatigue  - progressing  6   Reduce number of falls while negotiating stairs than previous months to reduce risk of injury  - progressing  7  Improve FGA to above cutoff risk to reduce risk of falls  9/29 updated goals:  1  Improve 60second chair stand by at least 4 stands to show improved muscular endurance  2  Carry heavier items for at least 150ft distances to assist with work tasks  Plan  Patient would benefit from: skilled physical therapy  Planned therapy interventions: neuromuscular re-education, manual therapy, patient education, home exercise program, therapeutic exercise, therapeutic activities and gait training  Frequency: 2x week  Duration in weeks: 8  Plan of Care beginning date: 8/22/2022  Plan of Care expiration date: 10/21/2022  Treatment plan discussed with: patient        Subjective Evaluation    History of Present Illness  Mechanism of injury: 9/29: Less falls  No change in meds  Saw neuro and had EMG  Less fatigued overall  Going to see cardiothoracic sx for thymoma  Present at PT: issues started in feb/march  Started when she was at [facility] and having to do stairs a lot  Never had issues prior  Stairs and curbs have been rough  Knees give out, mostly her R knee however feels like it switches  Notes fatigue  Needs to save energy to perform stairs at end of the day  1st fall was coming down stairs to go to her boyfriends house  Was carrying a lot of stuff  [place] her knee go out  Most recent fall  Was getting out of bed, less impact makes her fall    A lot of sitting  Will stand at [employer]  Tote bag during travel to work  Has to walk 3 blocks to get to work  Will feel fatigued walking to work depending on the day  Will feel it in her legs more than anything  Feels like she has tremoring  Last night had n/t in all her R fingers  Lasted 10min, neck felt stiff  Her and her mom have discussed the thought of MS   Has had imaging in the past which is clear, however, they are planning on finishing the CNS with thoracolumbar imaging  Pain  Current pain ratin  At best pain ratin  At worst pain ratin  Location: Low back and R knee  Social Support  Stairs in house: yes   Lives in: multiple-level home      Diagnostic Tests  MRI studies: abnormal  Treatments  Previous treatment: physical therapy  Patient Goals  Patient goals for therapy: increased strength and improved balance  Patient goal: Feel more confident walking and moving around          Objective     Neurological Testing     Sensation     Lumbar   Left   Intact: light touch    Right   Intact: light touch    Reflexes   Left   Biceps (C5/C6): normal (2+)  Triceps (C7): trace (1+)  Patellar (L4): trace (1+)  Achilles (S1): trace (1+)  Clonus sign: negative    Right   Biceps (C5/C6): normal (2+)  Triceps (C7): trace (1+)  Patellar (L4): trace (1+)  Achilles (S1): trace (1+)  Clonus sign: negative    Strength/Myotome Testing     Left Hip   Planes of Motion   Flexion: 4  Extension: 4-  Abduction: 4-    Right Hip   Planes of Motion   Flexion: 4  Extension: 4-  Abduction: 4-    Left Knee   Flexion: 4-  Extension: 4    Right Knee   Flexion: 4-  Extension: 4    Left Ankle/Foot   Dorsiflexion: 4-  Plantar flexion: 4+    Right Ankle/Foot   Dorsiflexion: 4-  Plantar flexion: 4+  Neuro Exam:     Sensation   Light touch LE: left WNL and right WNL    Coordination   Rapid alternating movements: UE WNL and LE impaired     Functional outcomes   6 minute walk test: 1286ft  5x sit to stand: 11 (seconds)  Functional outcome comment: FGA 22  60 seconds chair stand: 10 stands             Precautions: Falls, Hx of depression, caution with stairs  Re-eval Date: 2022    Date        Visit Count 7 8       FOTO See IE        Pain In See IE        Pain Out                 Testing         60s chair stand 10        5xSTS 11        Gait speed         FGA 22        2/6MWT 1286ft        Neuro Re-Ed  6MWT, 5xSTS, 60s chair stand       Dynamic balance         Static balance         Obstacle course                                             Ther Ex         SLR x 4 GTB Hip/knee flexion 2x12        Bird dog  1x10, 1x8       Swimmers         Mini Squats 7lb x3 chair tap  x8 chair tap no UE 3' isometric hold mini squat 3x8       Bridges 2x12 5'         Step ups 6' 60" x2 lat step        Leg press         Banded lateral steps  35lb cbl machine 2x10       Side plank 20" x2 knees        Pallof press x15 ea side 15lb        Weighted carry 40lb 40ft x3 Suitcase carry 30lb 24ft x2 ea side                Hamstring stretch         Hip flexor stretch         Calf stretch         Education         Ther Activity         ADL                  Gait Training         Divided Attention         Head turns         Modalities          prn

## 2022-10-05 ENCOUNTER — APPOINTMENT (OUTPATIENT)
Dept: PHYSICAL THERAPY | Facility: CLINIC | Age: 22
End: 2022-10-05

## 2022-10-06 ENCOUNTER — OFFICE VISIT (OUTPATIENT)
Dept: PHYSICAL THERAPY | Facility: CLINIC | Age: 22
End: 2022-10-06
Payer: COMMERCIAL

## 2022-10-06 DIAGNOSIS — R29.6 FALLS FREQUENTLY: ICD-10-CM

## 2022-10-06 DIAGNOSIS — M62.81 MUSCLE WEAKNESS: Primary | ICD-10-CM

## 2022-10-06 PROCEDURE — 97110 THERAPEUTIC EXERCISES: CPT

## 2022-10-06 NOTE — PROGRESS NOTES
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  Was standing for periods of a time, a lot of sitting as well  Not sure if the cold has anything to do with it  It has progressively gotten better  Feels overall fatigued yet  Had some drinks over the weekend, had worst hangover she has experienced next morning  Didn't feel like she over did it and didn't have any balance problems during  Objective: See treatment diary below      Assessment: Reviewed HEP with patient discussed importance of keeping up only when her fatigue is not overwhelming and ensuring saving enough energy for ADLs  Requires cues for hip position during RDLs as she tends to open her hips and keep knee locked in extension  Otherwise shows good form with exercises and addition of rows for shoulder girdle strengthening  Patient would benefit from continued PT to improve core strength and functional mobility + strength  Plan: Continue per plan of care  core strength  Hip/shoulder girdle       Precautions: Falls, Hx of depression, caution with stairs  Re-eval Date: 2022    Date 9/27 9/29 10/6      Visit Count 7 8 9      FOTO See IE        Pain In See IE        Pain Out                 Testing         60s chair stand 10        5xSTS 11        Gait speed         FGA 22        2/6MWT 1286ft        Neuro Re-Ed  6MWT, 5xSTS, 60s chair stand       Dynamic balance         Static balance         Obstacle course                                             Ther Ex         SLR x 4 GTB Hip/knee flexion 2x12        Bird dog  1x10, 1x8       Swimmers         Mini Squats 7lb x3 chair tap  x8 chair tap no UE 3' isometric hold mini squat 3x8 3' isometric hold mini squat 3x8      Bridges 2x12 5'         Step ups 6' 60" x2 lat step  4' 60" x2 lat step with 10lb carry      Leg press         Banded lateral steps  35lb cbl machine 2x10       Side plank 20" x2 knees  20" x2 knees      Pallof press x15 ea side 15lb        Weighted carry 40lb 40ft x3 Suitcase carry 30lb 24ft x2 ea side 40lb carry 40ft x4      SL RDL   UE assist 2x8      Rows   35lb 3x8      Hamstring stretch         Hip flexor stretch         Calf stretch         Education         Ther Activity         ADL                  Gait Training         Divided Attention         Head turns         Modalities          prn

## 2022-10-11 ENCOUNTER — OFFICE VISIT (OUTPATIENT)
Dept: PHYSICAL THERAPY | Facility: CLINIC | Age: 22
End: 2022-10-11
Payer: COMMERCIAL

## 2022-10-11 DIAGNOSIS — M62.81 MUSCLE WEAKNESS: Primary | ICD-10-CM

## 2022-10-11 DIAGNOSIS — R29.6 FALLS FREQUENTLY: ICD-10-CM

## 2022-10-11 PROCEDURE — 97110 THERAPEUTIC EXERCISES: CPT

## 2022-10-11 NOTE — PROGRESS NOTES
Daily Note     Today's date: 10/11/2022  Patient name: Elena Jose  : 2000  MRN: [id number]  Referring provider: Harris Harris PA-C  Dx:   Encounter Diagnosis     ICD-10-CM    1  Muscle weakness  M62 81    2  Falls frequently  R29 6        Start Time: 1724  Stop Time: 1809  Total time in clinic (min): 45 minutes    Subjective: Has had a few good days in a row  Carried tea up the stairs without having to think twice about it and she is excited about it  Not feeling very fatigued  Objective: See treatment diary below      Assessment: Pt tolerates strengthening program well despite program being performed at the end of the day  Incorporated more UE to strengthen scapula and shoulder girdle  Additional LE exercises with increasing weight tolerated well  Patient would benefit from continued PT to improve core strength and functional mobility + strength  Plan: Continue per plan of care  core strength  Hip/shoulder girdle       Precautions: Falls, Hx of depression, caution with stairs  Re-eval Date: 10/22/2022    Date 9/27 9/29 10/6 10/11     Visit Count 7 8 9 10     FOTO See IE        Pain In See IE        Pain Out                 Testing         60s chair stand 10        5xSTS 11        Gait speed         FGA 22        2/6MWT 1286ft        Neuro Re-Ed  6MWT, 5xSTS, 60s chair stand       Dynamic balance         Static balance         Obstacle course                                             Ther Ex         SLR x 4 GTB Hip/knee flexion 2x12        Bird dog  1x10, 1x8       Swimmers         Mini Squats 7lb x3 chair tap  x8 chair tap no UE 3' isometric hold mini squat 3x8 3' isometric hold mini squat 3x8      Bridges 2x12 5'         Step ups 6' 60" x2 lat step  4' 60" x2 lat step with 10lb carry SLS 1x12, 1x8 8'     Squat into [place] press    3x12 6lb BL     Leg press    85lb 3x12     Banded lateral steps  35lb cbl machine 2x10  35lb cbl machine 2x12     Side plank 20" x2 knees  20" x2 knees      Pallof press x15 ea side 15lb        Weighted carry 40lb 40ft x3 Suitcase carry 30lb 24ft x2 ea side 40lb carry 40ft x4 45lb 40ft x2     SL RDL   UE assist 2x8 UE assist 2x10     Rows   35lb 3x8 35lb 3x8     Hamstring stretch         Hip flexor stretch         Calf stretch         Education         Ther Activity         ADL                  Gait Training         Divided Attention         Head turns         Modalities          prn

## 2022-10-13 ENCOUNTER — OFFICE VISIT (OUTPATIENT)
Dept: PHYSICAL THERAPY | Facility: CLINIC | Age: 22
End: 2022-10-13
Payer: COMMERCIAL

## 2022-10-13 DIAGNOSIS — M62.81 MUSCLE WEAKNESS: Primary | ICD-10-CM

## 2022-10-13 DIAGNOSIS — R29.6 FALLS FREQUENTLY: ICD-10-CM

## 2022-10-13 PROCEDURE — 97110 THERAPEUTIC EXERCISES: CPT

## 2022-10-13 NOTE — PROGRESS NOTES
Daily Note     Today's date: 10/13/2022  Patient name: Lynda Kelly  : 2000  MRN: [id number]  Referring provider: Colleen Reyes PA-C  Dx:   Encounter Diagnosis     ICD-10-CM    1  Muscle weakness  M62 81    2  Falls frequently  R29 6        Start Time: 1554  Stop Time: 1628  Total time in clinic (min): 34 minutes    Subjective: Reports more fatigue, may be from weather  Not worried about falling  Objective: See treatment diary below      Assessment: Modified session as needed to patient fatigue level  Added more core strengthening  Performed UE exercises when she noted her legs were becoming too fatigued  Pt showed good response to treatment  Feels slightly fatigued but not too much  Patient would benefit from continued PT to improve core strength and functional mobility + strength  Plan: Continue per plan of care  core strength  Hip/shoulder girdle       Precautions: Falls, Hx of depression, caution with stairs  Re-eval Date: 10/22/2022    Date 9/27 9/29 10/6 10/11 10/13    Visit Count 7 8 9 10 11    FOTO See IE        Pain In See IE        Pain Out                 Testing         60s chair stand 10        5xSTS 11        Gait speed         FGA 22        2/6MWT 1286ft        Neuro Re-Ed  6MWT, 5xSTS, 60s chair stand       Dynamic balance         Static balance         Obstacle course                                             Ther Ex         SLR x 4 GTB Hip/knee flexion 2x12        Mod plantigrade shoulder tap + reach     1min x2    Bird dog  1x10, 1x8       Swimmers     2x12    Mini Squats 7lb x3 chair tap  x8 chair tap no UE 3' isometric hold mini squat 3x8 3' isometric hold mini squat 3x8      Bridges 2x12 5'         Step ups 6' 60" x2 lat step  4' 60" x2 lat step with 10lb carry SLS 1x12, 1x8 8'     Squat into [facility] press    3x12 6lb BL     Leg press    85lb 3x12 95lb 3x12    Banded lateral steps  35lb cbl machine 2x10  35lb cbl machine 2x12 35lb cbl machine 2x12    Side plank 20" x2 knees 20" x2 knees      Pallof press x15 ea side 15lb        Weighted carry 40lb 40ft x3 Suitcase carry 30lb 24ft x2 ea side 40lb carry 40ft x4 45lb 40ft x2     SL RDL   UE assist 2x8 UE assist 2x10     Suitcase deadlift     8lb 2x10 L side, 1x10 R side    Rows   35lb 3x8 35lb 3x8 35lb 3x8    OH press     6lb 3x12 BL    Hamstring stretch         Hip flexor stretch         Calf stretch         Education         Ther Activity         ADL                  Gait Training         Divided Attention         Head turns         Modalities          prn

## 2022-10-18 ENCOUNTER — OFFICE VISIT (OUTPATIENT)
Dept: PHYSICAL THERAPY | Facility: CLINIC | Age: 22
End: 2022-10-18
Payer: COMMERCIAL

## 2022-10-18 ENCOUNTER — TELEMEDICINE (OUTPATIENT)
Dept: PSYCHIATRY | Facility: CLINIC | Age: 22
End: 2022-10-18
Payer: COMMERCIAL

## 2022-10-18 DIAGNOSIS — R29.6 FALLS FREQUENTLY: ICD-10-CM

## 2022-10-18 DIAGNOSIS — M62.81 MUSCLE WEAKNESS: Primary | ICD-10-CM

## 2022-10-18 DIAGNOSIS — F32.89 OTHER DEPRESSION: Primary | ICD-10-CM

## 2022-10-18 PROCEDURE — 90834 PSYTX W PT 45 MINUTES: CPT

## 2022-10-18 PROCEDURE — 97110 THERAPEUTIC EXERCISES: CPT

## 2022-10-18 NOTE — PROGRESS NOTES
Daily Note     Today's date: 10/18/2022  Patient name: Yocasta Singh  : 2000  MRN: [id number]  Referring provider: Misty Montejo PA-C  Dx:   Encounter Diagnosis     ICD-10-CM    1  Muscle weakness  M62 81    2  Falls frequently  R29 6        Start Time: 1718  Stop Time: 1750  Total time in clinic (min): 32 minutes    Subjective: Pt was fatigued the day after therapy, work was rough to get through  Recovered over the weekend  Feels okay today, a little tired  Objective: See treatment diary below      Assessment: More cautious exercise programming due to inc fatigue last session  Reduced exercise dosing by sets/reps where fit  Tolerated session well and reported start of fatigue beginning to come on at end of session, PT ended session early to avoid over-fatigue  Patient would benefit from continued PT to improve core strength and functional mobility + strength  Plan: Continue per plan of care  core strength  Hip/shoulder girdle       Precautions: Falls, Hx of depression, caution with stairs  Re-eval Date: 10/22/2022    Date 9/27 9/29 10/6 10/11 10/13 10/18   Visit Count 7 8 9 10 11 12   FOTO See IE        Pain In See IE        Pain Out                 Testing         60s chair stand 10        5xSTS 11        Gait speed         FGA 22        2/6MWT 1286ft        Neuro Re-Ed  6MWT, 5xSTS, 60s chair stand       Dynamic balance         Static balance         Obstacle course                                             Ther Ex         SLR x 4 GTB Hip/knee flexion 2x12        Mod plantigrade shoulder tap + reach     1min x2    Bird dog  1x10, 1x8       Swimmers     2x12    Mini Squats 7lb x3 chair tap  x8 chair tap no UE 3' isometric hold mini squat 3x8 3' isometric hold mini squat 3x8   3' 2x10   Bridges 2x12 5'         Step ups 6' 60" x2 lat step  4' 60" x2 lat step with 10lb carry SLS 1x12, 1x8 8'  8' step up 30" x3 lat steps   Squat into [place] press    3x12 6lb BL     Leg press    85lb 3x12 95lb 3x12 95 3x12   Banded lateral steps  35lb cbl machine 2x10  35lb cbl machine 2x12 35lb cbl machine 2x12    Side plank 20" x2 knees  20" x2 knees      Pallof press x15 ea side 15lb     Blue TB w chop 2x8 ea side   Weighted carry 40lb 40ft x3 Suitcase carry 30lb 24ft x2 ea side 40lb carry 40ft x4 45lb 40ft x2     SL RDL   UE assist 2x8 UE assist 2x10  UE assist 2x10   Suitcase deadlift     8lb 2x10 L side, 1x10 R side    Rows   35lb 3x8 35lb 3x8 35lb 3x8 GTB 2x10   OH press     6lb 3x12 BL 4lb 2x12   Hamstring stretch         Hip flexor stretch         Calf stretch         Education         Ther Activity         ADL                  Gait Training         Divided Attention         Head turns         Modalities          prn

## 2022-10-18 NOTE — PSYCH
INDIVIDUAL SESSION NOTE     Length of time in session: 45 minutes, follow up in 2 weeks     Psychotherapy Provided: Individual      Diagnosis ICD-10-CM Associated Orders   1  Other depression  F32 89           Goals addressed in session: Goal 1     Pain:      none    0    Current suicide risk:  minimal    Data: Met with Toney Vinson for a scheduled individual session  Topics discussed included emotional wellness, relationships with family, work-related stress and physical health concerns  Addie Crockett is feeling "ok"  She's been keeping very busy recently  Her mobility has been better  She did have an extra hard therapy session and had a lot of trouble with pain afterward  She's tried to make time to rest and recover  Addie Crockett is happy she's been busy with friends and her boyfriend but she also knows she's got to slow down and take breaks  Her family relationships are still up and down  She feels her family understands her physical diagnosis but often get frustrated with it and the limitations it puts on [place] Notice  She does well sticking up for herself  She likes being busy because she has less time at home  Addie Crockett shows evidence of utilizing effective communication skills and maintaining emotional composure to manage mental health symptoms  During this session, this clinical used the following therapeutic modalities supportive psychotherapy, client-centered therapy and stress management skills  Assessment:  Addie Crockett presents with a normal mood  her affect is normal range and intensity, appropriate  [place] Notice was oriented x3  She was focused and engaged  [place] Crockett exhibits good therapeutic rapport with this clinician  she continues to exhibit willingness to work on treatment goals and objectives  Addie Crockett presents with a minimal risk of suicide, minimal risk of self-harm and minimal of harm to others  Plan:  Addie Crockett will return in 2 weeks for the next scheduled session    At the next session, this clinical will use supportive psychotherapy and client-centered therapy to address her depression, in an effort to assist Jn Manley with meeting treatment goals  Behavioral Health Treatment Plan [facility]: Diagnosis and Treatment Plan explained to Ramya Rothman relates understanding diagnosis and is agreeable to Treatment Plan  Yes     Virtual Regular Visit    Verification of patient location:    Patient is located in the following state in which I hold an active license [place]      Assessment/Plan:    Problem List Items Addressed This Visit    None     Visit Diagnoses     Other depression    -  Primary          Goals addressed in session: Goal 1          Reason for visit is   Chief Complaint   Patient presents with   • Virtual Regular Visit        Encounter provider Kiara Samuels LCSW    Provider located at [address] [place] [id number] [phone]      Recent Visits  No visits were found meeting these conditions  Showing recent visits within past 7 days and meeting all other requirements  Future Appointments  No visits were found meeting these conditions  Showing future appointments within next 150 days and meeting all other requirements       The patient was identified by name and date of birth  Soledad Dsouza was informed that this is a telemedicine visit and [facility] and patient was informed this is a secure, HIPAA-complaint platform  She agrees to proceed     My office door was closed  No one else was in the room  She acknowledged consent and understanding of privacy and security of the video platform  The patient has agreed to participate and understands they can discontinue the visit at any time  Patient is aware this is a billable service  Subjective  Soledad Dsouza is a 25 y o  female        HPI     Past Medical History:   Diagnosis Date   • Depression    • History of migraine • Low vitamin D level    • Uses birth control        Past Surgical History:   Procedure Laterality Date   • ADENOIDECTOMY     • TONSILLECTOMY AND ADENOIDECTOMY         Current Outpatient Medications   Medication Sig Dispense Refill   • Baclofen 5 MG TABS Take 1 po HS 30 tablet 5   • cholecalciferol (VITAMIN D3) 1,000 units tablet Take 1,000 Units by mouth daily     • ergocalciferol (VITAMIN D2) 50,000 units Take 1 capsule (50,000 Units total) by mouth once a week 12 capsule 0   • ferrous sulfate 325 (65 Fe) mg tablet Take 325 mg by mouth daily at bedtime     • Fiber Complete TABS Take 2 tablets by mouth daily     • FLUoxetine (PROzac) 20 MG tablet Take 1 tablet (20 mg total) by mouth daily 30 tablet 1   • Levonorgestrel (MIRENA) 20 MCG/DAY IUD 1 each by Intrauterine route once     • Magnesium 400 MG CAPS Take 400 mg by mouth daily     • ondansetron (Zofran ODT) 4 mg disintegrating tablet Take 1 tablet (4 mg total) by mouth every 6 (six) hours as needed for nausea or vomiting 20 tablet 0   • pyridostigmine (Mestinon) 60 mg tablet Take 0 5 tablets (30 mg total) by mouth 3 (three) times a day 45 tablet 2   • rizatriptan (Maxalt-MLT) 10 mg disintegrating tablet Take 1 tablet (10 mg total) by mouth as needed for migraine Take at the onset of migraine; if symptoms continue or return, may take another dose at least 2 hours after first dose  Take no more than 2 doses in a day  18 tablet 0   • topiramate (TOPAMAX) 25 mg tablet 2 tab po qhs 180 tablet 3     No current facility-administered medications for this visit  Allergies   Allergen Reactions   • Augmentin [Amoxicillin-Pot Clavulanate] Diarrhea   • Fish-Derived Products - Food Allergy Vomiting       Review of Systems    Video Exam    There were no vitals filed for this visit      Physical Exam     Visit Time    Visit Start Time: 3:06 PM (client was running behind)  Visit Stop Time: 3:51 PM  Total Visit Duration: 45 minutes

## 2022-10-20 ENCOUNTER — APPOINTMENT (OUTPATIENT)
Dept: PHYSICAL THERAPY | Facility: CLINIC | Age: 22
End: 2022-10-20

## 2022-10-25 ENCOUNTER — OFFICE VISIT (OUTPATIENT)
Dept: PHYSICAL THERAPY | Facility: CLINIC | Age: 22
End: 2022-10-25
Payer: COMMERCIAL

## 2022-10-25 DIAGNOSIS — R29.6 FALLS FREQUENTLY: ICD-10-CM

## 2022-10-25 DIAGNOSIS — M62.81 MUSCLE WEAKNESS: Primary | ICD-10-CM

## 2022-10-25 PROCEDURE — 97110 THERAPEUTIC EXERCISES: CPT

## 2022-10-25 PROCEDURE — 97112 NEUROMUSCULAR REEDUCATION: CPT

## 2022-10-25 NOTE — PROGRESS NOTES
PT Re-Evaluation     Today's date: 10/25/2022  Patient name: Romulo Velazquez  : 2000  MRN: [id number]  Referring provider: Mavis Haq PA-C  Dx:   Encounter Diagnosis     ICD-10-CM    1  Muscle weakness  M62 81    2  Falls frequently  R29 6        Start Time: 1330  Stop Time: 1415  Total time in clinic (min): 45 minutes    Assessment  Assessment details: Patient is a 25y o  year old female presenting to OPPT s/p diagnosis of muscular weakness and frequent falls  Fabian Urias has made steady gains with her muscular endurance and community ambulation endurance over the past month  She has reported improved confidence with her mobility and less fatigue as well  She has noted reduction in falls as well  She will benefit from skilled follow up every 2 weeks to ensure progression  Updated goals and all progressing goals remain applicable  Updated HEP with increasing theraband resistance  Continue per plan of care  Impairments: abnormal gait, activity intolerance, impaired balance, impaired physical strength, lacks appropriate home exercise program and safety issue     Prognosis: good    Goals  In 4 weeks, patient will:  1  Report improved confidence with negotiating curbs and flat room environment - met  2  Demonstrate less muscular fatigue during her travel to work to assist with QoL - met  3  Improve 5xSTS by at least 2 seconds to show improved LE strength and stability to assist with transfers  - progressing    In 4-10 weeks, patient will:  1  Meet FOTO predicted score to demonstrate improvement in functional mobility  2   Demonstrate consistent carryover with HEP and walking program  - met  3  Improve BLE strength by at least 1 point in each muscle group - progressing  4  Improve 6MWT by at least 100ft to demonstrate improvement in ability in community ambulation  - met  5  Report improved confidence with stair negotiation and safety awareness with fatigue  - met  6    Reduce number of falls while negotiating stairs than previous months to reduce risk of injury  - met  7  Improve FGA to above cutoff risk to reduce risk of falls  - met    9/29 updated goals:  1  Improve 60second chair stand by at least 4 stands to show improved muscular endurance  - nearly met  2  Carry heavier items for at least 150ft distances to assist with work tasks  -met    In 4 weeks patient will:  1  Improve gait speed by at least 0 10 m/s to assist with safety crossing a crosswalk  2  Improve 60s chair stand test by at least 3 more stands to show improved muscular power and endurance      In 8 weeks patient will:  1  Independent with final HEP   2  Improve 6MWT by at least 100 more feet from last re-assessment to show improved community ambulation endurance    Plan  Patient would benefit from: skilled physical therapy  Planned therapy interventions: neuromuscular re-education, manual therapy, patient education, home exercise program, therapeutic exercise, therapeutic activities and gait training  Frequency: 1x every 2 weeks  Duration in weeks: 8  Plan of Care beginning date: 10/25/2022  Plan of Care expiration date: 12/30/2022  Treatment plan discussed with: patient        Subjective Evaluation    History of Present Illness  Mechanism of injury: 10/25: Pt feeling good  Feels like she is in a much better place overall  No falls  Went up stairs while holding cup of tea without spilling or placing it on the ground  Doing more at home and doing more activities  9/29: Less falls  No change in meds  Saw neuro and had EMG  Less fatigued overall  Going to see cardiothoracic sx for thymoma  Present at PT: issues started in feb/march  Started when she was at [facility] and having to do stairs a lot  Never had issues prior  Stairs and curbs have been rough  Knees give out, mostly her R knee however feels like it switches  Notes fatigue  Needs to save energy to perform stairs at end of the day       1st fall was coming down stairs to go to her boyfriends house  Was carrying a lot of stuff  Kinsey her knee go out  Most recent fall  Was getting out of bed, less impact makes her fall    A lot of sitting  Will stand at [facility] Aid  Tote bag during travel to work  Has to walk 3 blocks to get to work  Will feel fatigued walking to work depending on the day  Will feel it in her legs more than anything  Feels like she has tremoring  Last night had n/t in all her R fingers  Lasted 10min, neck felt stiff  Her and her mom have discussed the thought of MS  Has had imaging in the past which is clear, however, they are planning on finishing the CNS with thoracolumbar imaging  Pain  Current pain ratin  At best pain ratin  At worst pain ratin  Location: Low back and R knee  Social Support  Stairs in house: yes   Lives in: multiple-level home      Diagnostic Tests  MRI studies: abnormal  Treatments  Previous treatment: physical therapy  Patient Goals  Patient goals for therapy: increased strength and improved balance  Patient goal: Feel more confident walking and moving around          Objective     Neurological Testing     Sensation     Lumbar   Left   Intact: light touch    Right   Intact: light touch    Reflexes   Left   Biceps (C5/C6): normal (2+)  Triceps (C7): trace (1+)  Patellar (L4): trace (1+)  Achilles (S1): trace (1+)  Clonus sign: negative    Right   Biceps (C5/C6): normal (2+)  Triceps (C7): trace (1+)  Patellar (L4): trace (1+)  Achilles (S1): trace (1+)  Clonus sign: negative    Strength/Myotome Testing     Left Hip   Planes of Motion   Flexion: 4  Extension: 4-  Abduction: 4-    Right Hip   Planes of Motion   Flexion: 4  Extension: 4-  Abduction: 4-    Left Knee   Flexion: 4-  Extension: 4    Right Knee   Flexion: 4-  Extension: 4    Left Ankle/Foot   Dorsiflexion: 4-  Plantar flexion: 4+    Right Ankle/Foot   Dorsiflexion: 4-  Plantar flexion: 4+  Neuro Exam:     Sensation   Light touch LE: left WNL and right WNL    Coordination   Rapid alternating movements: UE WNL and LE impaired     Functional outcomes   6 minute walk test: 1356ft  5x sit to stand: 10 87 (seconds)  Functional outcome comment: FGA 29  60 seconds chair stand: 13 stands  Gait speed: 1 08m/s           Precautions: Falls, Hx of depression, caution with stairs  Re-eval Date: 12/30/2022    Date 10/25        Visit Count 13        FOTO See IE        Pain In See IE        Pain Out                 Testing         60s chair stand 13        5xSTS 10 87        Gait speed         FGA 29        2/6MWT 1356ft        Neuro Re-Ed 6MWT, 5xSTS, 60s chair stand, 10mWT        Dynamic balance         Static balance         Obstacle course                                             Ther Ex         SLR x 4         Mod plantigrade shoulder tap + reach         [employer] dog         Swimmers         Mini Squats         Bridges         Step ups         Squat into OH press         Leg press         Banded lateral steps PTB 20ft x4        Side plank 20" x2 knees        Pallof press         Weighted carry         SL RDL         Suitcase deadlift         W's Blue TB 2x10        Bench press 4lb 3x10        Rows         OH press         Hamstring stretch         Hip flexor stretch         Calf stretch         Education         Ther Activity         ADL                  Gait Training         Divided Attention         Head turns         Modalities          prn

## 2022-10-26 ENCOUNTER — OFFICE VISIT (OUTPATIENT)
Dept: CARDIAC SURGERY | Facility: CLINIC | Age: 22
End: 2022-10-26
Payer: COMMERCIAL

## 2022-10-26 VITALS
WEIGHT: 188.71 LBS | TEMPERATURE: 98.1 F | HEART RATE: 80 BPM | OXYGEN SATURATION: 98 % | SYSTOLIC BLOOD PRESSURE: 118 MMHG | BODY MASS INDEX: 29.62 KG/M2 | HEIGHT: 67 IN | DIASTOLIC BLOOD PRESSURE: 78 MMHG | RESPIRATION RATE: 17 BRPM

## 2022-10-26 DIAGNOSIS — M62.81 MUSCLE WEAKNESS: ICD-10-CM

## 2022-10-26 PROCEDURE — 99245 OFF/OP CONSLTJ NEW/EST HI 55: CPT | Performed by: THORACIC SURGERY (CARDIOTHORACIC VASCULAR SURGERY)

## 2022-10-26 RX ORDER — ACETAMINOPHEN 325 MG/1
975 TABLET ORAL ONCE
OUTPATIENT
Start: 2022-10-26 | End: 2022-10-26

## 2022-10-26 RX ORDER — GABAPENTIN 300 MG/1
600 CAPSULE ORAL ONCE
OUTPATIENT
Start: 2022-10-26 | End: 2022-10-26

## 2022-10-26 RX ORDER — HEPARIN SODIUM 5000 [USP'U]/ML
5000 INJECTION, SOLUTION INTRAVENOUS; SUBCUTANEOUS
OUTPATIENT
Start: 2022-10-27 | End: 2022-10-28

## 2022-10-26 RX ORDER — CEFAZOLIN SODIUM 2 G/50ML
2000 SOLUTION INTRAVENOUS ONCE
OUTPATIENT
Start: 2022-10-26 | End: 2022-10-26

## 2022-10-26 NOTE — PROGRESS NOTES
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  She will return to our office within 30 days of procedure for an updated H and P  Consent was signed today  The procedure, possible risks, and post operative course were explained and all questions were answered  She will undergo blood work prior to the procedure  We will schedule for 12/16/22  Diagnoses and all orders for this visit:    Myasthenia gravis  -     Ambulatory Referral to Thoracic Surgery  -     Case request operating room: THYMECTOMY THORACOSCOPIC W/ ROBOTICS, BRONCHOSCOPY FLEXIBLE; Standing  -     Type and screen; Future  -     CBC and Platelet; Future  -     Basic metabolic panel; Future  -     Case request operating room: THYMECTOMY THORACOSCOPIC W/ ROBOTICS, BRONCHOSCOPY FLEXIBLE    Other orders  -     Diet NPO; Sips with meds; Standing  -     Nursing communication Please give pre-op Carbohydrate drink to patient 2-4 hours prior to surgery; Standing  -     Void on call to OR; Standing  -     Insert peripheral IV; Standing  -     Place sequential compression device; Standing  -     heparin (porcine) subcutaneous injection 5,000 Units  -     acetaminophen (TYLENOL) tablet 975 mg  -     gabapentin (NEURONTIN) capsule 600 mg  -     ceFAZolin (ANCEF) IVPB (premix in dextrose) 2,000 mg 50 mL             Thoracic History   Diagnosis: Anterior mediastinal mass    Procedures/Surgeries:    Pathology:    Adjuvant Therapy:          Patient ID: Efren Brownlee is a 25 y o  female  ECOG 0   HPI      Ms Cleveland Corbett is a 26 yo female with a history of Myasthenia gravis, migraines, JEANNETTE,  and anemia who was referred to our office by Dr Katia Fisher for a mediastinal mass who was placed on Mestinon  CT scan of the chest from 9/13/22 revealed residual thymic tissue measuring 3 8 x 2 6 x 4 7 cm, without any discrete rounded mass, cystic changes, or calcifications to suggest underlying thymoma  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  She also has a 1 1 cm hypodense nodule in the right thyroid  There is also a 1 9 cm cyst at the level of the suprasternal notch, just left of the midline  At this point, she was referred to our office for further evaluation  On discussion, she is not on any blood thinners and has had no previous chest or abdominal surgeries  She was diagnosed with MG in August and started with symptoms in March 2022  She is currently on 90 mg of Mestinon a day  Her symptoms have improved significantly  The good days are fantastic  The bad days are far less bad than in the past  She has experienced diplopia, minor ptosis, b/l weakness, and some minor fatigue after chewing for a while  She is a lifetime non smoker  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  She would like to have this done in December 2022         The following portions of the patient's history were reviewed and updated as appropriate: allergies, current medications, past family history, past medical history, past social history, past surgical history and problem list     Past Medical History:   Diagnosis Date   • Depression    • History of migraine    • Low vitamin D level    • Uses birth control       Past Surgical History:   Procedure Laterality Date   • ADENOIDECTOMY     • TONSILLECTOMY AND ADENOIDECTOMY        Family History   Problem Relation Age of Onset   • No Known Problems Mother    • Diabetes Father    • Hypertension Father    • Hypertension Maternal Grandmother    • Breast cancer Paternal Grandmother       Social History Socioeconomic History   • Marital status: Single     Spouse name: Not on file   • Number of children: Not on file   • Years of education: Not on file   • Highest education level: Not on file   Occupational History   • Occupation: student   Tobacco Use   • Smoking status: Never Smoker   • Smokeless tobacco: Never Used   Vaping Use   • Vaping Use: Never used   Substance and Sexual Activity   • Alcohol use: Yes     Alcohol/week: 10 0 standard drinks     Types: 10 Standard drinks or equivalent per week     Comment: social   • Drug use: Never   • Sexual activity: Yes     Partners: Male     Birth control/protection: I U D     Other Topics Concern   • Not on file   Social History Narrative   • Not on file     Social Determinants of Health     Financial Resource Strain: Not on file   Food Insecurity: Not on file   Transportation Needs: Not on file   Physical Activity: Not on file   Stress: Not on file   Social Connections: Not on file   Intimate Partner Violence: Not on file   Housing Stability: Not on file      Allergies   Allergen Reactions   • Augmentin [Amoxicillin-Pot Clavulanate] Diarrhea   • Fish-Derived Products - Food Allergy Vomiting     Current Outpatient Medications on File Prior to Visit   Medication Sig Dispense Refill   • Baclofen 5 MG TABS Take 1 po HS 30 tablet 5   • cholecalciferol (VITAMIN D3) 1,000 units tablet Take 1,000 Units by mouth daily     • ergocalciferol (VITAMIN D2) 50,000 units Take 1 capsule (50,000 Units total) by mouth once a week 12 capsule 0   • ferrous sulfate 325 (65 Fe) mg tablet Take 325 mg by mouth daily at bedtime     • Fiber Complete TABS Take 2 tablets by mouth daily     • FLUoxetine (PROzac) 20 MG tablet Take 1 tablet (20 mg total) by mouth daily 30 tablet 1   • Levonorgestrel (MIRENA) 20 MCG/DAY IUD 1 each by Intrauterine route once     • Magnesium 400 MG CAPS Take 400 mg by mouth daily     • ondansetron (Zofran ODT) 4 mg disintegrating tablet Take 1 tablet (4 mg total) by mouth every 6 (six) hours as needed for nausea or vomiting 20 tablet 0   • pyridostigmine (Mestinon) 60 mg tablet Take 0 5 tablets (30 mg total) by mouth 3 (three) times a day 45 tablet 2   • rizatriptan (Maxalt-MLT) 10 mg disintegrating tablet Take 1 tablet (10 mg total) by mouth as needed for migraine Take at the onset of migraine; if symptoms continue or return, may take another dose at least 2 hours after first dose  Take no more than 2 doses in a day  18 tablet 0   • topiramate (TOPAMAX) 25 mg tablet 2 tab po qhs 180 tablet 3     No current facility-administered medications on file prior to visit  Review of Systems   Constitutional: Positive for fatigue  Negative for chills, fever and unexpected weight change  HENT: Negative for sore throat, trouble swallowing and voice change  Respiratory: Negative for cough and shortness of breath  Cardiovascular: Negative for chest pain, palpitations and leg swelling  Gastrointestinal: Positive for nausea (with her ha) and vomiting (with her ha )  Negative for abdominal pain  Musculoskeletal: Positive for neck pain and neck stiffness  Negative for back pain  Neurological: Positive for tremors, weakness and headaches  Negative for dizziness  Psychiatric/Behavioral: Negative for agitation, behavioral problems and confusion  All other systems reviewed and are negative  Objective:   Physical Exam  Vitals reviewed  Constitutional:       General: She is not in acute distress  Appearance: Normal appearance  She is well-developed  She is not diaphoretic  HENT:      Head: Normocephalic and atraumatic  Eyes:      General: No scleral icterus  Extraocular Movements: Extraocular movements intact  Neck:      Trachea: No tracheal deviation  Cardiovascular:      Rate and Rhythm: Normal rate and regular rhythm  Pulses: Normal pulses  Heart sounds: Normal heart sounds  No murmur heard    Pulmonary:      Effort: Pulmonary effort is normal  No respiratory distress  Breath sounds: Normal breath sounds  No wheezing  Abdominal:      General: Bowel sounds are normal  There is no distension  Palpations: Abdomen is soft  Musculoskeletal:         General: Normal range of motion  Cervical back: Normal range of motion and neck supple  Right lower leg: No edema  Left lower leg: No edema  Lymphadenopathy:      Cervical: No cervical adenopathy  Skin:     General: Skin is warm and dry  Findings: No erythema  Neurological:      Mental Status: She is alert and oriented to person, place, and time  Cranial Nerves: No cranial nerve deficit  Psychiatric:         Mood and Affect: Mood normal          Behavior: Behavior normal          Thought Content: Thought content normal      /78 (BP Location: Right arm, Patient Position: Sitting, Cuff Size: Standard)   Pulse 80   Temp 98 1 °F (36 7 °C) (Temporal)   Resp 17   Ht 5' 7" (1 702 m)   Wt 85 6 kg (188 lb 11 4 oz)   SpO2 98%   BMI 29 56 kg/m²       CT chest w contrast    Result Date: 9/16/2022  Narrative CT CHEST WITH IV CONTRAST INDICATION:   G70 00: Myasthenia gravis without (acute) exacerbation  COMPARISON:  None  TECHNIQUE: CT examination of the chest was performed  Axial, sagittal, and coronal 2D reformatted images were created from the source data and submitted for interpretation  Radiation dose length product (DLP) for this visit:  222 87 mGy-cm   This examination, like all CT scans performed in the [facility], was performed utilizing techniques to minimize radiation dose exposure, including the use of iterative  reconstruction and automated exposure control  IV Contrast:  85 mL of iohexol (OMNIPAQUE) FINDINGS: LUNGS:  Mild dependent atelectatic changes in the left lower lobe  Lungs otherwise clear  Trachea and bronchi are patent  PLEURA:  Unremarkable  HEART/GREAT VESSELS: Heart is unremarkable for patient's age    No thoracic aortic aneurysm  MEDIASTINUM AND BRANNON:  Homogeneous triangular soft tissue density at the anterior mediastinum compatible with residual thymic tissue, slightly full for patient's age may represent thymic hyperplasia  Approximate measurements of the thymus of 3 8 x 2 6 x  4 7 cm  However no discrete rounded mass, cystic changes or calcifications to suggest underlying thymoma  CHEST WALL AND LOWER NECK: 1 1 cm hypodense nodule in the right lobe of the thyroid gland  Incidental discovery of one or more thyroid nodule(s) measuring more than 1 cm but without suspicious features is noted in this patient who is younger than 28years old; according to guidelines published in the February 2015 white paper on incidental thyroid nodules in the Journal of the [employer] of Radiology [place]), further evaluation with thyroid ultrasound is recommended  Incidentally noted is a 1 9 cm cyst at the level of the suprasternal notch just left of midline  This is inferior to the left lobe of the thyroid  This could represent an incidental dermoid cyst or thyroglossal duct cyst  VISUALIZED STRUCTURES IN THE UPPER ABDOMEN:  Unremarkable  OSSEOUS STRUCTURES:  No acute fracture or destructive osseous lesion  Impression 1  Residual thymic tissue is noted to be somewhat full for patient's age and suggests thymic hyperplasia  Otherwise no findings suspicious for thymoma  2   Incidental thyroid nodule(s) for which nonemergent thyroid ultrasound is recommended  3   Incidentally noted is a 1 9 cm cyst at the level of the suprasternal notch just left of midline  This could represent an incidental dermoid cyst or thyroglossal duct cyst  The study was marked in EPIC for significant notification   Workstation performed: [id number]

## 2022-10-26 NOTE — ASSESSMENT & PLAN NOTE
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

## 2022-11-01 ENCOUNTER — TELEMEDICINE (OUTPATIENT)
Dept: PSYCHIATRY | Facility: CLINIC | Age: 22
End: 2022-11-01

## 2022-11-01 DIAGNOSIS — F32.89 OTHER DEPRESSION: Primary | ICD-10-CM

## 2022-11-01 NOTE — PSYCH
INDIVIDUAL SESSION NOTE     Length of time in session: 52 minutes, follow up in 2-3 weeks     Psychotherapy Provided: Individual      Diagnosis ICD-10-CM Associated Orders   1  Other depression  F32 89           Goals addressed in session: Goal 1     Pain:      none    0    Current suicide risk:  minimal    Data: Met with Jamison Favre for a scheduled individual session  Topics discussed included emotional wellness, relationships with family and physical health concerns  Kristi Reich is feeling "nervous"  Kristi Reich reported she has to get a Thymectomy for her Myasthenia Gravis  They haven't found anything alarming but that it's the direction they are going with her treatment  She'll be getting surgery on 12/16/22  She doesn't do well with anything medical related or with blood/bodily fluids; it makes her really nauseous  She can't pinpoint exactly why she's so nervous but she was encouraged to keep track of those worries--especially what if statements and then challenge those, play them out until the end  What if something bad happens? What if it does, what's the likelihood, I will handle it with the help of my doctor, it's not even likely to occur, etc    Kristi Reich shows evidence of utilizing effective communication skills and maintaining emotional composure to manage mental health symptoms  During this session, this clinical used the following therapeutic modalities supportive psychotherapy, client-centered therapy, CBT techniques and stress management skills  Assessment:  Kristi Reich presents with a anxious mood  her affect is normal range and intensity, appropriate  Kristi Reich was oriented x3  She was focused and engaged  Kristi Reich exhibits good therapeutic rapport with this clinician  she continues to exhibit willingness to work on treatment goals and objectives  Kristi Reich presents with a minimal risk of suicide, minimal risk of self-harm and minimal of harm to others        Plan:  Kristi Reich will return in 2-3 weeks for the next scheduled session  Between sessions, she  will work on challenging her worries in relation to her upcoming surgery and will report back during the next session re: success and barriers  At the next session, this clinical will use supportive psychotherapy and client-centered therapy to address her stress, in an effort to assist Navdeep Zapata with meeting treatment goals  Behavioral Health Treatment Plan [facility]: Diagnosis and Treatment Plan explained to Geovanni Cao relates understanding diagnosis and is agreeable to Treatment Plan  Yes     Virtual Regular Visit    Verification of patient location:    Patient is located in the following state in which I hold an active license [place]      Assessment/Plan:    Problem List Items Addressed This Visit    None     Visit Diagnoses     Other depression    -  Primary          Goals addressed in session: Goal 1          Reason for visit is   Chief Complaint   Patient presents with   • Virtual Regular Visit        Encounter provider Rama Fountain LCSW    Provider located at [address] [address] [phone]      Recent Visits  No visits were found meeting these conditions  Showing recent visits within past 7 days and meeting all other requirements  Today's Visits  Date Type Provider Dept   11/01/22 Telemedicine Rama Fountain LCSW [facility]   Showing today's visits and meeting all other requirements  Future Appointments  No visits were found meeting these conditions  Showing future appointments within next 150 days and meeting all other requirements       The patient was identified by name and date of birth  Shahzad Silva was informed that this is a telemedicine visit and [facility]  She agrees to proceed     My office door was closed  No one else was in the room    She acknowledged consent and understanding of privacy and security of the video platform  The patient has agreed to participate and understands they can discontinue the visit at any time  Patient is aware this is a billable service  Subjective  Mateus Walters is a 25 y o  female  HPI     Past Medical History:   Diagnosis Date   • Depression    • History of migraine    • Low vitamin D level    • Uses birth control        Past Surgical History:   Procedure Laterality Date   • ADENOIDECTOMY     • TONSILLECTOMY AND ADENOIDECTOMY         Current Outpatient Medications   Medication Sig Dispense Refill   • Baclofen 5 MG TABS Take 1 po HS 30 tablet 5   • cholecalciferol (VITAMIN D3) 1,000 units tablet Take 1,000 Units by mouth daily     • ergocalciferol (VITAMIN D2) 50,000 units Take 1 capsule (50,000 Units total) by mouth once a week 12 capsule 0   • ferrous sulfate 325 (65 Fe) mg tablet Take 325 mg by mouth daily at bedtime     • Fiber Complete TABS Take 2 tablets by mouth daily     • FLUoxetine (PROzac) 20 MG tablet Take 1 tablet (20 mg total) by mouth daily 30 tablet 1   • Levonorgestrel (MIRENA) 20 MCG/DAY IUD 1 each by Intrauterine route once     • Magnesium 400 MG CAPS Take 400 mg by mouth daily     • ondansetron (Zofran ODT) 4 mg disintegrating tablet Take 1 tablet (4 mg total) by mouth every 6 (six) hours as needed for nausea or vomiting 20 tablet 0   • pyridostigmine (Mestinon) 60 mg tablet Take 0 5 tablets (30 mg total) by mouth 3 (three) times a day 45 tablet 2   • rizatriptan (Maxalt-MLT) 10 mg disintegrating tablet Take 1 tablet (10 mg total) by mouth as needed for migraine Take at the onset of migraine; if symptoms continue or return, may take another dose at least 2 hours after first dose  Take no more than 2 doses in a day  18 tablet 0   • topiramate (TOPAMAX) 25 mg tablet 2 tab po qhs 180 tablet 3     No current facility-administered medications for this visit          Allergies   Allergen Reactions   • Augmentin [Amoxicillin-Pot Clavulanate] Diarrhea   • Fish-Derived Products - Food Allergy Vomiting       Review of Systems    Video Exam    There were no vitals filed for this visit      Physical Exam     Visit Time    Visit Start Time: 2:04pm   Visit Stop Time: 2:56pm   Total Visit Duration: 52 minutes

## 2022-11-08 ENCOUNTER — OFFICE VISIT (OUTPATIENT)
Dept: PHYSICAL THERAPY | Facility: CLINIC | Age: 22
End: 2022-11-08

## 2022-11-08 DIAGNOSIS — M62.81 MUSCLE WEAKNESS: Primary | ICD-10-CM

## 2022-11-08 DIAGNOSIS — R29.6 FALLS FREQUENTLY: ICD-10-CM

## 2022-11-08 NOTE — PROGRESS NOTES
Daily Note     Today's date: 2022  Patient name: Stephanie Esteves  : 2000  MRN: [id number]  Referring provider: Sandip Pino PA-C  Dx:   Encounter Diagnosis     ICD-10-CM    1  Muscle weakness  M62 81    2  Falls frequently  R29 6        Start Time: 1548  Stop Time: 1629  Total time in clinic (min): 41 minutes    Subjective: Near fall going up steep stairs but caught herself  Has not been as faithful to the exercise program as she should've been  But otherwise things going well  Seen by Cardiothoracic surgery, and decided to get surgery on thymoma  Objective: See treatment diary below      Assessment:  Given updated theraband for HEP and discussed performing exercises more routinely, avoiding if she feels over-exerted  Min cues to perform exercises  Standing breaks provided for LE fatigue  Note compensatory UE movement with step ups with more fatigue  Patient would benefit from continued PT      Plan: Continue per plan of care  Hip/trunk/shoulder strength  Follow up with HEP       Precautions: Falls, Hx of depression, caution with stairs  Re-eval Date: 2022    Date 10/25 11/8       Visit Count 13 14       FOTO See IE        Pain In See IE        Pain Out                 Testing         60s chair stand 13        5xSTS 10 87        Gait speed         FGA 29        2/6MWT 1356ft        Neuro Re-Ed 6MWT, 5xSTS, 60s chair stand, 10mWT        Dynamic balance         Static balance         Obstacle course                                             Ther Ex         SLR x 4  Blue TB extension 3x10       Mod plantigrade shoulder tap + reach         [employer] dog         Swimmers         Mini Squats         Bridges         Step ups  8' 1min lat steps x2       Squat into OH press         Leg press         Banded lateral steps PTB 20ft x4 20ft x6 OTB       Side plank 20" x2 knees 20" x2 knees       Pallof press         Weighted carry         SL RDL         Suitcase deadlift         W's Blue TB 2x10 Bench press 4lb 3x10 4lb 3x10       Rows         OH press  Shrug followed by [facility] press 7lb 3x8       Hamstring stretch         Hip flexor stretch         Calf stretch         Education         Ther Activity         ADL                  Gait Training         Divided Attention         Head turns         Modalities          prn

## 2022-11-14 ENCOUNTER — TELEPHONE (OUTPATIENT)
Dept: PSYCHIATRY | Facility: CLINIC | Age: 22
End: 2022-11-14

## 2022-11-14 NOTE — TELEPHONE ENCOUNTER
Pt called in to cancel appointment scheduled for tomorrow 11/15  Returned message to call office back at earliest convenience

## 2022-11-18 DIAGNOSIS — G70.00 MYASTHENIA GRAVIS (HCC): ICD-10-CM

## 2022-11-18 RX ORDER — PYRIDOSTIGMINE BROMIDE 60 MG/1
TABLET ORAL
Qty: 135 TABLET | Refills: 0 | Status: SHIPPED | OUTPATIENT
Start: 2022-11-18

## 2022-11-20 NOTE — PROGRESS NOTES
11/20/22 1100   C-SSRS (Frequent Screen)   2. Have you actually had any thoughts of killing yourself? No   6. Have you done anything, started to do anything, or prepared to do anything to end your life? No   Suicide Evaluation Negative Screen - White   Nursing Suicide Assessment Note - Inpatient    Current assessment:    Current C-SSRS score: (P) Negative Screen - White      Protective Factors / Reason for Living: Rastafari beliefs, Responsibility to children, Positive therapeutic relationships, Social supports    Interventions:   Implemented the Safety / Recovery Plan    Subjective:     Mental Health: Pt expresses she was having a difficult morning and her medications, including prn medications, were not helping with anxiety. Later on, she did say she was having a better day and her coping skills and groups were helpful. Pt denies SI and does not endorse current plan or intent. Pt denies current homicidal ideation, does not endorse plan/intent. Pt denies auditory and visual hallucinations.   Objective:   Pt up ad florencia and med compliant.      Pt appeared somatic, goal directed, needing consoling and redirection often. Pt attending groups, given therapeutic aids, found them effective during writer's shift.    Pt agrees to notify staff of any safety concerns during shift, 15 minute safety checks in place. Will continue to follow plan of care.      Medical:   Pain     Assessment / Actions:   PRN given:  PRN zofran given for nausea, pt states prn intervention effective with follow up assessment. PRN ubrogepant given for migraine, pt states prn intervention not effective with follow up assessment. PRN Fioricet given for headache unresolved by ubrogepant, pt states prn intervention effective with follow up assessment.     Plan:   Treatment Plan reviewed.     Daily Note     Today's date: 2022  Patient name: Lynda Kelly  : 2000  MRN: [id number]  Referring provider: Colleen Reyes PA-C  Dx:   Encounter Diagnosis     ICD-10-CM    1  Muscle weakness  M62 81    2  Falls frequently  R29 6        Start Time: 1332  Stop Time: 1410  Total time in clinic (min): 38 minutes    Subjective: Diagnosed with Myasthenia Gravis  Was put on medication and she notes she is feeling much better  Stairs are not as daunting  Has new neurology appt with specialist this month  Had some stomach issues when starting the medication but largely resolved  Objective: See treatment diary below      Assessment:  Discussed importance of energy conservation despite addition of medication and reports of improvement  Slowly progressed through initial programming with focus on proximal muscles  Will continue to progress program as tolerated, maintaining energy levels to perform ADLs  Patient would benefit from continued PT to improve muscular strength and endurance  Plan: Continue per plan of care  Monitor exercise progressions       Precautions: Falls, Hx of depression, caution with stairs  Re-eval Date: 2022    Date       Visit Count 1 2      FOTO See IE       Pain In See IE       Pain Out                Testing        TUG        5xSTS 13 81       Gait speed        FGA 22       2/6MWT 1110ft       Neuro Re-Ed        Dynamic balance        Static balance        Obstacle course                                        Ther Ex        SLR x 4  Mat 2x10 flx, abd      Swimmers  2x8      Mini Squats  At mat 2x10      Bridges  3' 2x10      Step ups  8' 2x10      Leg press  55lb 2x12      Hamstring stretch        Hip flexor stretch        Calf stretch        Education  AF      Ther Activity        ADL                Gait Training        Divided Attention        Head turns        Modalities         prn

## 2022-11-21 ENCOUNTER — APPOINTMENT (OUTPATIENT)
Dept: PREADMISSION TESTING | Facility: HOSPITAL | Age: 22
End: 2022-11-21

## 2022-11-22 ENCOUNTER — APPOINTMENT (OUTPATIENT)
Dept: PHYSICAL THERAPY | Facility: CLINIC | Age: 22
End: 2022-11-22

## 2022-11-29 ENCOUNTER — TELEMEDICINE (OUTPATIENT)
Dept: PSYCHIATRY | Facility: CLINIC | Age: 22
End: 2022-11-29

## 2022-11-29 DIAGNOSIS — F32.89 OTHER DEPRESSION: Primary | ICD-10-CM

## 2022-11-29 NOTE — PSYCH
INDIVIDUAL SESSION NOTE     Length of time in session: 52 minutes, follow up in 2-3 weeks     Psychotherapy Provided: Individual      Diagnosis ICD-10-CM Associated Orders   1  Other depression  F32 89              Goals addressed in session: Goal 1     Pain:      none    0    Current suicide risk:  minimal    Data: Met with Abi Whatley for a scheduled individual session  Topics discussed included emotional wellness, family stressors and physical health concerns  Dandre Galvan is feeling "ok"  She stated she went to [place] with her boyfriend to visit his family for Thanksgiving  On the way back, she stated she began to cry and cried for the whole day, stating she's not sure why  Dandre Galvan admitted that thinking about her upcoming surgery could be fueling it because she is very scared of having surgery  Dandre Galvan is frustrated because her sister is sick and not really keeping to herself  Dandre Galvan doesn't want to get sick and throw off anything with her upcoming surgery  She feels like if she brings it up, her sister would likely blow up at her about it and then "nothing would change"  Dandre Galvan feels frustrated with her sister overall  Dandre Galvan shows evidence of utilizing effective communication skills to manage mental health symptoms  During this session, this clinical used the following therapeutic modalities supportive psychotherapy, client-centered therapy and stress management skills  Assessment:  Dandre Galvan presents with a normal mood  her affect is normal range and intensity, appropriate  Dandre Galvan was oriented x3  She was focused and engaged  Dandre Galvan exhibits good therapeutic rapport with this clinician  she continues to exhibit willingness to work on treatment goals and objectives  Dandre Galvan presents with a minimal risk of suicide, minimal risk of self-harm and minimal of harm to others  Plan:  Dandre Galvan will return in 2-3 weeks for the next scheduled session      At the next session, this clinical will use supportive psychotherapy and client-centered therapy to address her depression, in an effort to assist Zuleyma with meeting treatment goals  Behavioral Health Treatment Plan [facility]: Diagnosis and Treatment Plan explained to Duey Payor relates understanding diagnosis and is agreeable to Treatment Plan  Yes     Virtual Regular Visit    Verification of patient location:    Patient is located in the following state in which I hold an active license [place]      Assessment/Plan:    Problem List Items Addressed This Visit    None  Visit Diagnoses     Other depression    -  Primary          Goals addressed in session: Goal 1          Reason for visit is   Chief Complaint   Patient presents with   • Virtual Regular Visit        Encounter provider Jenny Huertas LCSW    Provider located at [address] [place] [id number] [phone]      Recent Visits  No visits were found meeting these conditions  Showing recent visits within past 7 days and meeting all other requirements  Future Appointments  No visits were found meeting these conditions  Showing future appointments within next 150 days and meeting all other requirements       The patient was identified by name and date of birth  Harsha Mckeon was informed that this is a telemedicine visit and that [facility]  She agrees to proceed     My office door was closed  No one else was in the room  She acknowledged consent and understanding of privacy and security of the video platform  The patient has agreed to participate and understands they can discontinue the visit at any time  Patient is aware this is a billable service  Subjective  Harsha Mckeon is a 25 y o  female        HPI     Past Medical History:   Diagnosis Date   • Depression    • History of migraine    • Low vitamin D level    • Uses birth control        Past Surgical History:   Procedure Laterality Date   • ADENOIDECTOMY     • TONSILLECTOMY AND ADENOIDECTOMY         Current Outpatient Medications   Medication Sig Dispense Refill   • Baclofen 5 MG TABS Take 1 po HS 30 tablet 5   • cholecalciferol (VITAMIN D3) 1,000 units tablet Take 1,000 Units by mouth daily     • ergocalciferol (VITAMIN D2) 50,000 units Take 1 capsule (50,000 Units total) by mouth once a week 12 capsule 0   • ferrous sulfate 325 (65 Fe) mg tablet Take 325 mg by mouth daily at bedtime     • Fiber Complete TABS Take 2 tablets by mouth daily     • FLUoxetine (PROzac) 20 MG tablet Take 1 tablet (20 mg total) by mouth daily 30 tablet 1   • Levonorgestrel (MIRENA) 20 MCG/DAY IUD 1 each by Intrauterine route once     • Magnesium 400 MG CAPS Take 400 mg by mouth daily     • ondansetron (Zofran ODT) 4 mg disintegrating tablet Take 1 tablet (4 mg total) by mouth every 6 (six) hours as needed for nausea or vomiting 20 tablet 0   • pyridostigmine (MESTINON) 60 mg tablet TAKE 1/2 TAB BY MOUTH 3 TIMES A  tablet 0   • rizatriptan (Maxalt-MLT) 10 mg disintegrating tablet Take 1 tablet (10 mg total) by mouth as needed for migraine Take at the onset of migraine; if symptoms continue or return, may take another dose at least 2 hours after first dose  Take no more than 2 doses in a day  18 tablet 0   • topiramate (TOPAMAX) 25 mg tablet 2 tab po qhs 180 tablet 3     No current facility-administered medications for this visit  Allergies   Allergen Reactions   • Augmentin [Amoxicillin-Pot Clavulanate] Diarrhea   • Fish-Derived Products - Food Allergy Vomiting       Review of Systems    Video Exam    There were no vitals filed for this visit      Physical Exam     Visit Time    Visit Start Time: 2:03pm  Visit Stop Time: 2:55pm  Total Visit Duration: 52 minutes

## 2022-11-30 ENCOUNTER — TELEPHONE (OUTPATIENT)
Dept: NEUROLOGY | Facility: CLINIC | Age: 22
End: 2022-11-30

## 2022-11-30 NOTE — TELEPHONE ENCOUNTER
LMOM to remind pt of upcoming appt on 12/15/22  left the office number for any questions or concerns

## 2022-12-01 ENCOUNTER — OFFICE VISIT (OUTPATIENT)
Dept: CARDIAC SURGERY | Facility: CLINIC | Age: 22
End: 2022-12-01

## 2022-12-01 VITALS
SYSTOLIC BLOOD PRESSURE: 122 MMHG | RESPIRATION RATE: 16 BRPM | BODY MASS INDEX: 29.97 KG/M2 | HEART RATE: 84 BPM | DIASTOLIC BLOOD PRESSURE: 82 MMHG | OXYGEN SATURATION: 99 % | TEMPERATURE: 98.9 F | WEIGHT: 190.92 LBS | HEIGHT: 67 IN

## 2022-12-01 DIAGNOSIS — M62.81 MUSCLE WEAKNESS: Primary | ICD-10-CM

## 2022-12-01 NOTE — H&P (VIEW-ONLY)
Assessment/Plan:    Myasthenia gravis  Ms Leah Aguilera presents for an updated H&P prior to left robotic thymectomy for myasthenia gravis on 12/16  All of her questions were addressed  The post-operative period was described  She will complete her blood work this weekend  Diagnoses and all orders for this visit:    Myasthenia gravis          Thoracic History    Diagnosis: MG       Subjective:    Patient ID: Darrel Iyer is a 25 y o  female  Pollyann Schlichter 0    HPI   Ms Leah Aguilera is a 25year old female with myasthenia gravis who presents for an updated H&P prior to robotic left thoracoscopic thymectomy 12/16/22  CT scan of the chest from 9/13/22 revealed residual thymic tissue measuring 3 8 x 2 6 x 4 7 cm, without any discrete rounded mass, cystic changes, or calcifications  She was diagnosed with MG in August and started with symptoms in March 2022  She is currently on 90 mg of Mestinon a day  Her symptoms have improved significantly  The good days are fantastic  The bad days are far less bad than in the past  She has experienced diplopia, minor ptosis, b/l weakness, and some minor fatigue after chewing for a while  She is a lifetime non smoker  On discussion, she denies any interval change in past medical history  She has been doing very well over the last few days without significant MG symptoms  The following portions of the patient's history were reviewed and updated as appropriate: allergies, current medications, past family history, past medical history, past social history, past surgical history and problem list     Review of Systems   Constitutional: Negative  Eyes: Negative  Respiratory: Negative  Cardiovascular: Negative  Gastrointestinal: Negative  Musculoskeletal: Negative  Skin: Negative  Neurological: Negative  Hematological: Negative  Psychiatric/Behavioral: Negative  Objective:   Physical Exam  Vitals reviewed     Constitutional:       General: She is not in acute distress  Appearance: Normal appearance  She is well-developed  She is not diaphoretic  HENT:      Head: Normocephalic and atraumatic  Mouth/Throat:      Comments: Masked   Eyes:      General: No scleral icterus  Extraocular Movements: Extraocular movements intact  Comments: glasses   Neck:      Trachea: No tracheal deviation  Cardiovascular:      Rate and Rhythm: Normal rate and regular rhythm  Pulses: Normal pulses  Heart sounds: Normal heart sounds  No murmur heard  Pulmonary:      Effort: Pulmonary effort is normal  No respiratory distress  Breath sounds: Normal breath sounds  No wheezing  Abdominal:      General: Bowel sounds are normal  There is no distension  Palpations: Abdomen is soft  Musculoskeletal:         General: Normal range of motion  Cervical back: Normal range of motion and neck supple  Right lower leg: No edema  Left lower leg: No edema  Lymphadenopathy:      Cervical: No cervical adenopathy  Skin:     General: Skin is warm and dry  Findings: No erythema  Neurological:      Mental Status: She is alert and oriented to person, place, and time  Cranial Nerves: No cranial nerve deficit  Psychiatric:         Mood and Affect: Mood normal          Behavior: Behavior normal          Thought Content: Thought content normal      /82 (BP Location: Left arm, Patient Position: Sitting, Cuff Size: Standard)   Pulse 84   Temp 98 9 °F (37 2 °C) (Temporal)   Resp 16   Ht 5' 7" (1 702 m)   Wt 86 6 kg (190 lb 14 7 oz)   SpO2 99%   BMI 29 90 kg/m²       CT chest w contrast    Result Date: 9/16/2022  Narrative CT CHEST WITH IV CONTRAST INDICATION:   G70 00: Myasthenia gravis without (acute) exacerbation  COMPARISON:  None  TECHNIQUE: CT examination of the chest was performed  Axial, sagittal, and coronal 2D reformatted images were created from the source data and submitted for interpretation   Radiation dose length product (DLP) for this visit:  222 87 mGy-cm   This examination, like all CT scans performed in the [facility], was performed utilizing techniques to minimize radiation dose exposure, including the use of iterative  reconstruction and automated exposure control  IV Contrast:  85 mL of iohexol (OMNIPAQUE) FINDINGS: LUNGS:  Mild dependent atelectatic changes in the left lower lobe  Lungs otherwise clear  Trachea and bronchi are patent  PLEURA:  Unremarkable  HEART/GREAT VESSELS: Heart is unremarkable for patient's age  No thoracic aortic aneurysm  MEDIASTINUM AND BRANNON:  Homogeneous triangular soft tissue density at the anterior mediastinum compatible with residual thymic tissue, slightly full for patient's age may represent thymic hyperplasia  Approximate measurements of the thymus of 3 8 x 2 6 x  4 7 cm  However no discrete rounded mass, cystic changes or calcifications to suggest underlying thymoma  CHEST WALL AND LOWER NECK: 1 1 cm hypodense nodule in the right lobe of the thyroid gland  Incidental discovery of one or more thyroid nodule(s) measuring more than 1 cm but without suspicious features is noted in this patient who is younger than 28years old; according to guidelines published in the February 2015 white paper on incidental thyroid nodules in the Journal of the [employer] Oscar Velez), further evaluation with thyroid ultrasound is recommended  Incidentally noted is a 1 9 cm cyst at the level of the suprasternal notch just left of midline  This is inferior to the left lobe of the thyroid  This could represent an incidental dermoid cyst or thyroglossal duct cyst  VISUALIZED STRUCTURES IN THE UPPER ABDOMEN:  Unremarkable  OSSEOUS STRUCTURES:  No acute fracture or destructive osseous lesion  Impression 1  Residual thymic tissue is noted to be somewhat full for patient's age and suggests thymic hyperplasia  Otherwise no findings suspicious for thymoma   2   Incidental thyroid nodule(s) for which nonemergent thyroid ultrasound is recommended  3   Incidentally noted is a 1 9 cm cyst at the level of the suprasternal notch just left of midline  This could represent an incidental dermoid cyst or thyroglossal duct cyst  The study was marked in EPIC for significant notification   Workstation performed: [id number]

## 2022-12-01 NOTE — ASSESSMENT & PLAN NOTE
Ms  Yusuf Fernandez presents for an updated H&P prior to left robotic thymectomy for myasthenia gravis on 12/16  All of her questions were addressed  The post-operative period was described  She will complete her blood work this weekend

## 2022-12-01 NOTE — PROGRESS NOTES
Assessment/Plan:    Myasthenia gravis  Ms Mino Piña presents for an updated H&P prior to left robotic thymectomy for myasthenia gravis on 12/16  All of her questions were addressed  The post-operative period was described  She will complete her blood work this weekend  Diagnoses and all orders for this visit:    Myasthenia gravis          Thoracic History    Diagnosis: MG       Subjective:    Patient ID: Misael Ramos is a 25 y o  female  Mireya Patient 0    HPI   Ms Mino Piña is a 25year old female with myasthenia gravis who presents for an updated H&P prior to robotic left thoracoscopic thymectomy 12/16/22  CT scan of the chest from 9/13/22 revealed residual thymic tissue measuring 3 8 x 2 6 x 4 7 cm, without any discrete rounded mass, cystic changes, or calcifications  She was diagnosed with MG in August and started with symptoms in March 2022  She is currently on 90 mg of Mestinon a day  Her symptoms have improved significantly  The good days are fantastic  The bad days are far less bad than in the past  She has experienced diplopia, minor ptosis, b/l weakness, and some minor fatigue after chewing for a while  She is a lifetime non smoker  On discussion, she denies any interval change in past medical history  She has been doing very well over the last few days without significant MG symptoms  The following portions of the patient's history were reviewed and updated as appropriate: allergies, current medications, past family history, past medical history, past social history, past surgical history and problem list     Review of Systems   Constitutional: Negative  Eyes: Negative  Respiratory: Negative  Cardiovascular: Negative  Gastrointestinal: Negative  Musculoskeletal: Negative  Skin: Negative  Neurological: Negative  Hematological: Negative  Psychiatric/Behavioral: Negative  Objective:   Physical Exam  Vitals reviewed     Constitutional:       General: She is not in acute distress  Appearance: Normal appearance  She is well-developed  She is not diaphoretic  HENT:      Head: Normocephalic and atraumatic  Mouth/Throat:      Comments: Masked   Eyes:      General: No scleral icterus  Extraocular Movements: Extraocular movements intact  Comments: glasses   Neck:      Trachea: No tracheal deviation  Cardiovascular:      Rate and Rhythm: Normal rate and regular rhythm  Pulses: Normal pulses  Heart sounds: Normal heart sounds  No murmur heard  Pulmonary:      Effort: Pulmonary effort is normal  No respiratory distress  Breath sounds: Normal breath sounds  No wheezing  Abdominal:      General: Bowel sounds are normal  There is no distension  Palpations: Abdomen is soft  Musculoskeletal:         General: Normal range of motion  Cervical back: Normal range of motion and neck supple  Right lower leg: No edema  Left lower leg: No edema  Lymphadenopathy:      Cervical: No cervical adenopathy  Skin:     General: Skin is warm and dry  Findings: No erythema  Neurological:      Mental Status: She is alert and oriented to person, place, and time  Cranial Nerves: No cranial nerve deficit  Psychiatric:         Mood and Affect: Mood normal          Behavior: Behavior normal          Thought Content: Thought content normal      /82 (BP Location: Left arm, Patient Position: Sitting, Cuff Size: Standard)   Pulse 84   Temp 98 9 °F (37 2 °C) (Temporal)   Resp 16   Ht 5' 7" (1 702 m)   Wt 86 6 kg (190 lb 14 7 oz)   SpO2 99%   BMI 29 90 kg/m²       CT chest w contrast    Result Date: 9/16/2022  Narrative CT CHEST WITH IV CONTRAST INDICATION:   G70 00: Myasthenia gravis without (acute) exacerbation  COMPARISON:  None  TECHNIQUE: CT examination of the chest was performed  Axial, sagittal, and coronal 2D reformatted images were created from the source data and submitted for interpretation   Radiation dose length product (DLP) for this visit:  222 87 mGy-cm   This examination, like all CT scans performed in the [facility], was performed utilizing techniques to minimize radiation dose exposure, including the use of iterative  reconstruction and automated exposure control  IV Contrast:  85 mL of iohexol (OMNIPAQUE) FINDINGS: LUNGS:  Mild dependent atelectatic changes in the left lower lobe  Lungs otherwise clear  Trachea and bronchi are patent  PLEURA:  Unremarkable  HEART/GREAT VESSELS: Heart is unremarkable for patient's age  No thoracic aortic aneurysm  MEDIASTINUM AND BRANNON:  Homogeneous triangular soft tissue density at the anterior mediastinum compatible with residual thymic tissue, slightly full for patient's age may represent thymic hyperplasia  Approximate measurements of the thymus of 3 8 x 2 6 x  4 7 cm  However no discrete rounded mass, cystic changes or calcifications to suggest underlying thymoma  CHEST WALL AND LOWER NECK: 1 1 cm hypodense nodule in the right lobe of the thyroid gland  Incidental discovery of one or more thyroid nodule(s) measuring more than 1 cm but without suspicious features is noted in this patient who is younger than 28years old; according to guidelines published in the February 2015 white paper on incidental thyroid nodules in the Journal of the [employer] Nicki Howard), further evaluation with thyroid ultrasound is recommended  Incidentally noted is a 1 9 cm cyst at the level of the suprasternal notch just left of midline  This is inferior to the left lobe of the thyroid  This could represent an incidental dermoid cyst or thyroglossal duct cyst  VISUALIZED STRUCTURES IN THE UPPER ABDOMEN:  Unremarkable  OSSEOUS STRUCTURES:  No acute fracture or destructive osseous lesion  Impression 1  Residual thymic tissue is noted to be somewhat full for patient's age and suggests thymic hyperplasia  Otherwise no findings suspicious for thymoma   2   Incidental thyroid nodule(s) for which nonemergent thyroid ultrasound is recommended  3   Incidentally noted is a 1 9 cm cyst at the level of the suprasternal notch just left of midline  This could represent an incidental dermoid cyst or thyroglossal duct cyst  The study was marked in EPIC for significant notification   Workstation performed: [id number]

## 2022-12-05 NOTE — PRE-PROCEDURE INSTRUCTIONS
Pre-Surgery Instructions:   Medication Instructions   • Baclofen 5 MG TABS Instructed to take per normal schedule including DOS with sips water   • cholecalciferol (VITAMIN D3) 1,000 units tablet Instructed to take per normal schedule except DOS   • ergocalciferol (VITAMIN D2) 50,000 units Instructed to take per normal schedule except DOS   • ferrous sulfate 325 (65 Fe) mg tablet Instructed to take per normal schedule except DOS   • Fiber Complete TABS Instructed to take per normal schedule except DOS   • ondansetron (Zofran ODT) 4 mg disintegrating tablet Instructed to take as needed including DOS with sips water   • pyridostigmine (MESTINON) 60 mg tablet Instructed to take per normal schedule including DOS with sips water   • topiramate (TOPAMAX) 25 mg tablet Instructed to take per normal schedule including DOS with sips water    Pre op,medications and showering instructions reviewed-Patient has hibiclens  Instructed to avoid all ASA and OTC Vit/Supp 1 week prior to surgery and to avoid NSAIDs 3 days prior to surgery per anesthesia instructions  Tylenol ok to take prn  Pt states Vitamins were prescribed by MD  Pt  Verbalized an understanding of all instructions reviewed and offers no concerns at this time

## 2022-12-06 ENCOUNTER — APPOINTMENT (OUTPATIENT)
Dept: LAB | Facility: HOSPITAL | Age: 22
End: 2022-12-06

## 2022-12-06 ENCOUNTER — OFFICE VISIT (OUTPATIENT)
Dept: PHYSICAL THERAPY | Facility: CLINIC | Age: 22
End: 2022-12-06

## 2022-12-06 DIAGNOSIS — M62.81 MUSCLE WEAKNESS (GENERALIZED): ICD-10-CM

## 2022-12-06 DIAGNOSIS — M62.81 MUSCLE WEAKNESS: ICD-10-CM

## 2022-12-06 DIAGNOSIS — R29.6 FALLS FREQUENTLY: ICD-10-CM

## 2022-12-06 DIAGNOSIS — M62.81 MUSCLE WEAKNESS: Primary | ICD-10-CM

## 2022-12-06 LAB
ANION GAP SERPL CALCULATED.3IONS-SCNC: 8 MMOL/L (ref 4–13)
BUN SERPL-MCNC: 14 MG/DL (ref 5–25)
CALCIUM SERPL-MCNC: 9.3 MG/DL (ref 8.3–10.1)
CHLORIDE SERPL-SCNC: 105 MMOL/L (ref 96–108)
CO2 SERPL-SCNC: 28 MMOL/L (ref 21–32)
CREAT SERPL-MCNC: 0.8 MG/DL (ref 0.6–1.3)
ERYTHROCYTE [DISTWIDTH] IN BLOOD BY AUTOMATED COUNT: 12.1 % (ref 11.6–15.1)
GFR SERPL CREATININE-BSD FRML MDRD: 104 ML/MIN/1.73SQ M
GLUCOSE SERPL-MCNC: 94 MG/DL (ref 65–140)
HCT VFR BLD AUTO: 39.1 % (ref 34.8–46.1)
HGB BLD-MCNC: 12.5 G/DL (ref 11.5–15.4)
MCH RBC QN AUTO: 28.2 PG (ref 26.8–34.3)
MCHC RBC AUTO-ENTMCNC: 32 G/DL (ref 31.4–37.4)
MCV RBC AUTO: 88 FL (ref 82–98)
PLATELET # BLD AUTO: 298 THOUSANDS/UL (ref 149–390)
PMV BLD AUTO: 9.8 FL (ref 8.9–12.7)
POTASSIUM SERPL-SCNC: 3.8 MMOL/L (ref 3.5–5.3)
RBC # BLD AUTO: 4.43 MILLION/UL (ref 3.81–5.12)
SODIUM SERPL-SCNC: 141 MMOL/L (ref 135–147)
WBC # BLD AUTO: 6.56 THOUSAND/UL (ref 4.31–10.16)

## 2022-12-06 NOTE — PROGRESS NOTES
PT Discharge    Today's date: 2022  Patient name: Mindy Denney  : 2000  MRN: [id number]  Referring provider: Lisha Dumas PA-C  Dx:   Encounter Diagnosis     ICD-10-CM    1  Muscle weakness  M62 81       2  Falls frequently  R29 6           Start Time: 1548  Stop Time: 1614  Total time in clinic (min): 26 minutes    Assessment  Assessment details: Patient is a 25y o  year old female presenting to OPPT s/p diagnosis of muscular weakness and frequent falls  Stephenie Buckner has been unable to attend therapy but has been diligent with her HEP  She notes improvement from her last re-evaluation and feels more confident in completing daily tasks  She notes less fatigue and improved endurance with recent activity  She has also made gains in her 6MWT by 100ft and improved her 60 second chair stand by nearly 10 stands  She has met her PT goals as this time  Instructed to follow up as needed as she is undergoing surgery in the near future, return as needed if she notices her strength/endurance reduce  Pt and PT mutually agree to discharge from skilled PT  Impairments:      Prognosis: good    Goals  In 4 weeks, patient will:  1  Report improved confidence with negotiating curbs and flat room environment - met  2  Demonstrate less muscular fatigue during her travel to work to assist with QoL - met  3  Improve 5xSTS by at least 2 seconds to show improved LE strength and stability to assist with transfers  - progressing    In 4-10 weeks, patient will:  1  Meet FOTO predicted score to demonstrate improvement in functional mobility  2   Demonstrate consistent carryover with HEP and walking program  - met  3  Improve BLE strength by at least 1 point in each muscle group - progressing  4  Improve 6MWT by at least 100ft to demonstrate improvement in ability in community ambulation  - met  5  Report improved confidence with stair negotiation and safety awareness with fatigue  - met  6    Reduce number of falls while negotiating stairs than previous months to reduce risk of injury  - met  7  Improve FGA to above cutoff risk to reduce risk of falls  - met    9/29 updated goals:  1  Improve 60second chair stand by at least 4 stands to show improved muscular endurance  - nearly met  2  Carry heavier items for at least 150ft distances to assist with work tasks  -met    In 4 weeks patient will:  1  Improve gait speed by at least 0 10 m/s to assist with safety crossing a crosswalk - met  2  Improve 60s chair stand test by at least 3 more stands to show improved muscular power and endurance - met      In 8 weeks patient will:  1  Independent with final HEP - met   2  Improve 6MWT by at least 100 more feet from last re-assessment to show improved community ambulation endurance - met    Plan  Patient would benefit from: skilled physical therapy  Planned therapy interventions: neuromuscular re-education, manual therapy, patient education, home exercise program, therapeutic exercise, therapeutic activities and gait training  Frequency: 1x every 2 weeks  Duration in weeks: 8  Plan of Care beginning date: 10/25/2022  Plan of Care expiration date: 12/30/2022  Treatment plan discussed with: patient        Subjective Evaluation    History of Present Illness  Mechanism of injury: 12/6: things going well  Walked up SO steps with no difficulty and this had been very hard in the past  Traveled to [employer] and was doing a lot of standing and walking with no issues  She feels like things are going to the back of her mind when it comes to her fatigue  Getting surgery soon  Pt encouraged to follow up after surgery  Will be following with neuro and cardiothoracic sx     10/25: Pt feeling good  Feels like she is in a much better place overall  No falls  Went up stairs while holding cup of tea without spilling or placing it on the ground  Doing more at home and doing more activities  9/29: Less falls  No change in meds  Saw neuro and had EMG   Less fatigued overall  Going to see cardiothoracic sx for thymoma  Present at PT: issues started in feb/march  Started when she was at [facility] and having to do stairs a lot  Never had issues prior  Stairs and curbs have been rough  Knees give out, mostly her R knee however feels like it switches  Notes fatigue  Needs to save energy to perform stairs at end of the day  1st fall was coming down stairs to go to her boyfriends house  Was carrying a lot of stuff  [place] her knee go out  Most recent fall  Was getting out of bed, less impact makes her fall    A lot of sitting  Will stand at [employer] bag during travel to work  Has to walk 3 blocks to get to work  Will feel fatigued walking to work depending on the day  Will feel it in her legs more than anything  Feels like she has tremoring  Last night had n/t in all her R fingers  Lasted 10min, neck felt stiff  Her and her mom have discussed the thought of MS  Has had imaging in the past which is clear, however, they are planning on finishing the CNS with thoracolumbar imaging  Pain  Current pain ratin  At best pain ratin  At worst pain ratin  Location: Low back and R knee  Social Support  Stairs in house: yes   Lives in: multiple-level home      Diagnostic Tests  MRI studies: abnormal  Treatments  Previous treatment: physical therapy  Patient Goals  Patient goals for therapy: increased strength and improved balance  Patient goal: Feel more confident walking and moving around          Objective     Neurological Testing     Sensation     Lumbar   Left   Intact: light touch    Right   Intact: light touch    Reflexes   Left   Biceps (C5/C6): normal (2+)  Triceps (C7): trace (1+)  Patellar (L4): trace (1+)  Achilles (S1): trace (1+)  Clonus sign: negative    Right   Biceps (C5/C6): normal (2+)  Triceps (C7): trace (1+)  Patellar (L4): trace (1+)  Achilles (S1): trace (1+)  Clonus sign: negative    Strength/Myotome Testing Left Hip   Planes of Motion   Flexion: 4  Extension: 4-  Abduction: 4-    Right Hip   Planes of Motion   Flexion: 4  Extension: 4-  Abduction: 4-    Left Knee   Flexion: 4-  Extension: 4    Right Knee   Flexion: 4-  Extension: 4    Left Ankle/Foot   Dorsiflexion: 4-  Plantar flexion: 4+    Right Ankle/Foot   Dorsiflexion: 4-  Plantar flexion: 4+  Neuro Exam:     Sensation   Light touch LE: left WNL and right WNL    Coordination   Rapid alternating movements: UE WNL and LE impaired     Functional outcomes   6 minute walk test: 1445ft  5x sit to stand: 10 87 (seconds)  Functional outcome comment: FGA 29  60 seconds chair stand: 13 stands - 12/6 > 23 stands  Gait speed: 1 08m/s - 12/6 > 1 18m/s    Flowsheet Rows    Flowsheet Row Most Recent Value   PT/OT G-Codes    Current Score 60   Projected Score 61             Precautions: Falls, Hx of depression, caution with stairs  Re-eval Date: 12/30/2022    Date 10/25 11/8 12/6      Visit Count 13 14 15      FOTO See IE        Pain In See IE        Pain Out                 Testing         60s chair stand 13        5xSTS 10 87        Gait speed         FGA 29        2/6MWT 1356ft        Neuro Re-Ed 6MWT, 5xSTS, 60s chair stand, 10mWT  6MWT, 60s, 10mWT      Dynamic balance         Static balance         Obstacle course                                             Ther Ex         SLR x 4  Blue TB extension 3x10       Mod plantigrade shoulder tap + reach         [employer] dog         Swimmers         Mini Squats         Bridges         Step ups  8' 1min lat steps x2       Squat into OH press         Leg press         Banded lateral steps PTB 20ft x4 20ft x6 OTB       Side plank 20" x2 knees 20" x2 knees       [place] press         Weighted carry         SL RDL         Suitcase deadlift         W's Blue TB 2x10                 Bench press 4lb 3x10 4lb 3x10       Rows         OH press  Shrug followed by [place] press 7lb 3x8       Hamstring stretch         Hip flexor stretch         Calf stretch         Education         Ther Activity         ADL                  Gait Training         Divided Attention         Head turns         Modalities          prn

## 2022-12-07 ENCOUNTER — LAB REQUISITION (OUTPATIENT)
Dept: LAB | Facility: HOSPITAL | Age: 22
End: 2022-12-07

## 2022-12-07 DIAGNOSIS — M62.81 MUSCLE WEAKNESS (GENERALIZED): ICD-10-CM

## 2022-12-07 LAB
ABO GROUP BLD: NORMAL
BLD GP AB SCN SERPL QL: NEGATIVE
RH BLD: POSITIVE
SPECIMEN EXPIRATION DATE: NORMAL

## 2022-12-16 ENCOUNTER — APPOINTMENT (OUTPATIENT)
Dept: RADIOLOGY | Facility: HOSPITAL | Age: 22
End: 2022-12-16

## 2022-12-16 ENCOUNTER — ANESTHESIA EVENT (OUTPATIENT)
Dept: PERIOP | Facility: HOSPITAL | Age: 22
End: 2022-12-16

## 2022-12-16 ENCOUNTER — ANESTHESIA (OUTPATIENT)
Dept: PERIOP | Facility: HOSPITAL | Age: 22
End: 2022-12-16

## 2022-12-16 ENCOUNTER — HOSPITAL ENCOUNTER (INPATIENT)
Facility: HOSPITAL | Age: 22
LOS: 1 days | Discharge: HOME | End: 2022-12-17
Attending: THORACIC SURGERY (CARDIOTHORACIC VASCULAR SURGERY) | Admitting: THORACIC SURGERY (CARDIOTHORACIC VASCULAR SURGERY)

## 2022-12-16 DIAGNOSIS — M62.81 MUSCLE WEAKNESS: ICD-10-CM

## 2022-12-16 DIAGNOSIS — M62.81 MUSCLE WEAKNESS: Primary | ICD-10-CM

## 2022-12-16 LAB
ANION GAP SERPL CALCULATED.3IONS-SCNC: 5 MMOL/L (ref 4–13)
BUN SERPL-MCNC: 13 MG/DL (ref 5–25)
CALCIUM SERPL-MCNC: 8.9 MG/DL (ref 8.3–10.1)
CHLORIDE SERPL-SCNC: 113 MMOL/L (ref 96–108)
CO2 SERPL-SCNC: 23 MMOL/L (ref 21–32)
CREAT SERPL-MCNC: 0.97 MG/DL (ref 0.6–1.3)
ERYTHROCYTE [DISTWIDTH] IN BLOOD BY AUTOMATED COUNT: 12.1 % (ref 11.6–15.1)
EXT PREGNANCY TEST URINE: NEGATIVE
EXT. CONTROL: NORMAL
GFR SERPL CREATININE-BSD FRML MDRD: 83 ML/MIN/1.73SQ M
GLUCOSE SERPL-MCNC: 121 MG/DL (ref 65–140)
HCT VFR BLD AUTO: 41.7 % (ref 34.8–46.1)
HGB BLD-MCNC: 13.2 G/DL (ref 11.5–15.4)
MAGNESIUM SERPL-MCNC: 2.3 MG/DL (ref 1.6–2.6)
MCH RBC QN AUTO: 28.6 PG (ref 26.8–34.3)
MCHC RBC AUTO-ENTMCNC: 31.7 G/DL (ref 31.4–37.4)
MCV RBC AUTO: 90 FL (ref 82–98)
PLATELET # BLD AUTO: 245 THOUSANDS/UL (ref 149–390)
PMV BLD AUTO: 9.7 FL (ref 8.9–12.7)
POTASSIUM SERPL-SCNC: 3.9 MMOL/L (ref 3.5–5.3)
RBC # BLD AUTO: 4.62 MILLION/UL (ref 3.81–5.12)
SODIUM SERPL-SCNC: 141 MMOL/L (ref 135–147)
WBC # BLD AUTO: 9.94 THOUSAND/UL (ref 4.31–10.16)

## 2022-12-16 PROCEDURE — 07TM4ZZ RESECTION OF THYMUS, PERCUTANEOUS ENDOSCOPIC APPROACH: ICD-10-PCS | Performed by: THORACIC SURGERY (CARDIOTHORACIC VASCULAR SURGERY)

## 2022-12-16 PROCEDURE — 0BJ08ZZ INSPECTION OF TRACHEOBRONCHIAL TREE, VIA NATURAL OR ARTIFICIAL OPENING ENDOSCOPIC: ICD-10-PCS | Performed by: THORACIC SURGERY (CARDIOTHORACIC VASCULAR SURGERY)

## 2022-12-16 PROCEDURE — 8E0W4CZ ROBOTIC ASSISTED PROCEDURE OF TRUNK REGION, PERCUTANEOUS ENDOSCOPIC APPROACH: ICD-10-PCS | Performed by: THORACIC SURGERY (CARDIOTHORACIC VASCULAR SURGERY)

## 2022-12-16 PROCEDURE — 3E0T3BZ INTRODUCTION OF ANESTHETIC AGENT INTO PERIPHERAL NERVES AND PLEXI, PERCUTANEOUS APPROACH: ICD-10-PCS | Performed by: THORACIC SURGERY (CARDIOTHORACIC VASCULAR SURGERY)

## 2022-12-16 RX ORDER — METOCLOPRAMIDE HYDROCHLORIDE 5 MG/ML
10 INJECTION INTRAMUSCULAR; INTRAVENOUS ONCE
Status: DISCONTINUED | OUTPATIENT
Start: 2022-12-16 | End: 2022-12-16 | Stop reason: HOSPADM

## 2022-12-16 RX ORDER — KETOROLAC TROMETHAMINE 30 MG/ML
15 INJECTION, SOLUTION INTRAMUSCULAR; INTRAVENOUS EVERY 6 HOURS SCHEDULED
Status: COMPLETED | OUTPATIENT
Start: 2022-12-16 | End: 2022-12-17

## 2022-12-16 RX ORDER — ONDANSETRON 2 MG/ML
4 INJECTION INTRAMUSCULAR; INTRAVENOUS EVERY 6 HOURS PRN
Status: DISCONTINUED | OUTPATIENT
Start: 2022-12-16 | End: 2022-12-17 | Stop reason: HOSPADM

## 2022-12-16 RX ORDER — SENNOSIDES 8.6 MG
1 TABLET ORAL DAILY
Status: DISCONTINUED | OUTPATIENT
Start: 2022-12-16 | End: 2022-12-17 | Stop reason: HOSPADM

## 2022-12-16 RX ORDER — SODIUM CHLORIDE 9 MG/ML
INJECTION, SOLUTION INTRAVENOUS CONTINUOUS PRN
Status: DISCONTINUED | OUTPATIENT
Start: 2022-12-16 | End: 2022-12-16

## 2022-12-16 RX ORDER — PYRIDOSTIGMINE BROMIDE 60 MG/1
30 TABLET ORAL 3 TIMES DAILY
Status: DISCONTINUED | OUTPATIENT
Start: 2022-12-16 | End: 2022-12-17

## 2022-12-16 RX ORDER — ONDANSETRON 2 MG/ML
4 INJECTION INTRAMUSCULAR; INTRAVENOUS ONCE AS NEEDED
Status: DISCONTINUED | OUTPATIENT
Start: 2022-12-16 | End: 2022-12-16 | Stop reason: HOSPADM

## 2022-12-16 RX ORDER — SODIUM CHLORIDE, SODIUM LACTATE, POTASSIUM CHLORIDE, CALCIUM CHLORIDE 600; 310; 30; 20 MG/100ML; MG/100ML; MG/100ML; MG/100ML
100 INJECTION, SOLUTION INTRAVENOUS CONTINUOUS
Status: DISCONTINUED | OUTPATIENT
Start: 2022-12-16 | End: 2022-12-16

## 2022-12-16 RX ORDER — DEXAMETHASONE SODIUM PHOSPHATE 10 MG/ML
INJECTION, SOLUTION INTRAMUSCULAR; INTRAVENOUS AS NEEDED
Status: DISCONTINUED | OUTPATIENT
Start: 2022-12-16 | End: 2022-12-16

## 2022-12-16 RX ORDER — OXYCODONE HYDROCHLORIDE 5 MG/1
5 TABLET ORAL EVERY 4 HOURS PRN
Status: DISCONTINUED | OUTPATIENT
Start: 2022-12-16 | End: 2022-12-17 | Stop reason: HOSPADM

## 2022-12-16 RX ORDER — POLYETHYLENE GLYCOL 3350 17 G/17G
17 POWDER, FOR SOLUTION ORAL DAILY
Status: DISCONTINUED | OUTPATIENT
Start: 2022-12-17 | End: 2022-12-17 | Stop reason: HOSPADM

## 2022-12-16 RX ORDER — FENTANYL CITRATE 50 UG/ML
INJECTION, SOLUTION INTRAMUSCULAR; INTRAVENOUS AS NEEDED
Status: DISCONTINUED | OUTPATIENT
Start: 2022-12-16 | End: 2022-12-16

## 2022-12-16 RX ORDER — CEFAZOLIN SODIUM 2 G/50ML
2000 SOLUTION INTRAVENOUS ONCE
Status: DISCONTINUED | OUTPATIENT
Start: 2022-12-16 | End: 2022-12-16 | Stop reason: HOSPADM

## 2022-12-16 RX ORDER — ALBUTEROL SULFATE 2.5 MG/3ML
2.5 SOLUTION RESPIRATORY (INHALATION) ONCE AS NEEDED
Status: DISCONTINUED | OUTPATIENT
Start: 2022-12-16 | End: 2022-12-16 | Stop reason: HOSPADM

## 2022-12-16 RX ORDER — HYDROMORPHONE HCL/PF 1 MG/ML
0.5 SYRINGE (ML) INJECTION EVERY 2 HOUR PRN
Status: DISCONTINUED | OUTPATIENT
Start: 2022-12-16 | End: 2022-12-17 | Stop reason: HOSPADM

## 2022-12-16 RX ORDER — MIDAZOLAM HYDROCHLORIDE 2 MG/2ML
INJECTION, SOLUTION INTRAMUSCULAR; INTRAVENOUS AS NEEDED
Status: DISCONTINUED | OUTPATIENT
Start: 2022-12-16 | End: 2022-12-16

## 2022-12-16 RX ORDER — SODIUM CHLORIDE, SODIUM LACTATE, POTASSIUM CHLORIDE, CALCIUM CHLORIDE 600; 310; 30; 20 MG/100ML; MG/100ML; MG/100ML; MG/100ML
75 INJECTION, SOLUTION INTRAVENOUS CONTINUOUS
Status: DISCONTINUED | OUTPATIENT
Start: 2022-12-16 | End: 2022-12-17

## 2022-12-16 RX ORDER — ACETAMINOPHEN 325 MG/1
975 TABLET ORAL EVERY 6 HOURS
Status: DISCONTINUED | OUTPATIENT
Start: 2022-12-16 | End: 2022-12-17 | Stop reason: HOSPADM

## 2022-12-16 RX ORDER — PROPOFOL 10 MG/ML
INJECTION, EMULSION INTRAVENOUS AS NEEDED
Status: DISCONTINUED | OUTPATIENT
Start: 2022-12-16 | End: 2022-12-16

## 2022-12-16 RX ORDER — TOPIRAMATE 25 MG/1
50 TABLET ORAL
Status: DISCONTINUED | OUTPATIENT
Start: 2022-12-16 | End: 2022-12-17 | Stop reason: HOSPADM

## 2022-12-16 RX ORDER — MAGNESIUM HYDROXIDE 1200 MG/15ML
LIQUID ORAL AS NEEDED
Status: DISCONTINUED | OUTPATIENT
Start: 2022-12-16 | End: 2022-12-16 | Stop reason: HOSPADM

## 2022-12-16 RX ORDER — HYDROMORPHONE HCL IN WATER/PF 6 MG/30 ML
0.2 PATIENT CONTROLLED ANALGESIA SYRINGE INTRAVENOUS
Status: DISCONTINUED | OUTPATIENT
Start: 2022-12-16 | End: 2022-12-16 | Stop reason: HOSPADM

## 2022-12-16 RX ORDER — GABAPENTIN 300 MG/1
300 CAPSULE ORAL 3 TIMES DAILY
Status: DISCONTINUED | OUTPATIENT
Start: 2022-12-16 | End: 2022-12-17 | Stop reason: HOSPADM

## 2022-12-16 RX ORDER — SUCCINYLCHOLINE/SOD CL,ISO/PF 100 MG/5ML
SYRINGE (ML) INTRAVENOUS AS NEEDED
Status: DISCONTINUED | OUTPATIENT
Start: 2022-12-16 | End: 2022-12-16

## 2022-12-16 RX ORDER — PANTOPRAZOLE SODIUM 40 MG/1
40 TABLET, DELAYED RELEASE ORAL
Status: DISCONTINUED | OUTPATIENT
Start: 2022-12-17 | End: 2022-12-17 | Stop reason: HOSPADM

## 2022-12-16 RX ORDER — LIDOCAINE HYDROCHLORIDE 10 MG/ML
0.5 INJECTION, SOLUTION EPIDURAL; INFILTRATION; INTRACAUDAL; PERINEURAL ONCE AS NEEDED
Status: COMPLETED | OUTPATIENT
Start: 2022-12-16 | End: 2022-12-16

## 2022-12-16 RX ORDER — GABAPENTIN 300 MG/1
600 CAPSULE ORAL ONCE
Status: COMPLETED | OUTPATIENT
Start: 2022-12-16 | End: 2022-12-16

## 2022-12-16 RX ORDER — SODIUM CHLORIDE, SODIUM LACTATE, POTASSIUM CHLORIDE, CALCIUM CHLORIDE 600; 310; 30; 20 MG/100ML; MG/100ML; MG/100ML; MG/100ML
125 INJECTION, SOLUTION INTRAVENOUS CONTINUOUS
Status: DISCONTINUED | OUTPATIENT
Start: 2022-12-16 | End: 2022-12-16

## 2022-12-16 RX ORDER — ENOXAPARIN SODIUM 100 MG/ML
40 INJECTION SUBCUTANEOUS DAILY
Status: DISCONTINUED | OUTPATIENT
Start: 2022-12-17 | End: 2022-12-17 | Stop reason: HOSPADM

## 2022-12-16 RX ORDER — LIDOCAINE HYDROCHLORIDE 20 MG/ML
INJECTION, SOLUTION EPIDURAL; INFILTRATION; INTRACAUDAL; PERINEURAL AS NEEDED
Status: DISCONTINUED | OUTPATIENT
Start: 2022-12-16 | End: 2022-12-16

## 2022-12-16 RX ORDER — CEFAZOLIN SODIUM 2 G/50ML
SOLUTION INTRAVENOUS AS NEEDED
Status: DISCONTINUED | OUTPATIENT
Start: 2022-12-16 | End: 2022-12-16

## 2022-12-16 RX ORDER — CEFAZOLIN SODIUM 1 G/3ML
INJECTION, POWDER, FOR SOLUTION INTRAMUSCULAR; INTRAVENOUS AS NEEDED
Status: DISCONTINUED | OUTPATIENT
Start: 2022-12-16 | End: 2022-12-16

## 2022-12-16 RX ORDER — ROCURONIUM BROMIDE 10 MG/ML
INJECTION, SOLUTION INTRAVENOUS AS NEEDED
Status: DISCONTINUED | OUTPATIENT
Start: 2022-12-16 | End: 2022-12-16

## 2022-12-16 RX ORDER — BACLOFEN 10 MG/1
5 TABLET ORAL
Status: DISCONTINUED | OUTPATIENT
Start: 2022-12-16 | End: 2022-12-17 | Stop reason: HOSPADM

## 2022-12-16 RX ORDER — ACETAMINOPHEN 325 MG/1
975 TABLET ORAL ONCE
Status: COMPLETED | OUTPATIENT
Start: 2022-12-16 | End: 2022-12-16

## 2022-12-16 RX ORDER — FENTANYL CITRATE/PF 50 MCG/ML
25 SYRINGE (ML) INJECTION
Status: DISCONTINUED | OUTPATIENT
Start: 2022-12-16 | End: 2022-12-16 | Stop reason: HOSPADM

## 2022-12-16 RX ORDER — HEPARIN SODIUM 5000 [USP'U]/ML
5000 INJECTION, SOLUTION INTRAVENOUS; SUBCUTANEOUS
Status: COMPLETED | OUTPATIENT
Start: 2022-12-16 | End: 2022-12-16

## 2022-12-16 RX ORDER — PROPOFOL 10 MG/ML
INJECTION, EMULSION INTRAVENOUS CONTINUOUS PRN
Status: DISCONTINUED | OUTPATIENT
Start: 2022-12-16 | End: 2022-12-16

## 2022-12-16 RX ORDER — ONDANSETRON 2 MG/ML
INJECTION INTRAMUSCULAR; INTRAVENOUS AS NEEDED
Status: DISCONTINUED | OUTPATIENT
Start: 2022-12-16 | End: 2022-12-16

## 2022-12-16 RX ORDER — DOCUSATE SODIUM 100 MG/1
100 CAPSULE, LIQUID FILLED ORAL 2 TIMES DAILY
Status: DISCONTINUED | OUTPATIENT
Start: 2022-12-16 | End: 2022-12-17 | Stop reason: HOSPADM

## 2022-12-16 RX ADMIN — CEFAZOLIN SODIUM 2000 MG: 2 SOLUTION INTRAVENOUS at 11:14

## 2022-12-16 RX ADMIN — FENTANYL CITRATE 25 MCG: 50 INJECTION, SOLUTION INTRAMUSCULAR; INTRAVENOUS at 13:49

## 2022-12-16 RX ADMIN — HEPARIN SODIUM 5000 UNITS: 5000 INJECTION INTRAVENOUS; SUBCUTANEOUS at 09:50

## 2022-12-16 RX ADMIN — PROPOFOL 100 MCG/KG/MIN: 10 INJECTION, EMULSION INTRAVENOUS at 10:56

## 2022-12-16 RX ADMIN — DEXAMETHASONE SODIUM PHOSPHATE 10 MG: 10 INJECTION, SOLUTION INTRAMUSCULAR; INTRAVENOUS at 10:49

## 2022-12-16 RX ADMIN — PHENYLEPHRINE HYDROCHLORIDE 30 MCG/MIN: 10 INJECTION INTRAVENOUS at 11:20

## 2022-12-16 RX ADMIN — KETOROLAC TROMETHAMINE 15 MG: 30 INJECTION, SOLUTION INTRAMUSCULAR at 23:57

## 2022-12-16 RX ADMIN — FENTANYL CITRATE 50 MCG: 50 INJECTION, SOLUTION INTRAMUSCULAR; INTRAVENOUS at 10:49

## 2022-12-16 RX ADMIN — PROPOFOL 100 MG: 10 INJECTION, EMULSION INTRAVENOUS at 10:51

## 2022-12-16 RX ADMIN — GABAPENTIN 600 MG: 300 CAPSULE ORAL at 08:09

## 2022-12-16 RX ADMIN — PYRIDOSTIGMINE BROMIDE 30 MG: 60 TABLET ORAL at 17:28

## 2022-12-16 RX ADMIN — PROPOFOL 50 MG: 10 INJECTION, EMULSION INTRAVENOUS at 10:55

## 2022-12-16 RX ADMIN — SUGAMMADEX 50 MG: 100 INJECTION, SOLUTION INTRAVENOUS at 13:14

## 2022-12-16 RX ADMIN — GABAPENTIN 300 MG: 300 CAPSULE ORAL at 21:39

## 2022-12-16 RX ADMIN — BACLOFEN 5 MG: 10 TABLET ORAL at 21:39

## 2022-12-16 RX ADMIN — FENTANYL CITRATE 25 MCG: 50 INJECTION, SOLUTION INTRAMUSCULAR; INTRAVENOUS at 13:45

## 2022-12-16 RX ADMIN — SODIUM CHLORIDE: 9 INJECTION, SOLUTION INTRAVENOUS at 10:56

## 2022-12-16 RX ADMIN — SUGAMMADEX 100 MG: 100 INJECTION, SOLUTION INTRAVENOUS at 13:16

## 2022-12-16 RX ADMIN — LIDOCAINE HYDROCHLORIDE 75 MG: 20 INJECTION, SOLUTION EPIDURAL; INFILTRATION; INTRACAUDAL at 10:49

## 2022-12-16 RX ADMIN — LIDOCAINE HYDROCHLORIDE 0.5 ML: 10 INJECTION, SOLUTION EPIDURAL; INFILTRATION; INTRACAUDAL; PERINEURAL at 08:25

## 2022-12-16 RX ADMIN — MIDAZOLAM 0.5 MG: 1 INJECTION INTRAMUSCULAR; INTRAVENOUS at 10:41

## 2022-12-16 RX ADMIN — PYRIDOSTIGMINE BROMIDE 30 MG: 60 TABLET ORAL at 21:39

## 2022-12-16 RX ADMIN — ACETAMINOPHEN 975 MG: 325 TABLET, FILM COATED ORAL at 21:38

## 2022-12-16 RX ADMIN — ACETAMINOPHEN 975 MG: 325 TABLET, FILM COATED ORAL at 08:09

## 2022-12-16 RX ADMIN — REMIFENTANIL HYDROCHLORIDE 0.2 MCG/KG/MIN: 1 INJECTION, POWDER, LYOPHILIZED, FOR SOLUTION INTRAVENOUS at 10:56

## 2022-12-16 RX ADMIN — TOPIRAMATE 50 MG: 25 TABLET, FILM COATED ORAL at 21:39

## 2022-12-16 RX ADMIN — PROPOFOL 200 MG: 10 INJECTION, EMULSION INTRAVENOUS at 10:49

## 2022-12-16 RX ADMIN — KETOROLAC TROMETHAMINE 15 MG: 30 INJECTION, SOLUTION INTRAMUSCULAR at 17:28

## 2022-12-16 RX ADMIN — ACETAMINOPHEN 975 MG: 325 TABLET, FILM COATED ORAL at 16:08

## 2022-12-16 RX ADMIN — GABAPENTIN 300 MG: 300 CAPSULE ORAL at 16:07

## 2022-12-16 RX ADMIN — MIDAZOLAM 0.5 MG: 1 INJECTION INTRAMUSCULAR; INTRAVENOUS at 10:36

## 2022-12-16 RX ADMIN — SODIUM CHLORIDE, POTASSIUM CHLORIDE, SODIUM LACTATE AND CALCIUM CHLORIDE 75 ML/HR: 600; 310; 30; 20 INJECTION, SOLUTION INTRAVENOUS at 23:02

## 2022-12-16 RX ADMIN — ROCURONIUM BROMIDE 10 MG: 50 INJECTION INTRAVENOUS at 11:15

## 2022-12-16 RX ADMIN — SODIUM CHLORIDE, SODIUM LACTATE, POTASSIUM CHLORIDE, AND CALCIUM CHLORIDE 125 ML/HR: .6; .31; .03; .02 INJECTION, SOLUTION INTRAVENOUS at 08:25

## 2022-12-16 RX ADMIN — MIDAZOLAM 1 MG: 1 INJECTION INTRAMUSCULAR; INTRAVENOUS at 10:48

## 2022-12-16 RX ADMIN — Medication 100 MG: at 10:50

## 2022-12-16 RX ADMIN — Medication 25 MCG: at 14:19

## 2022-12-16 RX ADMIN — SUGAMMADEX 100 MG: 100 INJECTION, SOLUTION INTRAVENOUS at 13:18

## 2022-12-16 RX ADMIN — ONDANSETRON 4 MG: 2 INJECTION INTRAMUSCULAR; INTRAVENOUS at 13:15

## 2022-12-16 RX ADMIN — SUGAMMADEX 100 MG: 100 INJECTION, SOLUTION INTRAVENOUS at 13:21

## 2022-12-16 RX ADMIN — SODIUM CHLORIDE, POTASSIUM CHLORIDE, SODIUM LACTATE AND CALCIUM CHLORIDE 75 ML/HR: 600; 310; 30; 20 INJECTION, SOLUTION INTRAVENOUS at 14:15

## 2022-12-16 NOTE — OP NOTE
OPERATIVE REPORT  PATIENT NAME: Radha Grullon    :  2000  MRN: [id number]  Pt Location: [facility] OR ROOM 14    SURGERY DATE: 2022    Surgeon(s) and Role:     * Deyvi Vernon MD - Primary     * Diana Larson PA-C - Assisting     * Nakul Sheikh PA-C - Assisting     * Niru Esparza PA-C - Messi    Preop Diagnosis:  Muscle weakness [M62 81]    Post-Op Diagnosis Codes:     * Muscle weakness [M62 81]    Procedure(s) (LRB):  THYMECTOMY THORACOSCOPIC W/ ROBOTICS (Left)  BRONCHOSCOPY FLEXIBLE (N/A)   1  Robotic complete thymectomy via left chest approach  2  Bronchoscopy  3  Left T3-T10 intercostal nerve block with Exparel    Specimen(s):  ID Type Source Tests Collected by Time Destination   1 : Right Inferior Horn Tissue Soft Tissue, Other TISSUE EXAM Deyvi Vernon MD 2022 1202    2 : Complete Thymectomy Tissue Thymus TISSUE EXAM Deyvi Vernon MD 2022 1256        Estimated Blood Loss:   Minimal    Drains:  Y Chest Tube 1 and 2 Left Pleural 24 Fr  (Active)   Number of days: 0       Anesthesia Type:   General    Operative Indications:  Muscle weakness []  49-year-old female with myasthenia gravis and an anterior mediastinal mass likely thymoma    Operative Findings:  Significant thymic mass  Completely resected thymus  Complications:   None    Procedure and Technique:  After obtaining informed consent from the patient, they were transferred to the operating room and placed supine on the operating table  Bilateral SCDs were placed and turned on prior to induction of general anesthesia  General anesthesia was then induced and a double-lumen endotracheal tube was placed without incident  Positioning of the double-lumen tube was verified with a pediatric bronchoscope  The patient was then positioned with the right arm at their side, a bump over under the left chest and their left arm in a padded sling  All bony prominences were padded and checked    Positioning of double-lumen endotracheal tube was verified at this time      Next a formal time-out was called verifying patient , date of birth, procedure, consent, antibiotics, beta-blocker as indicated, anticipated specimens with handling, SCD use and other special considerations       The left chest was then prepped and draped in the standard sterile fashion  Anesthesia clamped and placed suction to the left lobe at this time to help desufflate  Bony landmarks were identified and planned incisions were mapped out  An 8 mm incision was made in the anterior axillary line below the inframammary fold  The robotic port was inserted without complication and the camera was inserted verifying that we were in the thoracic cavity  Insufflation was initiated to 10 mmHg  A thorough thoracoscopy was carried out at this time  There were no significant adhesions  Next an intercostal nerve block was performed using 60 mL of a mixture 1/2 percent Marcaine, normal saline and liposomal bupivacaine (Exparel)  Rib spaces 3 through 10 were identified and using a percutaneous approach a block was placed into each of these ribs spaces with approximately 5mL of this mixture  The remainder of the robotic ports were placed at this time  An 8 mm incision was made anteriorly 1 handbreadth superior of the camera port and the port was inserted  A 2nd 8mm working port incision was made 1 handbreadth inferior along the anterior axillary line under direct visualization  Finally a 12 mm assistant port was placed anteriorly above the diaphragm  The robot was docked at this time  A caudier forceps was inserted into arm 2, [place] bipolar grasper into arm 4 and the camera into arm 3  I un-scrubbed at this time and went to the console      We started by identifying the left phrenic nerve  The pericardial fat and thymus was grasped medially to the phrenic nerve staying    We had to dissect this directly off of the nerve as the thymic mass was growing very close to this  We were able to spare the nerve completely  We opened up the pleura along this line and dissected this off of the pericardium  We continued our dissection superiorly towards the sternal notch  We then dissected the thymus anteriorly off of the posterior aspect of the sternum using bipolar cautery  This was continued down to the inferior most aspect of the thymus gland  We then fully dissected the thymus off of the pericardium moving towards the right chest   The right inferior thymic horn came off separately and we removed this and sent this separate for permanent pathology  Then dissected towards the right pleural space  We actually went into the right pleura  We could clearly identify the phrenic nerve  Stayed well away from this  We continued our thymus dissection off of the pericardium back up towards the superior vena cava  All that remained were the cervical attachments and innominate vein attachments  From the underneath portion of the thymus we mobilized this off of the innominate vein  There were 2 small vein branches to the thymus that we took with bipolar cautery  Once the thymus was completely mobilized off the vein we dissected to have the neck making sure to fully dissect out the thymic horns  This completed our total thymectomy  The mass was placed into a 12mm thoracoscopic bags and removed intact through the assistant port  This was went for permanent pathology      The left chest was thoroughly explored, irrigated and aspirated dry  There was no appreciable bleeding seen  A 24 English channel drain was inserted through the inferior most robotic incision and placed in the left pleural space  This was secured in place with a drain stitch  All robotic ports were removed under direct visualization and inspected for bleeding  Once satisfied with hemostasis, the left lung was was then insufflated by anesthesia   This came up without incident and the camera was removed at this time      The robotic ports were closed with 3-0 Vicryl in the soft tissue and 4 0 Monocryl for skin  Surgical glue was applied to all incisions  The channel drain was placed to waterseal      All needle, instrument, and sponge counts were correct at the conclusion of the case  The patient extubated in the operating room and was transferred safely to the stretcher  They were transferred to the PACU in stable condition       RENE Potter was scrubbed as a bedside assistant for the entirety of the procedure as no other qualified assistant or general surgery resident was available  She was critical to the performance of this operation as she was the bedside robotic assistant  In doing so, she aided with retraction, specimen removal, irrigation, suctioning and instrument exchange        I was present for the entire procedure, A qualified resident physician was not available and A physician assistant was required during the procedure for retraction tissue handling,dissection and suturing    Patient Disposition:  PACU         SIGNATURE: Giovanny Santoyo MD  DATE: December 16, 2022  TIME: 1:54 PM

## 2022-12-16 NOTE — ANESTHESIA POSTPROCEDURE EVALUATION
Post-Op Assessment Note    CV Status:  Stable  Pain Score: 5    Pain management: inadequate     Mental Status:  Awake   Hydration Status:  Stable   PONV Controlled:  None   Airway Patency:  Patent      Post Op Vitals Reviewed: Yes      Staff: CRNA   Comments: Pt talking in full sentences  Following commands  No notable events documented      /69 (12/16/22 1339)    Temp (!) 97 4 °F (36 3 °C) (12/16/22 1339)    Pulse 94 (12/16/22 1339)   Resp 16 (12/16/22 1339)    SpO2 100 % (12/16/22 1339) 2L NC

## 2022-12-16 NOTE — CONSULTS
NEUROLOGY RESIDENCY CONSULT NOTE     Name: Ronald Hurtado   Age & Sex: 25 y o  female   MRN: [id number]  Unit/Bed#: [facility] 408-01   Encounter: [id number]  Length of Stay: Manpreet Márquez will need follow up in in 4 weeks with neuromuscular attending she already follows with Dr Sean Armenta  She will not require outpatient neurological testing  Pending discharge: Per Primary Team    ASSESSMENT & PLAN     * Myasthenia gravis s/p thymectomy  Assessment & Plan  20-year-old female who follows with [facility] for myasthenia gravis now POD #1 from elective thymectomy  - Pyridostigmine 30 mg 3 times daily  - No other disease modifying therapies currently used    Generalized myasthenia gravis status post robotic thymectomy without complication, would expect slow improvement over the next several weeks to her baseline myasthenia symptoms    Plan:  - Continue pyridostigmine 30 mg 3 times daily  - Postop and wound monitoring as per surgical team  - No further IP neurology recommendations, please contact us for any further questions  - Will follow up with neurology as OP with Dr Sean Armenta in 4 weeks  SUBJECTIVE     Reason for Consult / Principal Problem: Routine consult status post thymectomy for myasthenia gravis  Hx and PE limited by: Nothing    Ronald Hurtado is a 25 y o  female presenting for elective thymectomy in the setting of generalized myasthenia gravis  Neurology consulted for routine postop checks and management  Pt has a medical history significant for myasthenia gravis  Josephine Workman follows outpatient with Dr Sean Armenta  During outpatient work-up for myasthenia gravis she was noted to 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 and was referred to CT surgery for elective procedure  She is currently POD #1 from robotic thymectomy, the procedure was tolerated well with no complications  Only complaint is pain with deep breathing which is improving throughout the day    She was restarted on her home M[place]on, she was not on other disease modifying medications  NIF and VC are ordered as per protocol  She has no other concerns or questions at time of evaluation  She understands that it can take some time (weeks to months) to see improvement in her myasthenia symptoms s/p thymectomy  Night Resident discussed with patient and family at bedside, all questions answered to their satisfaction  On AM visitation the patient was resting comfortably in bedside chair with father at bedside  The patient reports feeling well, is excited to move on in her treatment plan and and does not report any new complaints nor concerns at this time  Inpatient consult to Neurology  Consult performed by: Chris Chan MD  Consult ordered by: Toro Christensen PA-C        Historical Information   Past Medical History:   Diagnosis Date   • COVID 08/2022    Cold sx   • Depression    • History of migraine    • Low vitamin D level    • Uses birth control      Past Surgical History:   Procedure Laterality Date   • ADENOIDECTOMY     • TONSILLECTOMY AND ADENOIDECTOMY       Social History   Social History     Substance and Sexual Activity   Alcohol Use Yes   • Alcohol/week: 10 0 standard drinks   • Types: 10 Standard drinks or equivalent per week    Comment: social     Social History     Substance and Sexual Activity   Drug Use Never     E-Cigarette/Vaping   • E-Cigarette Use Never User      E-Cigarette/Vaping Substances     Social History     Tobacco Use   Smoking Status Never   Smokeless Tobacco Never     Family History:   Family History   Problem Relation Age of Onset   • No Known Problems Mother    • Diabetes Father    • Hypertension Father    • Hypertension Maternal Grandmother    • Breast cancer Paternal Grandmother      Meds/Allergies   PTA meds:   Prior to Admission Medications   Prescriptions Last Dose Informant Patient Reported? Taking?    Baclofen 5 MG TABS 12/15/2022 at 2300  No Yes   Sig: Take 1 po HS   Fiber Complete TABS Past Week  Yes Yes   Sig: Take 2 tablets by mouth daily   Levonorgestrel (MIRENA) 20 MCG/DAY IUD 2022  Yes Yes   Si each by Intrauterine route once   cholecalciferol (VITAMIN D3) 1,000 units tablet Past Week  Yes Yes   Sig: Take 1,000 Units by mouth daily   ergocalciferol (VITAMIN D2) 50,000 units Past Week  No Yes   Sig: Take 1 capsule (50,000 Units total) by mouth once a week   ferrous sulfate 325 (65 Fe) mg tablet Past Week  Yes Yes   Sig: Take 325 mg by mouth daily at bedtime   ondansetron (Zofran ODT) 4 mg disintegrating tablet Past Month  No Yes   Sig: Take 1 tablet (4 mg total) by mouth every 6 (six) hours as needed for nausea or vomiting   pyridostigmine (MESTINON) 60 mg tablet 12/15/2022 at 2000  No Yes   Sig: TAKE 1/2 TAB BY MOUTH 3 TIMES A DAY   rizatriptan (Maxalt-MLT) 10 mg disintegrating tablet Past Month  No Yes   Sig: Take 1 tablet (10 mg total) by mouth as needed for migraine Take at the onset of migraine; if symptoms continue or return, may take another dose at least 2 hours after first dose  Take no more than 2 doses in a day  topiramate (TOPAMAX) 25 mg tablet 12/15/2022 at 2300  No Yes   Si tab po qhs      Facility-Administered Medications: None     Allergies   Allergen Reactions   • Augmentin [Amoxicillin-Pot Clavulanate] Diarrhea   • Fish-Derived Products - Food Allergy Vomiting     Review of previous medical records was completed  Review of Systems   Constitutional: Negative for activity change  Eyes: Negative for visual disturbance  Respiratory: Negative for cough and shortness of breath  Cardiovascular: Positive for chest pain  Gastrointestinal: Negative for abdominal pain  Musculoskeletal: Negative for back pain and neck pain  Neurological: Negative for dizziness, tremors, seizures, syncope, facial asymmetry, speech difficulty, weakness, light-headedness and numbness  Psychiatric/Behavioral: Negative for agitation       10 point review of systems conducted and otherwise negative other than as documented above    OBJECTIVE     Patient ID: Moris Herrera is a 25 y o  female  Vitals:   Vitals:    12/17/22 0237 12/17/22 0600 12/17/22 0736 12/17/22 1133   BP: 128/76  132/87 110/69   BP Location: Right arm  Right arm Right arm   Pulse: 92  97 95   Resp: 18  18 18   Temp: 97 9 °F (36 6 °C)  99 °F (37 2 °C) 98 3 °F (36 8 °C)   TempSrc: Oral  Oral Oral   SpO2: 99%  100% 100%   Weight:  86 4 kg (190 lb 7 6 oz)     Height:          Body mass index is 29 83 kg/m²  Intake/Output Summary (Last 24 hours) at 12/17/2022 1328  Last data filed at 12/17/2022 1158  Gross per 24 hour   Intake 1925 ml   Output 2350 ml   Net -425 ml     Neurologic Exam     Mental Status   Oriented to person  Oriented to place  Oriented to country  Oriented to year, month and date  Follows 2 step commands  Attention: normal  Concentration: normal    Speech: speech is normal   Level of consciousness: alert  Knowledge: good  Able to perform simple calculations  Able to name object  Able to repeat  Normal comprehension  Cranial Nerves     CN II   Right visual field deficit: none  Left visual field deficit: none     CN III, IV, VI   Pupils are equal, round, and reactive to light  Extraocular motions are normal    Right pupil: Size: 3 mm  Left pupil: Size: 3 mm     CN III: no CN III palsy  CN VI: no CN VI palsy  Nystagmus: none   Upgaze: normal  Downgaze: normal    CN V   Right facial sensation deficit: none  Left facial sensation deficit: none    CN VII   Right facial weakness: none  Left facial weakness: none    CN VIII   Hearing: intact    CN IX, X   Palate: symmetric    CN XI   Right sternocleidomastoid strength: normal  Left sternocleidomastoid strength: normal  Right trapezius strength: normal  Left trapezius strength: normal    CN XII   Tongue: not atrophic  Fasciculations: absent  Tongue deviation: none    Motor Exam   Muscle bulk: normal  Overall muscle tone: normal  Right arm pronator drift: absent  Left arm pronator drift: absent  - Antigravity in all limbs     Sensory Exam   Light touch normal    Vibration normal      Gait, Coordination, and Reflexes     Coordination   Finger to nose coordination: normal  Heel to shin coordination: normal    Tremor   Resting tremor: absent    Reflexes   Reflexes 2+ except as noted  Right Verma: absent  Left Verma: absent  Right ankle clonus: absent  Left ankle clonus: absent  - Patient observed walking with Physical Therapy  LABORATORY DATA     Labs: I have personally reviewed pertinent reports  Results from last 7 days   Lab Units 12/17/22  0356 12/16/22  1448   WBC Thousand/uL 15 23* 9 94   HEMOGLOBIN g/dL 12 3 13 2   HEMATOCRIT % 37 6 41 7   PLATELETS Thousands/uL 247 245      Results from last 7 days   Lab Units 12/17/22  0356 12/16/22  1448   POTASSIUM mmol/L 3 5 3 9   CHLORIDE mmol/L 114* 113*   CO2 mmol/L 22 23   BUN mg/dL 10 13   CREATININE mg/dL 0 60 0 97   CALCIUM mg/dL 8 8 8 9               IMAGING & DIAGNOSTIC TESTING     Radiology Results: I have personally reviewed pertinent films in PACS    XR chest portable   Final Result by Fiona Nielsen MD (12/16 1428)      No acute cardiopulmonary disease  Postoperative chest tube placement               Workstation performed: [id number]         XR chest pa & lateral    (Results Pending)     Other Diagnostic Testing: I have personally reviewed pertinent reports        ACTIVE MEDICATIONS     Current Facility-Administered Medications   Medication Dose Route Frequency   • acetaminophen (TYLENOL) tablet 975 mg  975 mg Oral Q6H   • baclofen tablet 5 mg  5 mg Oral HS   • docusate sodium (COLACE) capsule 100 mg  100 mg Oral BID   • enoxaparin (LOVENOX) subcutaneous injection 40 mg  40 mg Subcutaneous Daily   • gabapentin (NEURONTIN) capsule 300 mg  300 mg Oral TID   • HYDROmorphone (DILAUDID) injection 0 5 mg  0 5 mg Intravenous Q2H PRN   • ondansetron (ZOFRAN) injection 4 mg  4 mg Intravenous Q6H PRN   • oxyCODONE (ROXICODONE) IR tablet 5 mg  5 mg Oral Q4H PRN   • pantoprazole (PROTONIX) EC tablet 40 mg  40 mg Oral Early Morning   • polyethylene glycol (MIRALAX) packet 17 g  17 g Oral Daily   • pyridostigmine (MESTINON) tablet 30 mg  30 mg Oral TID   • senna (SENOKOT) tablet 8 6 mg  1 tablet Oral Daily   • topiramate (TOPAMAX) tablet 50 mg  50 mg Oral HS     Prior to Admission medications    Medication Sig Start Date End Date Taking? Authorizing Provider   Baclofen 5 MG TABS Take 1 po HS 8/2/22  Yes Jordan Young PA-C   cholecalciferol (VITAMIN D3) 1,000 units tablet Take 1,000 Units by mouth daily   Yes Historical Provider, MD   ergocalciferol (VITAMIN D2) 50,000 units Take 1 capsule (50,000 Units total) by mouth once a week 8/26/22  Yes Jordan Young PA-C   ferrous sulfate 325 (65 Fe) mg tablet Take 325 mg by mouth daily at bedtime   Yes Historical Provider, MD   Fiber Complete TABS Take 2 tablets by mouth daily   Yes Historical Provider, MD   Levonorgestrel (MIRENA) 20 MCG/DAY IUD 1 each by Intrauterine route once   Yes Historical Provider, MD   ondansetron (Zofran ODT) 4 mg disintegrating tablet Take 1 tablet (4 mg total) by mouth every 6 (six) hours as needed for nausea or vomiting 5/26/22  Yes Cee Israel MD   pyridostigmine (MESTINON) 60 mg tablet TAKE 1/2 TAB BY MOUTH 3 TIMES A DAY 11/18/22  Yes Jordan Young PA-C   rizatriptan (Maxalt-MLT) 10 mg disintegrating tablet Take 1 tablet (10 mg total) by mouth as needed for migraine Take at the onset of migraine; if symptoms continue or return, may take another dose at least 2 hours after first dose  Take no more than 2 doses in a day   8/2/22  Yes Jordan Yuong PA-C   topiramate (TOPAMAX) 25 mg tablet 2 tab po qhs 9/23/22  Yes Tana Madison DO     CODE STATUS & ADVANCED DIRECTIVES     Code Status: Level 1 - Full Code  Advance Directive and Living Will:      Power of :    POLST:      ==  Lugenia Leyden, DO [facility] Residency, PGY-2

## 2022-12-16 NOTE — ANESTHESIA PREPROCEDURE EVALUATION
Procedure:  THYMECTOMY THORACOSCOPIC W/ ROBOTICS (Left: Chest)  BRONCHOSCOPY FLEXIBLE ([place])    Relevant Problems   CARDIO   (+) Migraine without aura and without status migrainosus, not intractable      HEMATOLOGY   (+) Iron deficiency anemia      NEURO/PSYCH   (+) Frequent headaches   (+) Migraine without aura and without status migrainosus, not intractable      PULMONARY   (+) Obstructive sleep apnea      Other   (+) Hypertrophy of adenoids        Physical Exam    Airway    Mallampati score: II  TM Distance: >3 FB  Neck ROM: full     Dental   No notable dental hx     Cardiovascular      Pulmonary      Other Findings        Anesthesia Plan  ASA Score- 3     Anesthesia Type- general with ASA Monitors  Additional Monitors:   Airway Plan: ETT  Plan Factors-Exercise tolerance (METS): >4 METS  Chart reviewed  Existing labs reviewed  Patient summary reviewed  Patient is not a current smoker  There is medical exclusion for perioperative obstructive sleep apnea risk education  Induction- intravenous  Postoperative Plan- Plan for postoperative opioid use  Informed Consent- Anesthetic plan and risks discussed with patient  I personally reviewed this patient with the CRNA  Discussed and agreed on the Anesthesia Plan with the CRNA  Abraham Montiel

## 2022-12-16 NOTE — QUICK NOTE
Post Op Check:    Yunior Talbot is a 25 y o  female s/p 12/16 robotic thymectomy    Saw patient at the bedside once they arrived to the floor  Patients pain is well controlled  Pain with deep breathing  She denied any nausea, chest pain, or shortness of breath       GEN: NAD, A&O  Chest: CT x 2 to ws, no air leak  Abd: S, ND, NT    Plan:  Diet Regular; Regular House  Continue to monitor  Pain and nausea control PRN  Continue ct to water seal    Pierre Santillan MD  Surgery, PGY-2

## 2022-12-16 NOTE — ASSESSMENT & PLAN NOTE
49-year-old female who follows with [facility] neurology for myasthenia gravis now POD #1 from elective thymectomy  - Pyridostigmine 30 mg 3 times daily  - No other disease modifying therapies currently used    Generalized myasthenia gravis status post robotic thymectomy without complication, would expect slow improvement over the next several weeks to her baseline myasthenia symptoms    Plan:  - Continue pyridostigmine 30 mg 3 times daily  - Postop and wound monitoring as per surgical team  - No further IP neurology recommendations, please contact us for any further questions  - Will follow up with neurology as OP with Dr Abi Gonzales in 4 weeks

## 2022-12-17 ENCOUNTER — APPOINTMENT (OUTPATIENT)
Dept: RADIOLOGY | Facility: HOSPITAL | Age: 22
End: 2022-12-17

## 2022-12-17 VITALS
HEART RATE: 88 BPM | DIASTOLIC BLOOD PRESSURE: 68 MMHG | HEIGHT: 67 IN | BODY MASS INDEX: 29.9 KG/M2 | RESPIRATION RATE: 18 BRPM | WEIGHT: 190.48 LBS | SYSTOLIC BLOOD PRESSURE: 118 MMHG | TEMPERATURE: 98.3 F | OXYGEN SATURATION: 100 %

## 2022-12-17 LAB
ANION GAP SERPL CALCULATED.3IONS-SCNC: 6 MMOL/L (ref 4–13)
BUN SERPL-MCNC: 10 MG/DL (ref 5–25)
CALCIUM SERPL-MCNC: 8.8 MG/DL (ref 8.3–10.1)
CHLORIDE SERPL-SCNC: 114 MMOL/L (ref 96–108)
CO2 SERPL-SCNC: 22 MMOL/L (ref 21–32)
CREAT SERPL-MCNC: 0.6 MG/DL (ref 0.6–1.3)
ERYTHROCYTE [DISTWIDTH] IN BLOOD BY AUTOMATED COUNT: 12.1 % (ref 11.6–15.1)
GFR SERPL CREATININE-BSD FRML MDRD: 129 ML/MIN/1.73SQ M
GLUCOSE SERPL-MCNC: 120 MG/DL (ref 65–140)
HCT VFR BLD AUTO: 37.6 % (ref 34.8–46.1)
HGB BLD-MCNC: 12.3 G/DL (ref 11.5–15.4)
MAGNESIUM SERPL-MCNC: 1.8 MG/DL (ref 1.6–2.6)
MCH RBC QN AUTO: 28.9 PG (ref 26.8–34.3)
MCHC RBC AUTO-ENTMCNC: 32.7 G/DL (ref 31.4–37.4)
MCV RBC AUTO: 89 FL (ref 82–98)
PLATELET # BLD AUTO: 247 THOUSANDS/UL (ref 149–390)
PMV BLD AUTO: 10.1 FL (ref 8.9–12.7)
POTASSIUM SERPL-SCNC: 3.5 MMOL/L (ref 3.5–5.3)
RBC # BLD AUTO: 4.25 MILLION/UL (ref 3.81–5.12)
SODIUM SERPL-SCNC: 142 MMOL/L (ref 135–147)
WBC # BLD AUTO: 15.23 THOUSAND/UL (ref 4.31–10.16)

## 2022-12-17 RX ORDER — PYRIDOSTIGMINE BROMIDE 60 MG/1
30 TABLET ORAL 3 TIMES DAILY
Status: DISCONTINUED | OUTPATIENT
Start: 2022-12-17 | End: 2022-12-17 | Stop reason: HOSPADM

## 2022-12-17 RX ORDER — OXYCODONE HYDROCHLORIDE 5 MG/1
5 TABLET ORAL EVERY 4 HOURS PRN
Qty: 20 TABLET | Refills: 0 | Status: SHIPPED | OUTPATIENT
Start: 2022-12-17 | End: 2022-12-17 | Stop reason: SDUPTHER

## 2022-12-17 RX ORDER — POTASSIUM CHLORIDE 20 MEQ/1
40 TABLET, EXTENDED RELEASE ORAL ONCE
Status: COMPLETED | OUTPATIENT
Start: 2022-12-17 | End: 2022-12-17

## 2022-12-17 RX ORDER — OXYCODONE HYDROCHLORIDE 5 MG/1
5 TABLET ORAL EVERY 4 HOURS PRN
Qty: 20 TABLET | Refills: 0 | Status: SHIPPED | OUTPATIENT
Start: 2022-12-17 | End: 2022-12-18

## 2022-12-17 RX ADMIN — DOCUSATE SODIUM 100 MG: 100 CAPSULE, LIQUID FILLED ORAL at 09:15

## 2022-12-17 RX ADMIN — GABAPENTIN 300 MG: 300 CAPSULE ORAL at 09:15

## 2022-12-17 RX ADMIN — PYRIDOSTIGMINE BROMIDE 30 MG: 60 TABLET ORAL at 15:31

## 2022-12-17 RX ADMIN — KETOROLAC TROMETHAMINE 15 MG: 30 INJECTION, SOLUTION INTRAMUSCULAR at 05:58

## 2022-12-17 RX ADMIN — ACETAMINOPHEN 975 MG: 325 TABLET, FILM COATED ORAL at 09:15

## 2022-12-17 RX ADMIN — KETOROLAC TROMETHAMINE 15 MG: 30 INJECTION, SOLUTION INTRAMUSCULAR at 11:58

## 2022-12-17 RX ADMIN — PYRIDOSTIGMINE BROMIDE 30 MG: 60 TABLET ORAL at 09:16

## 2022-12-17 RX ADMIN — GABAPENTIN 300 MG: 300 CAPSULE ORAL at 15:36

## 2022-12-17 RX ADMIN — ACETAMINOPHEN 975 MG: 325 TABLET, FILM COATED ORAL at 14:41

## 2022-12-17 RX ADMIN — ENOXAPARIN SODIUM 40 MG: 40 INJECTION SUBCUTANEOUS at 09:15

## 2022-12-17 RX ADMIN — PANTOPRAZOLE SODIUM 40 MG: 40 TABLET, DELAYED RELEASE ORAL at 05:59

## 2022-12-17 RX ADMIN — POTASSIUM CHLORIDE 40 MEQ: 1500 TABLET, EXTENDED RELEASE ORAL at 09:43

## 2022-12-17 RX ADMIN — SENNOSIDES 8.6 MG: 8.6 TABLET, FILM COATED ORAL at 09:15

## 2022-12-17 RX ADMIN — POLYETHYLENE GLYCOL 3350 17 G: 17 POWDER, FOR SOLUTION ORAL at 09:15

## 2022-12-17 NOTE — PROGRESS NOTES
Progress Note - Thoracic Surgery   Kong Manzo 25 y o  female MRN: [id number]  Unit/Bed#: [facility] 408-01 Encounter: [id number]    Assessment:  Kong Manzo is a 25 y o  female s/p 12/16 robotic thymectomy for myasthenia gravis    CT ws, -AL, 150 cc ss    Plan:  Regular  LR @ 75, dc ivf  L CT continue water seal      Subjective/Objective     Subjective:   No acute events overnight  Pain controlled  -N  -V      Objective:     Blood pressure 138/70, pulse 91, temperature 98 4 °F (36 9 °C), temperature source Oral, resp  rate 17, height 5' 7" (1 702 m), weight 86 2 kg (190 lb), SpO2 99 %  ,Body mass index is 29 76 kg/m²  Intake/Output Summary (Last 24 hours) at 12/17/2022 0053  Last data filed at 12/17/2022 0001  Gross per 24 hour   Intake 2007 5 ml   Output 1500 ml   Net 507 5 ml       Invasive Devices     Peripheral Intravenous Line  Duration           Peripheral IV 12/16/22 Dorsal (posterior); Left Wrist <1 day    Peripheral IV 12/16/22 Right Hand <1 day          Drain  Duration           Y Chest Tube 1 and 2 Left Pleural 24 Fr  <1 day                Physical Exam:   Gen: NAD, Comfortable  Neuro: A&O, No focal deficits  Head: Normal Cephalic, Atraumatic  Eye: EOMI, PERRLA, No scleral icterus  Neck: Supple, No JVD, Midline trachea  CV: RRR, Cap refill <2 sec  Pulm: Normal work of breathing, no respiratory distress  Abd: Soft, Non-Distended, Non-Tender  Ext: No edema, Non-tender  Skin: warm, dry, intact

## 2022-12-17 NOTE — DISCHARGE SUMMARY
Discharge Summary - Thoracic Surgery   Cary Botello 25 y o  female MRN: [id number]  Unit/Bed#: [facility] 408-01 Encounter: [id number]    Admission Date:   Admission Orders (From admission, onward)     Ordered        12/16/22 1356  Inpatient Admission  Once                         Discharge Date: 12/17/22    Admitting Diagnosis: Muscle weakness [M62 81]    Discharge Diagnosis: myasthenia gravis    Medical Problems     Resolved Problems  Date Reviewed: 12/17/2022   None         Attending: Dr Adella Kussmaul): Nayeli Watters MD - Neurology     Procedures Performed: No orders of the defined types were placed in this encounter  Pathology: thymoma, report pending     Hospital Course: Patient was admitted following a successful robotically assisted thymectomy for her myasthenia gravis done electively  Neurology was consulted postoperatively for recommendations regarding her myasthenia medications and instructed her to continue on pyridostigmine  Operatively, she had a reassuring examination, stable vital signs, and minimal chest tube output on waterseal without air leak  Tube was discontinued postoperative day 1 and a post removal x-ray appeared reassuring  He was seen on rounds by her thoracic surgical team and deemed suitable for discharge to home  Her to her departure from the hospital, provisions fourth surgical follow-up, medication reconciliation, and neurology follow-up with recommendations were made  Condition at Discharge: good     Discharge instructions/Information to patient and family:   See after visit summary for information provided to patient and family  Provisions for Follow-Up Care:  See after visit summary for information related to follow-up care and any pertinent home health orders  Disposition: Home          Planned Readmission: No    Discharge Statement   I spent 29 minutes discharging the patient  This time was spent on the day of discharge   I had direct contact with the patient on the day of discharge  Additional documentation is required if more than 30 minutes were spent on discharge  Discharge Medications:  See after visit summary for reconciled discharge medications provided to patient and family

## 2022-12-17 NOTE — QUICK NOTE
12/17/22       Procedure: Chest tube removal     L chest tube removed in routine fashion without incident  The patient tolerated the procedure well  A dry, sterile dressing was placed  PA/Lateral Chest Xray ordered

## 2022-12-17 NOTE — UTILIZATION REVIEW
Initial Clinical Review    Elective IP surgical procedure  Age/Sex: 25 y o  female  Surgery Date: 12/16/2022  Procedure:   THYMECTOMY THORACOSCOPIC W/ ROBOTICS (Left)  BRONCHOSCOPY FLEXIBLE (N/A)   1  Robotic complete thymectomy via left chest approach  2  Bronchoscopy  3  Left T3-T10 intercostal nerve block with Exparel  Anesthesia: General  Operative Findings: Significant thymic mass  Completely resected thymus        POD#1 Progress Note: No acute events overnight  Pain controlled  -N  -V  CT ws, -AL, 150 cc ss  Regular diet  LR @ 75 ml/hr  Analgesics/antiemetics prn   L CT continue to water seal  SCDs, lovenox sq daily         Admission Orders: Date/Time/Statement:   Admission Orders (From admission, onward)     Ordered        12/16/22 1356  Inpatient Admission  Once                      Orders Placed This Encounter   Procedures   • Inpatient Admission     Standing Status:   Standing     Number of Occurrences:   1     Order Specific Question:   Level of Care     Answer:   Level 1 Stepdown [13]     Order Specific Question:   Estimated length of stay     Answer:   Inpatient Only Surgery     Vital Signs:   Date/Time Temp Pulse Resp BP MAP (mmHg) SpO2 Calculated FIO2 (%) - Nasal Cannula O2 Flow Rate (L/min) Nasal Cannula O2 Flow Rate (L/min) O2 Device Patient Position - Orthostatic VS   12/17/22 0736 99 °F (37 2 °C) 97 18 132/87 103 100 % -- -- -- Nasal cannula Sitting   12/17/22 0237 97 9 °F (36 6 °C) 92 18 128/76 97 99 % 28 -- 2 L/min Nasal cannula Lying   12/16/22 2200 98 4 °F (36 9 °C) 91 17 138/70 90 99 % 28 -- 2 L/min Nasal cannula Lying   12/16/22 2015 -- -- -- -- -- 99 % -- -- -- -- --   12/16/22 2012 -- -- -- -- -- 99 % -- -- -- -- --   12/16/22 1900 98 2 °F (36 8 °C) 109 Abnormal  18 137/77 90 99 % 28 -- 2 L/min Nasal cannula Lying   12/16/22 1800 -- 105 -- 140/91 109 -- -- -- -- -- --   12/16/22 1600 -- 96 -- 127/84 100 -- -- -- -- -- --   12/16/22 1545 98 1 °F (36 7 °C) 96 17 118/93 100 100 % 28 -- 2 L/min Nasal cannula Lying   12/16/22 1530 -- 98 -- 137/92 105 100 % 28 -- 2 L/min Nasal cannula --   12/16/22 1500 -- 98 15 136/81 96 100 % -- -- -- -- --   12/16/22 1452 -- 94 17 130/81 92 100 % 28 -- 2 L/min Nasal cannula --   12/16/22 1445 97 5 °F (36 4 °C) 88 16 130/81 -- 100 % 28 -- 2 L/min Nasal cannula --   12/16/22 1430 -- 94 14 130/80 91 100 % -- -- -- -- --   12/16/22 1415 -- 86 14 117/70 89 100 % -- -- -- -- --   12/16/22 1400 -- 92 19 120/73 89 100 % -- -- -- -- --   12/16/22 1345 -- 94 20 121/70 84 100 % 28 -- 2 L/min Nasal cannula --   12/16/22 1339 97 4 °F (36 3 °C) Abnormal  94 16 115/69 86 100 % -- 6 L/min -- Simple mask --   12/16/22 0809 97 8 °F (36 6 °C) 78 18 -- -- 99 % -- -- -- None (Room air) --     Pertinent Labs/Diagnostic Test Results:   XR chest portable   Final Result by Hari Garcia MD (12/16 1428)      No acute cardiopulmonary disease        Postoperative chest tube placement            Results from last 7 days   Lab Units 12/17/22  0356 12/16/22  1448   WBC Thousand/uL 15 23* 9 94   HEMOGLOBIN g/dL 12 3 13 2   HEMATOCRIT % 37 6 41 7   PLATELETS Thousands/uL 247 245     Results from last 7 days   Lab Units 12/17/22  0356 12/16/22  1448   SODIUM mmol/L 142 141   POTASSIUM mmol/L 3 5 3 9   CHLORIDE mmol/L 114* 113*   CO2 mmol/L 22 23   ANION GAP mmol/L 6 5   BUN mg/dL 10 13   CREATININE mg/dL 0 60 0 97   EGFR ml/min/1 73sq m 129 83   CALCIUM mg/dL 8 8 8 9   MAGNESIUM mg/dL 1 8 2 3     Results from last 7 days   Lab Units 12/17/22  0356 12/16/22  1448   GLUCOSE RANDOM mg/dL 120 121       Scheduled Medications:  acetaminophen, 975 mg, Oral, Q6H  baclofen, 5 mg, Oral, HS  docusate sodium, 100 mg, Oral, BID  enoxaparin, 40 mg, Subcutaneous, Daily  gabapentin, 300 mg, Oral, TID  ketorolac, 15 mg, Intravenous, Q6H CRISTY  pantoprazole, 40 mg, Oral, Early Morning  polyethylene glycol, 17 g, Oral, Daily  pyridostigmine, 30 mg, Oral, TID  senna, 1 tablet, Oral, Daily  topiramate, 50 mg, Oral, HS      PRN Meds:  HYDROmorphone, 0 5 mg, Intravenous, Q2H PRN  ondansetron, 4 mg, Intravenous, Q6H PRN  oxyCODONE, 5 mg, Oral, Q4H PRN        Network Utilization Review Department  ATTENTION: Please call with any questions or concerns to [phone] and carefully listen to the prompts so that you are directed to the right person  All voicemails are confidential   Romain Rush all requests for admission clinical reviews, approved or denied determinations and any other requests to dedicated fax number below belonging to the campus where the patient is receiving treatment   List of dedicated fax numbers for the Facilities:  [address] DENIALS (Administrative/Medical Necessity) [phone]   [address] (Maternity/NICU/Pediatrics) [phone]   912 Lizzie Ohara [phone]   [facility] [place] 77 [phone]   [address] [address] [facility] 28 [phone]   [facility]9 [facility] Melvi Hernandez [facility] 134 815 [facility] [phone]

## 2022-12-18 ENCOUNTER — TELEPHONE (OUTPATIENT)
Dept: OTHER | Facility: OTHER | Age: 22
End: 2022-12-18

## 2022-12-18 DIAGNOSIS — M62.81 MUSCLE WEAKNESS: Primary | ICD-10-CM

## 2022-12-18 RX ORDER — OXYCODONE HYDROCHLORIDE 5 MG/1
5 TABLET ORAL EVERY 4 HOURS PRN
Qty: 20 TABLET | Refills: 0 | Status: SHIPPED | OUTPATIENT
Start: 2022-12-18

## 2022-12-18 NOTE — TELEPHONE ENCOUNTER
Per mom's phone call: " My daughter had surgery on Friday  Her pain medication was sent over to the pharmacy, however the pharmacy is closed due to an emergency and we don't know when the pharmacy will reopen   My daughter is in a lot of pain "

## 2022-12-19 NOTE — UTILIZATION REVIEW
NOTIFICATION OF ADMISSION DISCHARGE   This is a Notification of Discharge from [address]  Please be advised that this patient has been discharge from our facility  Below you will find the admission and discharge date and time including the patient’s disposition  UTILIZATION REVIEW CONTACT:  Markie Deleon  Utilization   Network Utilization Review Department  Phone: [phone] x carefully listen to the prompts  All voicemails are confidential   Email: [email or web address]     ADMISSION INFORMATION  PRESENTATION DATE: 12/16/2022  7:27 AM  OBERVATION ADMISSION DATE:   INPATIENT ADMISSION DATE: 12/16/22  1:56 PM   DISCHARGE DATE: 12/17/2022  4:16 PM   DISPOSITION:Home/Self Care    IMPORTANT INFORMATION:  Send all requests for admission clinical reviews, approved or denied determinations and any other requests to dedicated fax number below belonging to the campus where the patient is receiving treatment   List of dedicated fax numbers:  [address] DENIALS (Administrative/Medical Necessity) [phone]   [address] (Maternity/NICU/Pediatrics) [phone]   [facility] [phone]   [place] [address] [phone]   [address] [phone]   220 [facility] [phone] 90 [facility] [phone]   [address] [place] [address] [phone]   [facility]  [phone]   4051 [facility] [phone]   412 [facility] 850 [address] [facility] [phone]

## 2022-12-20 ENCOUNTER — APPOINTMENT (OUTPATIENT)
Dept: PHYSICAL THERAPY | Facility: CLINIC | Age: 22
End: 2022-12-20

## 2022-12-21 ENCOUNTER — TRANSITIONAL CARE MANAGEMENT (OUTPATIENT)
Dept: FAMILY MEDICINE CLINIC | Facility: CLINIC | Age: 22
End: 2022-12-21

## 2022-12-21 DIAGNOSIS — R51.9 FREQUENT HEADACHES: ICD-10-CM

## 2022-12-21 DIAGNOSIS — Z90.89 S/P THYMECTOMY: Primary | ICD-10-CM

## 2022-12-21 DIAGNOSIS — G44.86 CERVICOGENIC HEADACHE: ICD-10-CM

## 2022-12-21 RX ORDER — BACLOFEN 5 MG/1
TABLET ORAL
Qty: 30 TABLET | Refills: 0 | Status: CANCELLED | OUTPATIENT
Start: 2022-12-21

## 2022-12-21 RX ORDER — TOPIRAMATE 25 MG/1
TABLET ORAL
Qty: 180 TABLET | Refills: 0 | Status: CANCELLED | OUTPATIENT
Start: 2022-12-21

## 2022-12-22 NOTE — TELEPHONE ENCOUNTER
topamax 25 mg last sent to [facility] on 9/23/22 as 90 day supply with 3 refills     [facility]

## 2022-12-22 NOTE — TELEPHONE ENCOUNTER
[facility]     Sent patient mychart message

## 2022-12-27 ENCOUNTER — HOSPITAL ENCOUNTER (OUTPATIENT)
Dept: RADIOLOGY | Facility: HOSPITAL | Age: 22
Discharge: HOME | End: 2022-12-27

## 2022-12-27 ENCOUNTER — APPOINTMENT (OUTPATIENT)
Dept: PHYSICAL THERAPY | Facility: CLINIC | Age: 22
End: 2022-12-27

## 2022-12-27 ENCOUNTER — TELEMEDICINE (OUTPATIENT)
Dept: FAMILY MEDICINE CLINIC | Facility: CLINIC | Age: 22
End: 2022-12-27

## 2022-12-27 DIAGNOSIS — Z09 HOSPITAL DISCHARGE FOLLOW-UP: Primary | ICD-10-CM

## 2022-12-27 DIAGNOSIS — Z90.89 S/P THYMECTOMY: ICD-10-CM

## 2022-12-28 ENCOUNTER — OFFICE VISIT (OUTPATIENT)
Dept: CARDIAC SURGERY | Facility: CLINIC | Age: 22
End: 2022-12-28

## 2022-12-28 VITALS
BODY MASS INDEX: 30.45 KG/M2 | TEMPERATURE: 98.2 F | DIASTOLIC BLOOD PRESSURE: 84 MMHG | HEART RATE: 101 BPM | RESPIRATION RATE: 17 BRPM | OXYGEN SATURATION: 100 % | WEIGHT: 194 LBS | SYSTOLIC BLOOD PRESSURE: 138 MMHG | HEIGHT: 67 IN

## 2022-12-28 DIAGNOSIS — M62.81 MUSCLE WEAKNESS: Primary | ICD-10-CM

## 2022-12-28 NOTE — ASSESSMENT & PLAN NOTE
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

## 2022-12-28 NOTE — PROGRESS NOTES
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, but we will await final pathology  Her pain is well controlled with ibuprofen, and her incisions are healing well  Her CXR looks great  She is still taking Mestinon three times daily, and she will work with her neurologist to hopefully wean off of this  She has no activity restrictions at this time  She will return in one month for a second post-operative visit  If the final pathology is consistent with thymoma, we we will schedule routine imaging surveillance at that time  Diagnoses and all orders for this visit:    Myasthenia gravis s/p thymectomy          Thoracic History       Diagnosis: myasthenia gravis, anterior mediastinal mass  Procedure: robotic thymectomy 12/16/22  Pathology: Preliminary: A  Soft Tissue, Other, Right Inferior Horn:  - Portion of benign fibroadipose tissue  B  Thymus, Complete Thymectomy:  - Thymic hyperplasia (62 grams)   - One benign lymph node (0/1)  Subjective:    Patient ID: Sherry Morales is a 25 y o  female  HPI   Ms Walt Manley is a 25year old female with myasthenia gravis who underwent a robotic thymectomy on 12/16/2022  Her post-operative course was uncomplicated and she was discharged on POD#1  CXR 12/27/22 demonstrates no pneumothorax or pleural effusion  Pathology is pending, but preliminary diagnosis demonstrates thymic hyperplasia and one benign lymph node  On discussion, her pain is controlled by ibuprofen  She hasn't taken Tylenol for the past few days, no oxycodone at all  Her pain is at about a 4 of 10 when it's at its worst, especially with movement  She sometimes feels the need to sigh, but is without shortness of breath       The following portions of the patient's history were reviewed and updated as appropriate: allergies, current medications, past family history, past medical history, past social history, past surgical history and problem list     Review of Systems   Constitutional: Negative  Respiratory: Negative  Cardiovascular: Positive for chest pain (controlled)  Gastrointestinal: Negative  Musculoskeletal: Negative  Skin: Negative  Objective:   Physical Exam  Constitutional:       General: She is not in acute distress  Appearance: Normal appearance  HENT:      Head: Normocephalic and atraumatic  Eyes:      General: No scleral icterus  Conjunctiva/sclera: Conjunctivae normal       Comments: glasses   Cardiovascular:      Rate and Rhythm: Normal rate and regular rhythm  Pulmonary:      Effort: Pulmonary effort is normal  No respiratory distress  Breath sounds: Normal breath sounds  Comments: Left robotic incisions healing well, one prolene suture removed without incident  Abdominal:      General: There is no distension  Palpations: Abdomen is soft  Musculoskeletal:      Cervical back: Neck supple  Lymphadenopathy:      Cervical: No cervical adenopathy  Skin:     General: Skin is warm and dry  Neurological:      General: No focal deficit present  Mental Status: She is alert and oriented to person, place, and time  Psychiatric:         Mood and Affect: Mood normal      /84 (BP Location: Left arm, Patient Position: Sitting, Cuff Size: Standard)   Pulse 101   Temp 98 2 °F (36 8 °C) (Temporal)   Resp 17   Ht 5' 7" (1 702 m)   Wt 88 kg (194 lb 0 1 oz)   SpO2 100%   BMI 30 39 kg/m²     XR chest pa & lateral    Result Date: 12/28/2022  Impression No acute cardiopulmonary disease  Workstation performed: [id number]     XR chest pa & lateral    Result Date: 12/17/2022  Impression No acute cardiopulmonary disease   Workstation performed: [id number]      CT chest w contrast    Result Date: 9/16/2022  Narrative CT CHEST WITH IV CONTRAST INDICATION:   G70 00: Myasthenia gravis without (acute) exacerbation  COMPARISON:  None  TECHNIQUE: CT examination of the chest was performed  Axial, sagittal, and coronal 2D reformatted images were created from the source data and submitted for interpretation  Radiation dose length product (DLP) for this visit:  222 87 mGy-cm   This examination, like all CT scans performed in the [facility], was performed utilizing techniques to minimize radiation dose exposure, including the use of iterative  reconstruction and automated exposure control  IV Contrast:  85 mL of iohexol (OMNIPAQUE) FINDINGS: LUNGS:  Mild dependent atelectatic changes in the left lower lobe  Lungs otherwise clear  Trachea and bronchi are patent  PLEURA:  Unremarkable  HEART/GREAT VESSELS: Heart is unremarkable for patient's age  No thoracic aortic aneurysm  MEDIASTINUM AND BRANNON:  Homogeneous triangular soft tissue density at the anterior mediastinum compatible with residual thymic tissue, slightly full for patient's age may represent thymic hyperplasia  Approximate measurements of the thymus of 3 8 x 2 6 x  4 7 cm  However no discrete rounded mass, cystic changes or calcifications to suggest underlying thymoma  CHEST WALL AND LOWER NECK: 1 1 cm hypodense nodule in the right lobe of the thyroid gland  Incidental discovery of one or more thyroid nodule(s) measuring more than 1 cm but without suspicious features is noted in this patient who is younger than 28years old; according to guidelines published in the February 2015 white paper on incidental thyroid nodules in the Journal of the [employer] of Radiology [facility]), further evaluation with thyroid ultrasound is recommended  Incidentally noted is a 1 9 cm cyst at the level of the suprasternal notch just left of midline  This is inferior to the left lobe of the thyroid    This could represent an incidental dermoid cyst or thyroglossal duct cyst  VISUALIZED STRUCTURES IN THE UPPER ABDOMEN:  Unremarkable  OSSEOUS STRUCTURES:  No acute fracture or destructive osseous lesion  Impression 1  Residual thymic tissue is noted to be somewhat full for patient's age and suggests thymic hyperplasia  Otherwise no findings suspicious for thymoma  2   Incidental thyroid nodule(s) for which nonemergent thyroid ultrasound is recommended  3   Incidentally noted is a 1 9 cm cyst at the level of the suprasternal notch just left of midline  This could represent an incidental dermoid cyst or thyroglossal duct cyst  The study was marked in EPIC for significant notification   Workstation performed: [id number]

## 2023-01-03 PROBLEM — Z09 HOSPITAL DISCHARGE FOLLOW-UP: Status: ACTIVE | Noted: 2023-01-03

## 2023-01-03 NOTE — PROGRESS NOTES
Virtual TCM Visit:    Verification of patient location:    Patient is located in the following state in which I hold an active license [place]    Assessment/Plan:        Problem List Items Addressed This Visit        Other    Hospital discharge follow-up - Primary     No medications were changed and appropriate follow ups are already scheduled  Reason for visit is TCM    Encounter provider Yuniel Marina MD     Provider located at [id number] [facility] [address]    Recent Visits  Date Type Provider Dept   12/27/22 49 MD Kwesi Abernathyclaire 65 recent visits within past 7 days and meeting all other requirements  Future Appointments  No visits were found meeting these conditions  Showing future appointments within next 150 days and meeting all other requirements       After connecting through creadso, the patient was identified by name and date of birth  Rebekah King was informed that this is a telemedicine visit and that the visit is being conducted through the [address] [employer] platform  She agrees to proceed     My office door was closed  No one else was in the room  She acknowledged consent and understanding of privacy and security of the video platform  The patient has agreed to participate and understands they can discontinue the visit at any time  Patient is aware this is a billable service  Transitional Care Management Review:  Rebekah King is a 25 y o  female here for TCM follow up       During the TCM phone call patient stated:    TCM Call     Date and time call was made  12/21/2022  3:41 PM    Hospital care reviewed  Records reviewed    Date of Admission  12/16/22    Date of discharge  12/17/22    Diagnosis  Myasthenia Gravis    Disposition  Home    Current Symptoms  None      TCM Call     Post hospital issues  None    Did you obtain your prescribed medications  Yes Do you need help managing your prescriptions or medications  No    Is transportation to your appointment needed  No    I have advised the patient to call PCP with any new or worsening symptoms  Sammie Jeffers MA    Living Arrangements  Family members    Are you recieving any outpatient services  No    Are you recieving home care services  No    Are you using any community resources  No    Current waiver services  No    Have you fallen in the last 12 months  No    Counseling  Patient    Counseling topics  Activities of daily living        Subjective: hospital discharge fu      Patient ID: Natty Thomson is a 25 y o  female  HPI   Maira Llamas was hospitalized from 12/16-12/17 for a thymectomy  She was recently diagnosed with myasthenia gravis with an anterior mediastinal mass  She had a robotic thymectomy without complications  No medications were changed and appropriate follow ups are already scheduled  Review of Systems   Constitutional: Negative for fever and unexpected weight change  HENT: Negative for ear pain, sore throat and trouble swallowing  Eyes: Negative for pain and visual disturbance  Respiratory: Negative for cough, chest tightness, shortness of breath and wheezing  Cardiovascular: Negative for chest pain  Gastrointestinal: Negative for abdominal distention, abdominal pain, blood in stool, constipation, diarrhea, nausea and vomiting  Endocrine: Negative for polydipsia and polyuria  Genitourinary: Negative for dysuria and hematuria  Musculoskeletal: Negative for back pain and myalgias  Skin: Negative for rash  Neurological: Negative for syncope and headaches  Psychiatric/Behavioral: Negative for suicidal ideas  Objective: There were no vitals filed for this visit  Physical Exam  Constitutional:       Appearance: She is well-developed  HENT:      Head: Normocephalic and atraumatic        Right Ear: External ear normal       Left Ear: External ear normal    Eyes: General: No scleral icterus  Conjunctiva/sclera: Conjunctivae normal    Pulmonary:      Effort: Pulmonary effort is normal  No respiratory distress  Musculoskeletal:      Cervical back: Normal range of motion  Neurological:      Mental Status: She is alert and oriented to person, place, and time  Medications have been reviewed by provider in current encounter    I spent 10 minutes with the patient during this visit  Arley Mason MD      VIRTUAL VISIT [address] verbally agrees to participate in [employer]  Pt is aware that [employer] could be limited without vital signs or the ability to perform a full hands-on physical exam  Sunita Carranza understands she or the provider may request at any time to terminate the video visit and request the patient to seek care or treatment in person

## 2023-01-20 ENCOUNTER — OFFICE VISIT (OUTPATIENT)
Dept: NEUROLOGY | Facility: CLINIC | Age: 23
End: 2023-01-20

## 2023-01-20 VITALS
HEIGHT: 67 IN | WEIGHT: 188 LBS | RESPIRATION RATE: 18 BRPM | TEMPERATURE: 97.2 F | HEART RATE: 103 BPM | SYSTOLIC BLOOD PRESSURE: 143 MMHG | DIASTOLIC BLOOD PRESSURE: 86 MMHG | BODY MASS INDEX: 29.51 KG/M2

## 2023-01-20 DIAGNOSIS — G43.009 MIGRAINE WITHOUT AURA AND WITHOUT STATUS MIGRAINOSUS, NOT INTRACTABLE: ICD-10-CM

## 2023-01-20 DIAGNOSIS — G70.00 MYASTHENIA GRAVIS (HCC): Primary | ICD-10-CM

## 2023-01-20 NOTE — ASSESSMENT & PLAN NOTE
Patient presented with fatigable weakness and intermittent diplopia since early 2022  Her workup revealed positive high titer AChR binding/blocking antibodies, MUSK negative  CT chest revealed residual thymus tissue concerning for thymoma vs thymic hyperplasia  She was initially placed on pyridostigmine 30mg TID and had significant improvement in her symptoms  She recently underwent robotic thymectomy on 12/16/22, following with cardiothoracic surgery  At this point, appears most consistent with thymic hyperplasia  Her MG ADL score has decreased to 4/24 from 8/24 back in Sept after just starting the pyridostigmine  She still experiences "bad days" where her symptoms are more prominent, although not nearly as bad as before starting treatment  We again reviewed symptoms of myasthenic crisis and when she would need to report to the ED  We reviewed common medications known to potentially worsen MG  I suggested she print off a list of these medications and carry it with her, and give a list to her PCP as well  Would benefit from medical alert bracelet with her diagnosis  We discussed thymectomy may improve her symptoms and lessen the need for medication, but it can be several months to even years to see this  Her exam is excellent today, just took her pyridostigmine about 30 min prior to her office visit  Plan:  -continue pyridostigmine 30mg three times a day  I did let her know that if she feels she is having a "bad day" and symptoms are more prominent, she can take 60mg at any of her scheduled doses (she has 60mg tabs, currently cuts in 1/2)    She is taking the medication at 8am, 1pm, and 6pm  -continue to follow with cardiothoracic surgery post-thymectomy to discuss final pathology report (appt next week)  -ED precautions given if any myasthenic crisis symptoms should occur

## 2023-01-20 NOTE — PROGRESS NOTES
Patient ID: Isac Goodrich is a 25 y o  female  Assessment/Plan:    Myasthenia gravis [facility])  Patient presented with fatigable weakness and intermittent diplopia since early 2022  Her workup revealed positive high titer AChR binding/blocking antibodies, MUSK negative  CT chest revealed residual thymus tissue concerning for thymoma vs thymic hyperplasia  She was initially placed on pyridostigmine 30mg TID and had significant improvement in her symptoms  She recently underwent robotic thymectomy on 12/16/22, following with cardiothoracic surgery  At this point, appears most consistent with thymic hyperplasia  Her MG ADL score has decreased to 4/24 from 8/24 back in Sept after just starting the pyridostigmine  She still experiences "bad days" where her symptoms are more prominent, although not nearly as bad as before starting treatment  We again reviewed symptoms of myasthenic crisis and when she would need to report to the ED  We reviewed common medications known to potentially worsen MG  I suggested she print off a list of these medications and carry it with her, and give a list to her PCP as well  Would benefit from medical alert bracelet with her diagnosis  We discussed thymectomy may improve her symptoms and lessen the need for medication, but it can be several months to even years to see this  Plan:  -continue pyridostigmine 30mg three times a day  I did let her know that if she feels she is having a "bad day" and symptoms are more prominent, she can take 60mg at any of her scheduled doses (she has 60mg tabs, currently cuts in 1/2)    She is taking the medication at 8am, 1pm, and 6pm  -continue to follow with cardiothoracic surgery post-thymectomy to discuss final pathology report (appt next week)  -ED precautions given if any myasthenic crisis symptoms should occur    Migraine without aura and without status migrainosus, not intractable  Currently well controlled with Topamax 50mg HS for prevention, and rizatriptan 10mg for abortive therapy  Advised to avoid magnesium supplements for migraine relief, as this can potentially worsen MG  Advised she continue to stay well hydrated, eat regularly, get adequate sleep  Patient will follow up in 2 months as already scheduled with Dr Bigg Chau, or sooner if needed  Diagnoses and all orders for this visit:    Myasthenia gravis ([address] )    Migraine without aura and without status migrainosus, not intractable           Subjective:    JESSICA Comer is a 25 y o  female who presents today for neurologic follow up  To review, patient initially presented to the neurology clinic in April 2020 for evaluation of headaches dating back to age 15  There is a family history of migraines in her mother  She had been using Maxalt for abortive therapy Rx'd by PCP, which had been working well for her  She was tried on Topamax for migraine prevention  She had been doing well at 50mg HS, but went she tried to increase to 75mg HS, she had side effects, therefore Topamax reduced back to 50mg HS  As part of her workup, MRI brain was ordered  MRI brain wo contrast 6/2/2020  Brain normal, although there was borderline enlargement of the pituitary gland with a convex superior margin  This may represent pituitary hyperplasia, particularly if the patient is on oral contraceptives (she was)  MRI brain pituitary w and wo contrast completed 6/10/2020  This demonstrated mild diffuse enlargement of the pituitary gland with homogeneous enhancement  Differential considerations include pituitary hyperplasia versus macroadenoma  She was referred to endocrinology, no hormonal abnormalities  This was favored to be physiologic enlargement of the pituitary  More recently, patient contacted our office via [id number] message in May 2022   In the message, she reported developing some concerning symptoms including GI distress (pain which was relieved with vomiting), headaches that were different than her migraines, shaking in the hands, back pain and neck pain, muscle weakness  Patient reported she went to PT ordered by her PCP  The patient had an updated MRI brain/pituitary w and wo contrast on 3/4/22 which was stable--normal brain imaging and stable enlargement of the pituitary, 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  While waiting for an appt, Dr Dina Weir ordered some updated studies  MRA head completed 6/28/22 was unremarkable  MRI c-spine 6/28/22 with normal cord signal  Small central disc protrusion at level C5-C6 resulting in mild canal narrowing  No foraminal stenosis  Patient was seen in August 2022 along with her mother to discuss ongoing symptoms  Both Antonio Aceves and her mother were very concerned regarding the newer symptoms  Antonio Aceves reported symptoms started at the beginning of 2022  While attending school at [employer] at the beginning of the year, she started having difficulty ambulating, especially on the hilly campus  She reported having numerous falls, occurring a few times a week  She had trouble going up her stairs and actually had to move out of her 3rd floor apartment because it was too difficulty to climb the stairs  She reported feeling like her legs were getting weak and giving out  Right side seemed worse than left  She reporting taking the train into [facility] for work a few times a week and frequently falling while getting onto the train  She is able to get herself back up after a fall  She denied any dizziness or vertigo  She was also noticing intermittent double vision, usually with mid-distance such as watching tv  She feels like she has to look down out of the bottom of her vision in order for this to improve  She reported some shaking of her hands, mostly her right, when she is doing something like holding a glass, handing something to someone  Her mother had noticed this as well  Her hands would cramp when she was writing or typing for over 10 min at a time  She denied weakness in the hands or arms, dropping objects  When asked, she noted some urinary incontinence a few times  She denied any muscle twitching or fasciculations  She denied any significant SOB, dysphagia  She had never presented to the ED for any of these symptoms  Mom was questioning if this could be MS  There is no known family history of MS or other neuromuscular disease  She denied any specific inciting event prior to symptom onset  She did report she went through a stressful situation in April, but symptoms had already started prior to that  Her exam at the visit was essentially unremarkable except for slight right hip flexion weakness  Additional workup was ordered at time of her visit in August 2022:  --Labs 8/17/22:  Acetylcholine receptor binding >80 00, acetylcholine receptor blocking 49%, MUSK ab negative  B12 516, vit D 15 5, negative EDUARDO, RF, Sjogren's  Sed rate nl 13, copper slightly low 77, normal magnesium 2 2   --EMG RUE/RLE 9/15/22 was normal  --CT chest w contrast 9/13/22 residual thymic tissue noted to be somewhat full for patient's age and suggest thymic hyperplasia  No findings suspicious for thymoma  Incidental thyroid nodule, incidental 1 9cm cyst at the level of the suprasternal notch just left of midline  This could represent an incidental dermoid cyst or thyroglossal duct cyst   --MRI t-spine 9/14/22 normal cord signal  Incidentally seen 1 3cm right thyroid nodule  Redemonstrated left infra thyroidal soft tissue lesion at the level of the suprasternal notch may represent benign lymph node    Once patient's labs were resulted and acetylcholine receptor abs were positive, she was called and started on Mestinon 30mg TID  She noticed significant improvement in symptoms  She had minor GI upset in the first 2 days of taking the medication, but this improved  She was seen by Dr Mendel Seed on 9/23/22  Mestinon 30mg TID continued    She was referred to cardiothoracic surgery due to residual thymus tissue  Patient had initial consult with cardiothoracic surgery (Dr Travis Diamond) on 10/26/22  Robotic assisted left thorascopic thymectomy was advised  This was performed on 12/16/2022  Surgery went well without complication  Neurology was consulted in the hospital to comment on her post-surgical MG meds, and it was advised she continue Mestinon 30mg TID  Today, patient reports she is doing well  She is feeling fine 5 weeks post-surgery, is following with cardiothoracic surgery next week  It is felt this was thymic hyperplasia  She is taking Mestinon 30mg at 8am, 1pm and 6pm (roughly)  She has noticed significant improvement in her MG symptoms since starting this medication  Some days she does notice the symptoms such as fatigable weakness, difficulty chewing solid foods (when chewing for a while), and very intermittent double vision  She now has prisms in her glasses  Symptoms tend to be worse first thing in the AM, especially if she does not get out of bed right away, or later in the evening  Her MG ADL score has gone from 8/24 in Sept 2022, to 4/24 today  She has no SOB, dysphagia  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)  The following portions of the patient's history were reviewed and updated as appropriate: current medications, past family history, past medical history, past social history, past surgical history and problem list        Objective:    Blood pressure 143/86, pulse 103, temperature (!) 97 2 °F (36 2 °C), temperature source Tympanic, resp  rate 18, height 5' 7" (1 702 m), weight 85 3 kg (188 lb)  Physical Exam  Constitutional:       Appearance: Normal appearance  HENT:      Head: Normocephalic and atraumatic  Eyes:      Extraocular Movements: EOM normal       Pupils: Pupils are equal, round, and reactive to light  Neurological:      Mental Status: She is alert        Deep Tendon Reflexes: Reflexes are normal and symmetric  Psychiatric:         Speech: Speech normal          Neurological Exam  Mental Status  Alert  Oriented to person, place, time and situation  Speech is normal  Language is fluent with no aphasia  Attention and concentration are normal     Cranial Nerves  CN II: Visual fields full to confrontation  CN III, IV, VI: Extraocular movements intact bilaterally  No ptosis   Pupils equal round and reactive to light bilaterally  CN V: Facial sensation is normal   CN VII: Full and symmetric facial movement  CN VIII: Hearing is normal   CN IX, X: Palate elevates symmetrically  CN XI: Shoulder shrug strength is normal   CN XII: Tongue midline without atrophy or fasciculations  Motor   Normal muscle tone  Strength is 5/5 in all four extremities except as noted  Very slight hip flexor weakness bilaterally 5-/5  Sensory  Light touch is normal in upper and lower extremities  Reflexes  Deep tendon reflexes are 2+ and symmetric in all four extremities  Coordination  Right: Finger-to-nose normal Left: Finger-to-nose normal     Gait  Casual gait is normal including stance, stride, and arm swing  ROS:    Review of Systems   Constitutional: Negative for chills and fever  HENT: Negative for ear pain and sore throat  Eyes: Negative for pain and visual disturbance  Respiratory: Negative for cough and shortness of breath  Cardiovascular: Negative for chest pain and palpitations  Gastrointestinal: Negative for abdominal pain and vomiting  Genitourinary: Negative for dysuria and hematuria  Musculoskeletal: Negative for arthralgias and back pain  Skin: Negative for color change and rash  Neurological: Negative for seizures and syncope  All other systems reviewed and are negative      I personally reviewed and updated the ROS as appropriate

## 2023-01-20 NOTE — ASSESSMENT & PLAN NOTE
Currently well controlled with Topamax 50mg HS for prevention, and rizatriptan 10mg for abortive therapy  Advised to avoid magnesium supplements for migraine relief, as this can potentially worsen MG  Advised she continue to stay well hydrated, eat regularly, get adequate sleep

## 2023-01-20 NOTE — PATIENT INSTRUCTIONS
Continue mestinon 1/2 tablet three times a day  If you feel like its a "bad day", can take a full tablet  If you find you need to do this often, let us know and we can change the prescription to reflect that  Continue current medications for migraine    Make sure you are getting good sleep, eating regularly, drinking water, avoiding triggers   Follow up in 2 months with Dr Onel Mari as scheduled, or sooner if needed

## 2023-01-27 ENCOUNTER — OFFICE VISIT (OUTPATIENT)
Dept: CARDIAC SURGERY | Facility: CLINIC | Age: 23
End: 2023-01-27

## 2023-01-27 VITALS
OXYGEN SATURATION: 100 % | HEART RATE: 140 BPM | DIASTOLIC BLOOD PRESSURE: 80 MMHG | TEMPERATURE: 98 F | BODY MASS INDEX: 29.9 KG/M2 | WEIGHT: 190.48 LBS | SYSTOLIC BLOOD PRESSURE: 132 MMHG | HEIGHT: 67 IN

## 2023-01-27 DIAGNOSIS — M62.81 MUSCLE WEAKNESS: Primary | ICD-10-CM

## 2023-01-27 NOTE — PROGRESS NOTES
Assessment/Plan:    Myasthenia gravis s/p thymectomy  Rom Castano presents for a second post-operative visit following left approach robotic thymectomy  Final pathology is consistent with thymic hyperplasia which does not require any imaging surveillance  She will continue to work with neurology for her myasthenia  She will call the office in the future with any questions or concerns  Follow up as needed  Diagnoses and all orders for this visit:    Myasthenia gravis s/p thymectomy          Thoracic History     Diagnosis: myasthenia gravis, anterior mediastinal mass  Procedure: robotic thymectomy 12/16/22  Pathology: Preliminary: A  Soft Tissue, Other, Right Inferior Horn:  - Portion of benign fibroadipose tissue  B  Thymus, Complete Thymectomy:  - Thymic hyperplasia (62 grams)   - One benign lymph node (0/1)  Subjective:    Patient ID: Deric Fraser is a 25 y o  female  HPI   Ms Nesha Gallegos is a 25year old female with myasthenia gravis who underwent a robotic thymectomy on 12/16/2022  Her post-operative course was uncomplicated and she was discharged on POD#1  Final pathology was consistent with thymic hyperplasia, second opinion was obtained at [facility]  On discussion, she is doing very well  Her incisions are healed and she denies trouble breathing  Sometimes she can "hear myself breathing" when she is laying down, but this isn't bothersome  She is not taking any pain medication  She does have some positional soreness  Her myasthenia symptoms of muscle weakness and facial droop have been well controlled on Mestinon  The following portions of the patient's history were reviewed and updated as appropriate: allergies, current medications, past family history, past medical history, past social history, past surgical history and problem list     Review of Systems   Cardiovascular: Positive for chest pain (minimal)  Neurological: Positive for weakness (improved)     All other systems reviewed and are negative  Objective:   Physical Exam  Constitutional:       General: She is not in acute distress  Appearance: Normal appearance  HENT:      Head: Normocephalic and atraumatic  Eyes:      Conjunctiva/sclera: Conjunctivae normal    Cardiovascular:      Rate and Rhythm: Normal rate and regular rhythm  Pulmonary:      Effort: Pulmonary effort is normal  No respiratory distress  Breath sounds: Normal breath sounds  Comments: Left robotic incisions well healed  Abdominal:      General: There is no distension  Palpations: Abdomen is soft  Musculoskeletal:      Cervical back: Neck supple  Right lower leg: No edema  Left lower leg: No edema  Lymphadenopathy:      Cervical: No cervical adenopathy  Skin:     General: Skin is warm and dry  Neurological:      General: No focal deficit present  Mental Status: She is alert and oriented to person, place, and time  Psychiatric:         Mood and Affect: Mood normal      /80 (BP Location: Left arm, Patient Position: Sitting, Cuff Size: Large)   Pulse (!) 140   Temp 98 °F (36 7 °C) (Temporal)   Ht 5' 7" (1 702 m)   Wt 86 4 kg (190 lb 7 6 oz)   SpO2 100%   BMI 29 83 kg/m²     XR chest pa & lateral    Result Date: 12/28/2022  Impression No acute cardiopulmonary disease  Workstation performed: [id number]     XR chest pa & lateral    Result Date: 12/17/2022  Impression No acute cardiopulmonary disease  Workstation performed: [id number]      CT chest w contrast    Result Date: 9/16/2022  Narrative CT CHEST WITH IV CONTRAST INDICATION:   G70 00: Myasthenia gravis without (acute) exacerbation  COMPARISON:  None  TECHNIQUE: CT examination of the chest was performed  Axial, sagittal, and coronal 2D reformatted images were created from the source data and submitted for interpretation  Radiation dose length product (DLP) for this visit:  222 87 mGy-cm     This examination, like all CT scans performed in the [facility], was performed utilizing techniques to minimize radiation dose exposure, including the use of iterative  reconstruction and automated exposure control  IV Contrast:  85 mL of iohexol (OMNIPAQUE) FINDINGS: LUNGS:  Mild dependent atelectatic changes in the left lower lobe  Lungs otherwise clear  Trachea and bronchi are patent  PLEURA:  Unremarkable  HEART/GREAT VESSELS: Heart is unremarkable for patient's age  No thoracic aortic aneurysm  MEDIASTINUM AND BRANNON:  Homogeneous triangular soft tissue density at the anterior mediastinum compatible with residual thymic tissue, slightly full for patient's age may represent thymic hyperplasia  Approximate measurements of the thymus of 3 8 x 2 6 x  4 7 cm  However no discrete rounded mass, cystic changes or calcifications to suggest underlying thymoma  CHEST WALL AND LOWER NECK: 1 1 cm hypodense nodule in the right lobe of the thyroid gland  Incidental discovery of one or more thyroid nodule(s) measuring more than 1 cm but without suspicious features is noted in this patient who is younger than 28years old; according to guidelines published in the February 2015 white paper on incidental thyroid nodules in the Journal of the [employer] of Radiology [facility]), further evaluation with thyroid ultrasound is recommended  Incidentally noted is a 1 9 cm cyst at the level of the suprasternal notch just left of midline  This is inferior to the left lobe of the thyroid  This could represent an incidental dermoid cyst or thyroglossal duct cyst  VISUALIZED STRUCTURES IN THE UPPER ABDOMEN:  Unremarkable  OSSEOUS STRUCTURES:  No acute fracture or destructive osseous lesion  Impression 1  Residual thymic tissue is noted to be somewhat full for patient's age and suggests thymic hyperplasia  Otherwise no findings suspicious for thymoma  2   Incidental thyroid nodule(s) for which nonemergent thyroid ultrasound is recommended   3   Incidentally noted is a 1 9 cm cyst at the level of the suprasternal notch just left of midline  This could represent an incidental dermoid cyst or thyroglossal duct cyst  The study was marked in EPIC for significant notification  Workstation performed: [id number]     No CT Chest,Abdomen,Pelvis results available for this patient  No NM PET CT results available for this patient  No Barium Swallow results available for this patient

## 2023-01-27 NOTE — ASSESSMENT & PLAN NOTE
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

## 2023-01-30 ENCOUNTER — TELEPHONE (OUTPATIENT)
Dept: BEHAVIORAL/MENTAL HEALTH CLINIC | Facility: CLINIC | Age: 23
End: 2023-01-30

## 2023-01-30 NOTE — TELEPHONE ENCOUNTER
NO-SHOW LETTER (INTEREST LETTER) MAILED TO Catarina Culp    ADDRESS: [address] [facility] [address]     Mailed on 2/1/2023

## 2023-02-13 ENCOUNTER — TELEPHONE (OUTPATIENT)
Dept: CARDIAC SURGERY | Facility: CLINIC | Age: 23
End: 2023-02-13

## 2023-02-22 ENCOUNTER — TELEPHONE (OUTPATIENT)
Dept: PSYCHIATRY | Facility: CLINIC | Age: 23
End: 2023-02-22

## 2023-02-22 NOTE — TELEPHONE ENCOUNTER
DISCHARGE LETTER for Darlene Jay LCSW (certified and regular) placed in outgoing mail on 02/22/23      Article #:  [id number]     Address:  [address] [address]  [address] [facility] [address]

## 2023-03-16 ENCOUNTER — TELEPHONE (OUTPATIENT)
Dept: NEUROLOGY | Facility: CLINIC | Age: 23
End: 2023-03-16

## 2023-03-16 NOTE — TELEPHONE ENCOUNTER
Called and spoke to patient   Patient confirmed upcoming apt with Dr Brandie Armendariz on 3/29/23 @ 1 pm

## 2023-03-29 ENCOUNTER — OFFICE VISIT (OUTPATIENT)
Dept: NEUROLOGY | Facility: CLINIC | Age: 23
End: 2023-03-29

## 2023-03-29 VITALS
OXYGEN SATURATION: 99 % | RESPIRATION RATE: 18 BRPM | HEIGHT: 67 IN | BODY MASS INDEX: 30.61 KG/M2 | TEMPERATURE: 98.2 F | WEIGHT: 195 LBS | SYSTOLIC BLOOD PRESSURE: 120 MMHG | DIASTOLIC BLOOD PRESSURE: 74 MMHG | HEART RATE: 102 BPM

## 2023-03-29 DIAGNOSIS — G44.86 CERVICOGENIC HEADACHE: ICD-10-CM

## 2023-03-29 DIAGNOSIS — G70.00 MYASTHENIA GRAVIS (HCC): ICD-10-CM

## 2023-03-29 DIAGNOSIS — G43.009 MIGRAINE WITHOUT AURA AND WITHOUT STATUS MIGRAINOSUS, NOT INTRACTABLE: ICD-10-CM

## 2023-03-29 RX ORDER — RIZATRIPTAN BENZOATE 10 MG/1
10 TABLET, ORALLY DISINTEGRATING ORAL AS NEEDED
Qty: 18 TABLET | Refills: 1 | Status: SHIPPED | OUTPATIENT
Start: 2023-03-29

## 2023-03-29 RX ORDER — PYRIDOSTIGMINE BROMIDE 60 MG/1
30 TABLET ORAL 4 TIMES DAILY
Qty: 180 TABLET | Refills: 3 | Status: SHIPPED | OUTPATIENT
Start: 2023-03-29

## 2023-03-29 RX ORDER — BACLOFEN 5 MG/1
TABLET ORAL
Qty: 90 TABLET | Refills: 1 | Status: SHIPPED | OUTPATIENT
Start: 2023-03-29

## 2023-03-29 NOTE — ASSESSMENT & PLAN NOTE
Sha Kevin is a 70-year-old patient with migraine headaches  Her migraines are relatively well controlled  She does utilize Topamax 50 mg at bedtime and Maxalt as needed  I have refilled her prescriptions

## 2023-03-29 NOTE — ASSESSMENT & PLAN NOTE
Nafisa Cuellar is a 51-year-old young woman with antibody positive myasthenia gravis  She recently had a robotic assisted thymectomy in December 2022  She is currently on Mestinon 30 mg tablets up to 3 times a day  We discussed that the majority of her symptoms occur in the morning upon awakening prior to her a m  dose and at night when she goes to bed  We discussed various times of the Mestinon I have encouraged her to take it in the morning, mid afternoon and in the evening  She can also take it up to 30 mg 4 times a day  We discussed alternative medications including prednisone and immunosuppressive agents which we would like to avoid at this time  Today's MG ADL score was 1  She does have a list of antibiotics or other medicines to avoid with myasthenia gravis    She is to keep us in close telephone contact if there is any other new questions or problems in the interim

## 2023-03-29 NOTE — PROGRESS NOTES
Patient ID: Ludy Galarza is a 25 y o  female  Assessment/Plan:    Myasthenia gravis ([facility] )  Edward Bean is a 41-year-old young woman with antibody positive myasthenia gravis  She recently had a robotic assisted thymectomy in December 2022  She is currently on Mestinon 30 mg tablets up to 3 times a day  We discussed that the majority of her symptoms occur in the morning upon awakening prior to her a m  dose and at night when she goes to bed  We discussed various times of the Mestinon I have encouraged her to take it in the morning, mid afternoon and in the evening  She can also take it up to 30 mg 4 times a day  We discussed alternative medications including prednisone and immunosuppressive agents which we would like to avoid at this time  Today's MG ADL score was 1  She does have a list of antibiotics or other medicines to avoid with myasthenia gravis  She is to keep us in close telephone contact if there is any other new questions or problems in the interim    Migraine without aura and without status migrainosus, not intractable  Edward Bean is a 41-year-old patient with migraine headaches  Her migraines are relatively well controlled  She does utilize Topamax 50 mg at bedtime and Maxalt as needed  I have refilled her prescriptions  Diagnoses and all orders for this visit:    Migraine without aura and without status migrainosus, not intractable  -     rizatriptan (Maxalt-MLT) 10 mg disintegrating tablet; Take 1 tablet (10 mg total) by mouth as needed for migraine Take at the onset of migraine; if symptoms continue or return, may take another dose at least 2 hours after first dose  Take no more than 2 doses in a day  Myasthenia gravis ([facility] )  -     pyridostigmine (MESTINON) 60 mg tablet;  Take 0 5 tablets (30 mg total) by mouth 4 (four) times a day    Cervicogenic headache  -     Baclofen 5 MG TABS; Take 1 po HS         Subjective:    Ludy Galarza is a 25 y o  female who presents today for neurologic follow up  Today she presents in no neurological follow-up  She recently underwent a robotic assisted thymectomy in December 2022 after 3 to 4 weeks she did complete the recover and she reports overall her myasthenic symptoms are well controlled  She does utilize Mestinon 30 mg up to 3 times a day  No the more severe symptoms are in the morning upon awakening where she has difficulty going up and down steps and at night when she goes to bed  She utilizes the Mestinon 1 at 5 AM with breakfast 1 around noon and 1 with dinner  She does have a history of migraine headaches which she utilizes Topamax 50 mg a day for prevention  She did previously have 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  In May 2022 she developed increasing symptoms of shaking, muscle weakness  She did undergo repeat left imaging studies but in August 2022 she had increasing symptoms of falling, difficulty ambulating double vision cramping  She was eventually diagnosed with myasthenia gravis with positive antibodies and has been placed on Mestinon         Additional workup was ordered at time of her visit in August 2022:  --Labs 8/17/22:  Acetylcholine receptor binding >80 00, acetylcholine receptor blocking 49%, MUSK ab negative  B12 516, vit D 15 5, negative EDUARDO, RF, Sjogren's  Sed rate nl 13, copper slightly low 77, normal magnesium 2 2   --EMG RUE/RLE 9/15/22 was normal  --CT chest w contrast 9/13/22 residual thymic tissue noted to be somewhat full for patient's age and suggest thymic hyperplasia  No findings suspicious for thymoma  Incidental thyroid nodule, incidental 1 9cm cyst at the level of the suprasternal notch just left of midline   This could represent an incidental dermoid cyst or thyroglossal duct cyst   --MRI t-spine 9/14/22 normal cord signal  Incidentally seen 1 3cm right thyroid nodule    Redemonstrated left infra thyroidal soft tissue lesion at the level of the suprasternal notch may represent benign lymph node          MG dx in august 2022  MG adl score 03/29/23 1 / 24   meds : mestinon  30 mg up to qid   Dec of 2022, 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        The following portions of the patient's history were reviewed and updated as appropriate:   She  has a past medical history of COVID (08/2022), Depression, History of migraine, Low vitamin D level, and Uses birth control  She  has a past surgical history that includes Tonsillectomy and adenoidectomy; ADENOIDECTOMY; pr thymectomy prtl/tot transcervical appr spx (Left, 12/16/2022); pr br[facility] incl fluor gdnce dx w/cell washg spx (N/A, 12/16/2022); and Total thymectomy (Left, 12/16/2022)  Her family history includes Breast cancer in her paternal grandmother; Diabetes in her father; Hypertension in her father and maternal grandmother; No Known Problems in her mother  She  reports that she has never smoked  She has never used smokeless tobacco  She reports current alcohol use of about 10 0 standard drinks per week  She reports that she does not use drugs    Current Outpatient Medications   Medication Sig Dispense Refill   • Baclofen 5 MG TABS Take 1 po HS 90 tablet 1   • cholecalciferol (VITAMIN D3) 1,000 units tablet Take 1,000 Units by mouth daily     • ergocalciferol (VITAMIN D2) 50,000 units Take 1 capsule (50,000 Units total) by mouth once a week 12 capsule 0   • ferrous sulfate 325 (65 Fe) mg tablet Take 325 mg by mouth daily at bedtime     • Fiber Complete TABS Take 2 tablets by mouth daily     • Levonorgestrel (MIRENA) 20 MCG/DAY IUD 1 each by Intrauterine route once     • ondansetron (Zofran ODT) 4 mg disintegrating tablet Take 1 tablet (4 mg total) by mouth every 6 (six) hours as needed for nausea or vomiting 20 tablet 0   • pyridostigmine (MESTINON) 60 mg tablet Take 0 5 tablets (30 mg total) by mouth 4 (four) times a day 180 tablet 3   • rizatriptan (Maxalt-MLT) 10 mg disintegrating tablet Take 1 tablet (10 mg "total) by mouth as needed for migraine Take at the onset of migraine; if symptoms continue or return, may take another dose at least 2 hours after first dose  Take no more than 2 doses in a day  18 tablet 1   • topiramate (TOPAMAX) 25 mg tablet 2 tab po qhs 180 tablet 3   • oxyCODONE (Roxicodone) 5 immediate release tablet Take 1 tablet (5 mg total) by mouth every 4 (four) hours as needed for moderate pain Max Daily Amount: 30 mg (Patient not taking: Reported on 1/27/2023) 20 tablet 0     No current facility-administered medications for this visit  She is allergic to augmentin [amoxicillin-pot clavulanate] and fish-derived products - food allergy            Objective:    Blood pressure 120/74, pulse 102, temperature 98 2 °F (36 8 °C), temperature source Tympanic, resp  rate 18, height 5' 7\" (1 702 m), weight 88 5 kg (195 lb), SpO2 99 %  Physical Exam  Eyes:      General: Lids are normal       Extraocular Movements: Extraocular movements intact  Pupils: Pupils are equal, round, and reactive to light  Neurological:      Motor: Motor strength is normal       Deep Tendon Reflexes:      Reflex Scores:       Tricep reflexes are 1+ on the right side and 1+ on the left side  Bicep reflexes are 2+ on the right side and 2+ on the left side  Patellar reflexes are 2+ on the right side and 2+ on the left side  Achilles reflexes are 1+ on the right side and 1+ on the left side  Neurological Exam  Mental Status  Awake, alert and oriented to person, place and time  Oriented to person, place and time  Language is fluent with no aphasia  Cranial Nerves  CN II: Visual acuity is normal  Visual fields full to confrontation  CN III, IV, VI: Extraocular movements intact bilaterally  Normal lids and orbits bilaterally  Pupils equal round and reactive to light bilaterally  CN V: Facial sensation is normal   CN VII: Full and symmetric facial movement    CN VIII: Hearing is normal   CN IX, X: Palate " elevates symmetrically  Normal gag reflex  CN XI: Shoulder shrug strength is normal   CN XII: Tongue midline without atrophy or fasciculations  Motor  Normal muscle bulk throughout  No fasciculations present  Strength is 5/5 throughout all four extremities  No fatigable weakness  She had no evidence of ptosis with superior gaze       Sensory  Light touch is normal in upper and lower extremities  Temperature is normal in upper and lower extremities  Reflexes                                            Right                      Left  Biceps                                 2+                         2+  Triceps                                1+                         1+  Patellar                                2+                         2+  Achilles                                1+                         1+  Plantar                           Downgoing                Downgoing    Coordination  Right: Finger-to-nose normal Left: Finger-to-nose normal     Gait  Normal casual, toe, heel and tandem gait  Able to rise from chair without using arms  ROS:    Review of Systems   Constitutional: Negative  Negative for appetite change and fever  HENT: Negative  Negative for hearing loss, tinnitus, trouble swallowing and voice change  Eyes: Negative  Negative for photophobia, pain and visual disturbance  Respiratory: Negative  Negative for shortness of breath  Cardiovascular: Negative  Negative for palpitations  Gastrointestinal: Negative  Negative for nausea and vomiting  Endocrine: Negative  Negative for cold intolerance  Genitourinary: Negative  Negative for dysuria, frequency and urgency  Musculoskeletal: Negative  Negative for gait problem, myalgias and neck pain (mild neck pain)  Skin: Negative  Negative for rash  Allergic/Immunologic: Negative  Neurological: Negative    Negative for dizziness, tremors, seizures, syncope, facial asymmetry, speech difficulty, weakness, light-headedness, numbness and headaches  Hematological: Negative  Does not bruise/bleed easily  Psychiatric/Behavioral: Negative  Negative for confusion, hallucinations and sleep disturbance         Can return to our offices in July 2023

## 2023-04-04 ENCOUNTER — TELEPHONE (OUTPATIENT)
Dept: FAMILY MEDICINE CLINIC | Facility: CLINIC | Age: 23
End: 2023-04-04

## 2023-04-04 NOTE — TELEPHONE ENCOUNTER
Voicemail left by patient:     Hi, my name is Leticia Del Cid bee  I had an appointment  My YOB: 2000 and my phone number is [phone]  I had an appointment on I believe December 27th of 2022 that I'm getting charged in full for  I talked to my insurance and they said I should not be and that you need to refile that claim because something went wrong with it  So I was wondering if you would please refile that claim for me so that I do not have to pay the $200 00 that I do not owe  My Member I have [employer] insurance through [address]  through [address]  and my Member ID is ALBERT [id number] again that is ALBERT [id number]  Again my name is Adri Shaw  I was born on April 26, 2000 and my appointment that I need you to recover file the claim for was on I believe December 27th of 2022  You can call me back at [phone]  Thank you so much  Prince estrada  Called to provide patient with the phone number for the billing dept, patient was driving and said she would call back

## 2023-04-20 PROBLEM — Z09 HOSPITAL DISCHARGE FOLLOW-UP: Status: RESOLVED | Noted: 2023-01-03 | Resolved: 2023-04-20

## 2023-04-20 PROBLEM — E55.9 VITAMIN D DEFICIENCY: Status: ACTIVE | Noted: 2023-04-20

## 2023-04-20 PROBLEM — R51.9 FREQUENT HEADACHES: Status: RESOLVED | Noted: 2020-03-16 | Resolved: 2023-04-20

## 2023-04-20 PROBLEM — Z00.00 ANNUAL PHYSICAL EXAM: Status: RESOLVED | Noted: 2022-03-08 | Resolved: 2023-04-20

## 2023-04-24 ENCOUNTER — TELEPHONE (OUTPATIENT)
Dept: FAMILY MEDICINE CLINIC | Facility: CLINIC | Age: 23
End: 2023-04-24

## 2023-04-24 ENCOUNTER — APPOINTMENT (OUTPATIENT)
Dept: LAB | Facility: HOSPITAL | Age: 23
End: 2023-04-24
Attending: INTERNAL MEDICINE

## 2023-04-24 DIAGNOSIS — Z11.1 SCREENING FOR TUBERCULOSIS: ICD-10-CM

## 2023-04-24 NOTE — TELEPHONE ENCOUNTER
Patient called and left this message:     i, my name is Brian Harvey  I'm calling back about an appointment on December 27th, 2022 that was on telemedicine that should not have been, that should have been sent through to my insurance and it either was not or it was and was not filed correctly  I am requesting that be filed again to my insurance because I am being asked to pay for that and I should not be  So please let me know if that gets done or if there is anything I need to do for that  I am available for the next I want to say about 15 minutes and then I am available from about 3:00 o'clock until 4:30 and then I should be available after 5:00 o'clock this evening  Thank you so much  Please call me back during any of those times  Unfortunately, 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

## 2023-04-26 LAB
GAMMA INTERFERON BACKGROUND BLD IA-ACNC: 0.02 IU/ML
M TB IFN-G BLD-IMP: NEGATIVE
M TB IFN-G CD4+ BCKGRND COR BLD-ACNC: 0 IU/ML
M TB IFN-G CD4+ BCKGRND COR BLD-ACNC: 0 IU/ML
MITOGEN IGNF BCKGRD COR BLD-ACNC: >10 IU/ML

## 2023-04-29 ENCOUNTER — APPOINTMENT (OUTPATIENT)
Dept: LAB | Facility: HOSPITAL | Age: 23
End: 2023-04-29
Attending: INTERNAL MEDICINE

## 2023-04-29 DIAGNOSIS — E55.9 VITAMIN D DEFICIENCY: ICD-10-CM

## 2023-04-29 DIAGNOSIS — Z13.6 SCREENING FOR CARDIOVASCULAR CONDITION: ICD-10-CM

## 2023-04-29 DIAGNOSIS — Z13.1 SCREENING FOR DIABETES MELLITUS: ICD-10-CM

## 2023-04-29 LAB
25(OH)D3 SERPL-MCNC: 14.4 NG/ML (ref 30–100)
ALBUMIN SERPL BCP-MCNC: 4.4 G/DL (ref 3.5–5)
ALP SERPL-CCNC: 70 U/L (ref 34–104)
ALT SERPL W P-5'-P-CCNC: 10 U/L (ref 7–52)
ANION GAP SERPL CALCULATED.3IONS-SCNC: 6 MMOL/L (ref 4–13)
AST SERPL W P-5'-P-CCNC: 11 U/L (ref 13–39)
BILIRUB SERPL-MCNC: 0.34 MG/DL (ref 0.2–1)
BUN SERPL-MCNC: 14 MG/DL (ref 5–25)
CALCIUM SERPL-MCNC: 9.2 MG/DL (ref 8.4–10.2)
CHLORIDE SERPL-SCNC: 108 MMOL/L (ref 96–108)
CHOLEST SERPL-MCNC: 148 MG/DL
CO2 SERPL-SCNC: 24 MMOL/L (ref 21–32)
CREAT SERPL-MCNC: 0.65 MG/DL (ref 0.6–1.3)
GFR SERPL CREATININE-BSD FRML MDRD: 125 ML/MIN/1.73SQ M
GLUCOSE P FAST SERPL-MCNC: 75 MG/DL (ref 65–99)
HDLC SERPL-MCNC: 48 MG/DL
LDLC SERPL CALC-MCNC: 89 MG/DL (ref 0–100)
NONHDLC SERPL-MCNC: 100 MG/DL
POTASSIUM SERPL-SCNC: 3.6 MMOL/L (ref 3.5–5.3)
PROT SERPL-MCNC: 7.3 G/DL (ref 6.4–8.4)
SODIUM SERPL-SCNC: 138 MMOL/L (ref 135–147)
TRIGL SERPL-MCNC: 53 MG/DL

## 2023-05-15 DIAGNOSIS — E04.1 THYROID NODULE: Primary | ICD-10-CM

## 2023-06-02 ENCOUNTER — HOSPITAL ENCOUNTER (OUTPATIENT)
Dept: ULTRASOUND IMAGING | Facility: HOSPITAL | Age: 23
End: 2023-06-02
Payer: COMMERCIAL

## 2023-06-02 DIAGNOSIS — E04.1 THYROID NODULE: ICD-10-CM

## 2023-06-02 PROCEDURE — 76536 US EXAM OF HEAD AND NECK: CPT

## 2023-06-02 NOTE — LETTER
[address] [place] [address]  [facility] [address]      June 9, 2023    MRN: [id number]     Phone: [phone]     Dear Ms  Brittani Morales recently had a(n) Ultrasound performed on 6/2/2023 at  [address] [place] [address] that was requested by Tana Green MD  The study was reviewed by a radiologist, which is a physician who specializes in medical imaging  The radiologist issued a report describing his or her findings  In that report there was a finding that the radiologist felt warranted further discussion with your health care provider and that discussion would be beneficial to you  The results were sent to Tana Green MD on 06/06/2023  3:16 PM  We recommend that you contact Tana Green MD at [phone] or set up an appointment to discuss the results of the imaging test  If you have already heard from Tana Green MD regarding the results of your study, you can disregard this letter  This letter is not meant to alarm you, but intended to encourage you to follow-up on your results with the provider that sent you for the imaging study  In addition, we have enclosed answers to frequently asked questions by other patients who have also received a letter to review results with their health care provider (see page two)  Thank you for choosing [facility] for your medical imaging needs  FREQUENTLY ASKED QUESTIONS    Why am I receiving this letter? [facility] requires us to notify patients who have findings on imaging exams that may require more testing or follow-up with a health professional within the next 3 months  How serious is the finding on the imaging test?  This letter is sent to all patients who may need follow-up or more testing within the next 3 months    Receiving this letter does not necessarily mean you have a life-threatening imaging finding or disease  Recommendations in the radiologist’s imaging report are general in nature and it is up to your healthcare provider to say whether those recommendations make sense for your situation  You are strongly encouraged to talk to your health care provider about the results and ask whether additional steps need to be taken  Where can I get a copy of the final report for my recent radiology exam?  To get a full copy of the report you can access your records online at [email or web address] or please contact [facility] Records Department at [phone] Monday through Friday between 8 am and 6 pm          What do I need to do now? Please contact your health care provider who requested the imaging study to discuss what further actions (if any) are needed  You may have already heard from (your ordering provider) in regard to this test in which case you can disregard this letter  NOTICE IN ACCORDANCE WITH THE [place] STATE “PATIENT TEST RESULT INFORMATION ACT OF 2018”    You are receiving this notice as a result of a determination by your diagnostic imaging service that further discussions of your test results are warranted and would be beneficial to you  The complete results of your test or tests have been or will be sent to the health care practitioner that ordered the test or tests  It is recommended that you contact your health care practitioner to discuss your results as soon as possible

## 2023-06-06 NOTE — RESULT ENCOUNTER NOTE
Call patient: thyroid US shows one nodule that we have to follow with a repeat ultrasound next year but she does not need to have biopsy!

## 2023-08-14 ENCOUNTER — CLINICAL SUPPORT (OUTPATIENT)
Dept: FAMILY MEDICINE CLINIC | Facility: HOSPITAL | Age: 23
End: 2023-08-14
Payer: COMMERCIAL

## 2023-08-14 DIAGNOSIS — Z23 NEED FOR VACCINATION TO PREVENT TUBERCULOSIS: Primary | ICD-10-CM

## 2023-08-14 PROCEDURE — 86580 TB INTRADERMAL TEST: CPT

## 2023-08-16 ENCOUNTER — CLINICAL SUPPORT (OUTPATIENT)
Dept: FAMILY MEDICINE CLINIC | Facility: HOSPITAL | Age: 23
End: 2023-08-16

## 2023-08-16 DIAGNOSIS — Z11.1 ENCOUNTER FOR PPD SKIN TEST READING: Primary | ICD-10-CM

## 2023-08-16 LAB
INDURATION: 0 MM
TB SKIN TEST: NEGATIVE

## 2023-08-16 PROCEDURE — 99024 POSTOP FOLLOW-UP VISIT: CPT

## 2023-10-24 ENCOUNTER — OFFICE VISIT (OUTPATIENT)
Dept: OBGYN CLINIC | Facility: CLINIC | Age: 23
End: 2023-10-24
Payer: COMMERCIAL

## 2023-10-24 VITALS
WEIGHT: 188 LBS | DIASTOLIC BLOOD PRESSURE: 70 MMHG | BODY MASS INDEX: 29.51 KG/M2 | HEIGHT: 67 IN | SYSTOLIC BLOOD PRESSURE: 130 MMHG

## 2023-10-24 DIAGNOSIS — E04.1 THYROID NODULE: ICD-10-CM

## 2023-10-24 DIAGNOSIS — E23.7 PITUITARY ABNORMALITY (HCC): ICD-10-CM

## 2023-10-24 DIAGNOSIS — G70.00 MYASTHENIA GRAVIS (HCC): ICD-10-CM

## 2023-10-24 DIAGNOSIS — G47.33 OBSTRUCTIVE SLEEP APNEA: ICD-10-CM

## 2023-10-24 DIAGNOSIS — Z01.419 WOMEN'S ANNUAL ROUTINE GYNECOLOGICAL EXAMINATION: Primary | ICD-10-CM

## 2023-10-24 DIAGNOSIS — Z12.4 SCREENING FOR CERVICAL CANCER: ICD-10-CM

## 2023-10-24 DIAGNOSIS — Z11.3 SCREEN FOR STD (SEXUALLY TRANSMITTED DISEASE): ICD-10-CM

## 2023-10-24 DIAGNOSIS — G43.009 MIGRAINE WITHOUT AURA AND WITHOUT STATUS MIGRAINOSUS, NOT INTRACTABLE: ICD-10-CM

## 2023-10-24 DIAGNOSIS — N92.6 IRREGULAR MENSES: ICD-10-CM

## 2023-10-24 DIAGNOSIS — Z30.431 IUD CHECK UP: ICD-10-CM

## 2023-10-24 PROCEDURE — 87491 CHLMYD TRACH DNA AMP PROBE: CPT | Performed by: OBSTETRICS & GYNECOLOGY

## 2023-10-24 PROCEDURE — S0612 ANNUAL GYNECOLOGICAL EXAMINA: HCPCS | Performed by: OBSTETRICS & GYNECOLOGY

## 2023-10-24 PROCEDURE — 87591 N.GONORRHOEAE DNA AMP PROB: CPT | Performed by: OBSTETRICS & GYNECOLOGY

## 2023-10-24 PROCEDURE — G0145 SCR C/V CYTO,THINLAYER,RESCR: HCPCS | Performed by: OBSTETRICS & GYNECOLOGY

## 2023-10-24 NOTE — PATIENT INSTRUCTIONS
Pap every 3 years if normal, STI testing as indicated, exercise most days of week, obtain appropriate diet and hydration, Calcium 1000mg + 600 vit D daily, birth control as directed (ACHES reviewed). Benefits, risks and alternatives discussed/reviewed. Condom use when sexually active for sexually transmitted infection prevention. HPV 9 vaccine recommended through age 39. Check with your insurance for coverage. If covered, call office to schedule start of vaccine series. Annual mammogram starting at age 36, monthly breast self exam. Tereza Griffiths   at red light or at least  20 times a day.

## 2023-10-24 NOTE — PROGRESS NOTES
[address]  [address] [facility], [address], [place], [address] [facility],[address]W    ASSESSMENT/PLAN: Kamini Randolph is a 21 y.o. [address] [facility] who presents for annual gynecologic exam.    Encounter for routine gynecologic examination  - Routine well woman exam completed today. - HPV Vaccination status: Immunization series complete  - STI screening offered including HIV testing: Declined  - Contraceptive counseling discussed. Current contraception: condoms or Mirena IUD since 2016 :     Additional problems addressed during this visit:  1. Women's annual routine gynecological examination    2. Hypertrophy of adenoids    3. Myasthenia gravis ([address])    4. Pituitary abnormality ([address])    5. Thyroid nodule    6. Obstructive sleep apnea    7. IUD check up  Comments:  2022    22 yo   here for    wellness exam   Hx of   iud  since   2022.  + Heavier and irregular  bleeding . Thyroid screen pending  . Differentials of  IUD vs   abn placement  vs polyp vs  thyroid off  dw pt. To get tv us  to assess the uterus and ovaries. TSH and T4.  TC  OCP for  3 cycles to stabilize the lining. Pt will check w Neurology. CC:  Annual Gynecologic Examination    HPI: Kamini Randolph is a 21 y.o. [address] [facility] who presents for annual gynecologic examination. 22 yo   Pt w  Mirena   IUD placed  in 2022.  + bleeding   off and on since . Lately has been brown in color. Cycles  prior to  IUD 28-30 d  for 4-5  d. No IMB  prior use of  OCP wanted more permanent  contraception. Same partner . No pain w  relations . 2022 dx  myasthenia gravidis  + thymecomy. Followed by neurology      The following portions of the patient's history were reviewed and updated as appropriate: She  has a past medical history of COVID (2022), Depression, History of migraine, Low vitamin D level, Myasthenia gravis ([address]) (2022), and Uses birth control.   She  has a past surgical history that includes Tonsillectomy and adenoidectomy; ADENOIDECTOMY; pr thymectomy prtl/tot transcervical appr spx (Left, 12/16/2022); pr [facility] incl fluor gdnce dx w/cell washg spx (N/A, 12/16/2022); and Total thymectomy (Left, 12/16/2022). Her family history includes Breast cancer in her paternal grandmother; Diabetes in her father; Hypertension in her father and maternal grandmother; No Known Problems in her mother. She  reports that she has never smoked. She has never used smokeless tobacco. She reports current alcohol use of about 10.0 standard drinks of alcohol per week. She reports that she does not use drugs. Current Outpatient Medications   Medication Sig Dispense Refill    Baclofen 5 MG TABS Take 1 po HS 90 tablet 1    cholecalciferol (VITAMIN D3) 1,000 units tablet Take 1,000 Units by mouth daily      ferrous sulfate 325 (65 Fe) mg tablet Take 325 mg by mouth daily at bedtime      Fiber Complete TABS Take 2 tablets by mouth daily      Levonorgestrel (MIRENA) 20 MCG/DAY IUD 1 each by Intrauterine route once      ondansetron (Zofran ODT) 4 mg disintegrating tablet Take 1 tablet (4 mg total) by mouth every 6 (six) hours as needed for nausea or vomiting 20 tablet 0    pyridostigmine (MESTINON) 60 mg tablet Take 0.5 tablets (30 mg total) by mouth 4 (four) times a day 180 tablet 3    rizatriptan (Maxalt-MLT) 10 mg disintegrating tablet Take 1 tablet (10 mg total) by mouth as needed for migraine Take at the onset of migraine; if symptoms continue or return, may take another dose at least 2 hours after first dose. Take no more than 2 doses in a day. 18 tablet 1    topiramate (TOPAMAX) 25 mg tablet 2 tab po qhs 180 tablet 3     No current facility-administered medications for this visit. She is allergic to augmentin [amoxicillin-pot clavulanate] and fish-derived products - food allergy. .    Review of Systems   Constitutional:  Negative for chills and fever. HENT:  Negative for ear pain and sore throat. Eyes:  Negative for pain and visual disturbance.    Respiratory: Negative for cough and shortness of breath. Cardiovascular:  Negative for chest pain and palpitations. Gastrointestinal:  Negative for abdominal pain and vomiting. Genitourinary:  Positive for menstrual problem, vaginal bleeding and vaginal discharge. Negative for dysuria, hematuria, pelvic pain and urgency. Musculoskeletal:  Negative for arthralgias and back pain. Skin:  Negative for color change and rash. Neurological: Negative. Negative for seizures and syncope. Hematological: Negative. Psychiatric/Behavioral: Negative. All other systems reviewed and are negative. Objective:  /70 (BP Location: Left arm, Patient Position: Sitting, Cuff Size: Standard)   Ht 5' 7" (1.702 m)   Wt 85.3 kg (188 lb)   LMP 08/26/2023 (Exact Date)   BMI 29.44 kg/m²    Physical Exam  Vitals and nursing note reviewed. Constitutional:       Appearance: Normal appearance. HENT:      Head: Normocephalic. Cardiovascular:      Rate and Rhythm: Normal rate and regular rhythm. Pulses: Normal pulses. Heart sounds: Normal heart sounds. Pulmonary:      Effort: Pulmonary effort is normal.      Breath sounds: Normal breath sounds. Chest:      Chest wall: No mass, lacerations, swelling, tenderness or edema. Breasts: Shawn Score is 4. Breasts are symmetrical.      Right: Normal. No swelling, bleeding, inverted nipple, mass, nipple discharge, skin change or tenderness. Left: No swelling, bleeding, inverted nipple, mass, nipple discharge, skin change or tenderness. Abdominal:      General: Abdomen is flat. Bowel sounds are normal.      Palpations: Abdomen is soft. Genitourinary:     General: Normal vulva. Exam position: Lithotomy position. Pubic Area: No rash. Shawn stage (genital): 4.      Labia:         Right: No rash, tenderness or lesion. Left: No rash, tenderness or lesion. Urethra: No urethral pain, urethral swelling or urethral lesion. Vagina: No signs of injury and foreign body. Bleeding present. No erythema, tenderness or lesions. Cervix: Cervical bleeding present. No cervical motion tenderness, discharge or lesion. Uterus: Normal.       Adnexa: Right adnexa normal and left adnexa normal.      Rectum: Normal.            Comments: Iud string in os    Brown mucoid   dc noted    Musculoskeletal:         General: Normal range of motion. Cervical back: Neck supple. Lymphadenopathy:      Upper Body:      Right upper body: No supraclavicular, axillary or pectoral adenopathy. Left upper body: No supraclavicular, axillary or pectoral adenopathy. Lower Body: No right inguinal adenopathy. No left inguinal adenopathy. Skin:     General: Skin is warm and dry. Neurological:      Mental Status: She is alert.    Psychiatric:         Mood and Affect: Mood normal.

## 2023-10-27 LAB
C TRACH DNA SPEC QL NAA+PROBE: NEGATIVE
N GONORRHOEA DNA SPEC QL NAA+PROBE: NEGATIVE

## 2023-10-30 LAB
LAB AP GYN PRIMARY INTERPRETATION: NORMAL
Lab: NORMAL

## 2023-11-07 ENCOUNTER — HOSPITAL ENCOUNTER (OUTPATIENT)
Dept: ULTRASOUND IMAGING | Facility: HOSPITAL | Age: 23
Discharge: HOME | End: 2023-11-07
Payer: COMMERCIAL

## 2023-11-07 DIAGNOSIS — N92.6 IRREGULAR MENSES: ICD-10-CM

## 2023-11-07 PROCEDURE — 76856 US EXAM PELVIC COMPLETE: CPT

## 2023-11-07 PROCEDURE — 76830 TRANSVAGINAL US NON-OB: CPT

## 2023-11-08 ENCOUNTER — TELEPHONE (OUTPATIENT)
Dept: OBGYN CLINIC | Facility: CLINIC | Age: 23
End: 2023-11-08

## 2023-11-08 NOTE — TELEPHONE ENCOUNTER
Bryant Sanders called to report significant findings on pelvic u/s. IUD malpositioned in lower uterine segment. Message sent to on call provider( Dr Delsie Jeans) in Gabrielle's absence> (copied from tiger text) Per my review of report, can wait until [facility] returns unless pt with pain or bleeding and wants removed immediately. Can schedule for removal in office with anyone. L/M on Frieda Listen v/m to return call.

## 2023-11-08 NOTE — TELEPHONE ENCOUNTER
Spoke with Oseas Padilla, she is not having heavy bleeding or increased pain. She is ok to wait for Gabrielle's recommendation on Thursday. Recommended pelvic rest until receives call from [facility] with recommendation. Patient in agreement.

## 2023-11-13 ENCOUNTER — APPOINTMENT (OUTPATIENT)
Dept: LAB | Facility: HOSPITAL | Age: 23
End: 2023-11-13
Payer: COMMERCIAL

## 2023-11-13 DIAGNOSIS — E04.1 THYROID NODULE: ICD-10-CM

## 2023-11-13 LAB — TSH SERPL DL<=0.05 MIU/L-ACNC: 1.66 UIU/ML (ref 0.45–4.5)

## 2023-11-13 PROCEDURE — 36415 COLL VENOUS BLD VENIPUNCTURE: CPT

## 2023-11-13 PROCEDURE — 84443 ASSAY THYROID STIM HORMONE: CPT

## 2023-11-14 ENCOUNTER — TELEPHONE (OUTPATIENT)
Dept: OBGYN CLINIC | Facility: CLINIC | Age: 23
End: 2023-11-14

## 2023-12-06 ENCOUNTER — OFFICE VISIT (OUTPATIENT)
Dept: NEUROLOGY | Facility: CLINIC | Age: 23
End: 2023-12-06
Payer: COMMERCIAL

## 2023-12-06 VITALS
HEART RATE: 88 BPM | OXYGEN SATURATION: 98 % | DIASTOLIC BLOOD PRESSURE: 64 MMHG | SYSTOLIC BLOOD PRESSURE: 122 MMHG | WEIGHT: 191.2 LBS | TEMPERATURE: 97.6 F | BODY MASS INDEX: 30.01 KG/M2 | HEIGHT: 67 IN | RESPIRATION RATE: 16 BRPM

## 2023-12-06 DIAGNOSIS — G43.009 MIGRAINE WITHOUT AURA AND WITHOUT STATUS MIGRAINOSUS, NOT INTRACTABLE: ICD-10-CM

## 2023-12-06 DIAGNOSIS — R51.9 FREQUENT HEADACHES: ICD-10-CM

## 2023-12-06 PROCEDURE — 99213 OFFICE O/P EST LOW 20 MIN: CPT | Performed by: PSYCHIATRY & NEUROLOGY

## 2023-12-06 RX ORDER — RIZATRIPTAN BENZOATE 10 MG/1
10 TABLET, ORALLY DISINTEGRATING ORAL AS NEEDED
Qty: 18 TABLET | Refills: 1 | Status: SHIPPED | OUTPATIENT
Start: 2023-12-06

## 2023-12-06 RX ORDER — TOPIRAMATE 25 MG/1
TABLET ORAL
Qty: 180 TABLET | Refills: 3 | Status: SHIPPED | OUTPATIENT
Start: 2023-12-06

## 2023-12-06 RX ORDER — METOCLOPRAMIDE 10 MG/1
10 TABLET ORAL 2 TIMES DAILY PRN
Qty: 25 TABLET | Refills: 1 | Status: SHIPPED | OUTPATIENT
Start: 2023-12-06

## 2023-12-06 NOTE — ASSESSMENT & PLAN NOTE
Bobby Estrada is a 80-year-old woman with a history of antibody positive myasthenia gravis. She had a robotic assisted thymectomy December 2022. She is currently on Mestinon 30 mg up to 3 times a day. She utilizes Mestinon twice a day 1 in the morning and 1 at noon. It does provide significant benefit. She does have intermittent days that are more severe than others and will utilize an extra Mestinon 30 mg. At this point she does not require prednisone or any type of immunosuppressive medications. Today's MG ADL score was 1. Overall she reports improvement since her thymectomy.

## 2023-12-06 NOTE — PATIENT INSTRUCTIONS
If migraine  for more then  48 hours , take  naprosyn 200mg 3 tablets  every 8 hours with reglan daily for 3 days then stop .  Take naprosyn with food       No immunosuppressive drugs     Take mestinon up to three times a day

## 2023-12-06 NOTE — PROGRESS NOTES
Patient ID: Jeanne Hernandez is a 21 y.o. female. Assessment/Plan:    Myasthenia gravis ([address])  Roseanne Olivo is a 57-year-old woman with a history of antibody positive myasthenia gravis. She had a robotic assisted thymectomy December 2022. She is currently on Mestinon 30 mg up to 3 times a day. She utilizes Mestinon twice a day 1 in the morning and 1 at noon. It does provide significant benefit. She does have intermittent days that are more severe than others and will utilize an extra Mestinon 30 mg. At this point she does not require prednisone or any type of immunosuppressive medications. Today's MG ADL score was 1. Overall she reports improvement since her thymectomy. Migraine without aura and without status migrainosus, not intractable  She does have a history of migraine headaches which are occurring infrequently 1 or 2 times a month however since she has started her new job as a long-term stop her aches did increase. On Monday insist insist they it lasted up to 4 days. They are now occurring 4-5 times a month. Maxalt did provide some benefit for the migraines however when the headache lasted 4 days Maxalt was ineffective   I did refill the maxalt  but in additionally I did provide her with a regimen of naproxen 600 mg every 8 hours with Reglan for 3 days. We do need to avoid the use of steroid. We did discuss potential birth control pills. It can affect the Topamax. She will have appointment with OB/GYN in the next month and discuss her further treatments. Diagnoses and all orders for this visit:    Migraine without aura and without status migrainosus, not intractable  -     metoclopramide (Reglan) 10 mg tablet; Take 1 tablet (10 mg total) by mouth 2 (two) times a day as needed (migraine)  -     rizatriptan (Maxalt-MLT) 10 mg disintegrating tablet;  Take 1 tablet (10 mg total) by mouth as needed for migraine Take at the onset of migraine; if symptoms continue or return, may take another dose at least 2 hours after first dose. Take no more than 2 doses in a day. Frequent headaches  -     metoclopramide (Reglan) 10 mg tablet; Take 1 tablet (10 mg total) by mouth 2 (two) times a day as needed (migraine)  -     topiramate (TOPAMAX) 25 mg tablet; 2 tab po qhs       Subjective:    Kiersten Wagoner is a 21 y.o. female who presents today for neurologic follow up. She underwent a robotic assisted thymectomy in December 2022 after 3 to 4 weeks she was last evaluated here several months ago. Overall she does report improvement. She does have days that are more severe characterized by achiness and muscle tenderness. She denies any double vision. She does utilize Mestinon 30 mg up to twice a day. She will notice more severe days and she may require 1/3 tablet. Recently she denies any difficulty going up and down steps difficulty with her speech or swallowing. Today's MG ADL score was 1. She does have a history of migraine headaches which she utilizes Topamax 50 mg a day for prevention. Overall her migraine headaches were well-controlled until she started a new job. They are now occurring approximately 5 times a month. On 1 occasion it lasted for 4 days requiring the use of resight triptan. It was occurring at the time of her severe myasthenic symptoms. She did have a question regarding myasthenia and birth control pills. There is no contraindication however Topamax can be less effective with birth control pills. She does have an appoint with her OB/GYN in approxi-1 month to  discuss further options. Additional workup was ordered at time of her visit in August 2022:  --Labs 8/17/22:  Acetylcholine receptor binding >80.00, acetylcholine receptor blocking 49%, MUSK ab negative. B12 516, vit D 15.5, negative EDUARDO, RF, Sjogren's.   Sed rate nl 13, copper slightly low 77, normal magnesium 2.2.  --EMG RUE/RLE 9/15/22 was normal  --CT chest w contrast 9/13/22 residual thymic tissue noted to be somewhat full for patient's age and suggest thymic hyperplasia. No findings suspicious for thymoma. Incidental thyroid nodule, incidental 1.9cm cyst at the level of the suprasternal notch just left of midline. --MRI t-spine 9/14/22 normal cord signal. Incidentally seen 1.3cm right thyroid nodule. 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 She is able to ambulate. Over the summer she was able to hike on a trip to [facility] [place]. 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 This stress,  she does admit can result in migraine headaches. Previous attempts to increase the Topamax did resolved and tingling of the fingertips                                       The following portions of the patient's history were reviewed and updated as appropriate: She  has a past medical history of COVID (08/2022), Depression, Head injury (2018), Headache, tension-type (2012), History of migraine, Low vitamin D level, Migraine, Myasthenia gravis ([address]) (08/2022), and Uses birth control. She  has a past surgical history that includes Tonsillectomy and adenoidectomy; ADENOIDECTOMY; pr thymectomy prtl/tot transcervical appr spx (Left, 12/16/2022); pr brnchsc incl fluor gdnce dx w/cell washg spx (N/A, 12/16/2022); and Total thymectomy (Left, 12/16/2022). Her family history includes Aneurysm in her paternal grandfather; Breast cancer in her paternal grandmother; Diabetes in her father; Hypertension in her father and maternal grandmother; Migraines in her mother; Stroke in her paternal aunt. She  reports that she has never smoked.  She has never used smokeless tobacco. She reports current alcohol use of about 2.0 standard drinks of alcohol per week. She reports that she does not use drugs. Current Outpatient Medications   Medication Sig Dispense Refill    Baclofen 5 MG TABS Take 1 po HS 90 tablet 1    cholecalciferol (VITAMIN D3) 1,000 units tablet Take 1,000 Units by mouth daily      ferrous sulfate 325 (65 Fe) mg tablet Take 325 mg by mouth daily at bedtime      Fiber Complete TABS Take 2 tablets by mouth daily      Levonorgestrel (MIRENA) 20 MCG/DAY IUD 1 each by Intrauterine route once      metoclopramide (Reglan) 10 mg tablet Take 1 tablet (10 mg total) by mouth 2 (two) times a day as needed (migraine) 25 tablet 1    ondansetron (Zofran ODT) 4 mg disintegrating tablet Take 1 tablet (4 mg total) by mouth every 6 (six) hours as needed for nausea or vomiting 20 tablet 0    pyridostigmine (MESTINON) 60 mg tablet Take 0.5 tablets (30 mg total) by mouth 4 (four) times a day 180 tablet 3    rizatriptan (Maxalt-MLT) 10 mg disintegrating tablet Take 1 tablet (10 mg total) by mouth as needed for migraine Take at the onset of migraine; if symptoms continue or return, may take another dose at least 2 hours after first dose. Take no more than 2 doses in a day. 18 tablet 1    topiramate (TOPAMAX) 25 mg tablet 2 tab po qhs 180 tablet 3     No current facility-administered medications for this visit. She is allergic to augmentin [amoxicillin-pot clavulanate] and fish-derived products - food allergy. .         Objective:    Blood pressure 122/64, pulse 88, temperature 97.6 °F (36.4 °C), temperature source Temporal, resp. rate 16, height 5' 7" (1.702 m), weight 86.7 kg (191 lb 3.2 oz), SpO2 98 %. Physical Exam  Eyes:      General: Lids are normal.      Extraocular Movements: Extraocular movements intact. Pupils: Pupils are equal, round, and reactive to light.    Neurological:      Motor: Motor strength is normal.     Deep Tendon Reflexes:      Reflex Scores:       Patellar reflexes are 1+ on the right side and 1+ on the left side. Achilles reflexes are 1+ on the right side and 1+ on the left side. Neurological Exam  Mental Status  Awake, alert and oriented to person, place and time. Oriented to person, place and time. Language is fluent with no aphasia. Cranial Nerves  CN II: Visual acuity is normal. Visual fields full to confrontation. CN III, IV, VI: Extraocular movements intact bilaterally. Normal lids and orbits bilaterally. Pupils equal round and reactive to light bilaterally. CN V: Facial sensation is normal.  CN VII: Full and symmetric facial movement. CN VIII: Hearing is normal.  CN IX, X: Palate elevates symmetrically. Normal gag reflex. CN XI: Shoulder shrug strength is normal.  CN XII: Tongue midline without atrophy or fasciculations. Motor  Normal muscle bulk throughout. No fasciculations present. Strength is 5/5 throughout all four extremities. Sensory  Light touch is normal in upper and lower extremities. Temperature is normal in upper and lower extremities. Reflexes                                            Right                      Left  Patellar                                1+                         1+  Achilles                                1+                         1+    Coordination  Right: Finger-to-nose normal.Left: Finger-to-nose normal.    Gait   Able to rise from chair without using arms. Review of systems as below was reviewed and reviewed with the patient at today's visit    ROS:    Review of Systems   Constitutional:  Negative for appetite change, fatigue and fever. HENT: Negative. Negative for hearing loss, tinnitus, trouble swallowing and voice change. Eyes: Negative. Negative for photophobia, pain and visual disturbance. Respiratory: Negative. Negative for shortness of breath. Cardiovascular: Negative. Negative for palpitations. Gastrointestinal: Negative. Negative for nausea and vomiting. Endocrine: Negative. Negative for cold intolerance. Genitourinary: Negative. Negative for dysuria, frequency and urgency. Musculoskeletal:  Negative for back pain, gait problem, myalgias and neck pain. Skin: Negative. Negative for rash. Allergic/Immunologic: Negative. Neurological: Negative. Negative for dizziness, tremors, seizures, syncope, facial asymmetry, speech difficulty, weakness, light-headedness, numbness and headaches. Hematological: Negative. Does not bruise/bleed easily. Psychiatric/Behavioral: Negative. Negative for confusion, hallucinations and sleep disturbance. All other systems reviewed and are negative. She can return to our offices in 6 months.

## 2023-12-06 NOTE — ASSESSMENT & PLAN NOTE
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 On Monday insist insist they it lasted up to 4 days. They are now occurring 4-5 times a month. Maxalt did provide some benefit for the migraines however when the headache lasted 4 days Maxalt was ineffective   I did refill the maxalt  but in additionally I did provide her with a regimen of naproxen 600 mg every 8 hours with Reglan for 3 days. We do need to avoid the use of steroid. We did discuss potential birth control pills. It can affect the Topamax. She will have appointment with OB/GYN in the next month and discuss her further treatments.

## 2023-12-23 PROBLEM — Z12.4 SCREENING FOR CERVICAL CANCER: Status: RESOLVED | Noted: 2023-10-24 | Resolved: 2023-12-23

## 2023-12-23 PROBLEM — Z01.419 WOMEN'S ANNUAL ROUTINE GYNECOLOGICAL EXAMINATION: Status: RESOLVED | Noted: 2023-10-24 | Resolved: 2023-12-23

## 2023-12-23 PROBLEM — Z11.3 SCREEN FOR STD (SEXUALLY TRANSMITTED DISEASE): Status: RESOLVED | Noted: 2023-10-24 | Resolved: 2023-12-23

## 2023-12-26 ENCOUNTER — OFFICE VISIT (OUTPATIENT)
Dept: OBGYN CLINIC | Facility: CLINIC | Age: 23
End: 2023-12-26
Payer: COMMERCIAL

## 2023-12-26 VITALS
BODY MASS INDEX: 30.98 KG/M2 | WEIGHT: 197.4 LBS | SYSTOLIC BLOOD PRESSURE: 132 MMHG | DIASTOLIC BLOOD PRESSURE: 84 MMHG | HEIGHT: 67 IN

## 2023-12-26 DIAGNOSIS — Z30.432 ENCOUNTER FOR IUD REMOVAL: Primary | ICD-10-CM

## 2023-12-26 PROCEDURE — 99212 OFFICE O/P EST SF 10 MIN: CPT | Performed by: OBSTETRICS & GYNECOLOGY

## 2023-12-26 PROCEDURE — 58301 REMOVE INTRAUTERINE DEVICE: CPT | Performed by: OBSTETRICS & GYNECOLOGY

## 2024-02-05 ENCOUNTER — TELEPHONE (OUTPATIENT)
Dept: NEUROLOGY | Facility: CLINIC | Age: 24
End: 2024-02-05

## 2024-02-05 NOTE — TELEPHONE ENCOUNTER
Called the patient no answer Lmom to call us back to cancel her appt on 7/10/24 with Dr nicholas . R/s it on 7/3 or 7/24  as per patient requested time.

## 2024-02-15 ENCOUNTER — PROCEDURE VISIT (OUTPATIENT)
Dept: OBGYN CLINIC | Facility: CLINIC | Age: 24
End: 2024-02-15

## 2024-02-15 VITALS
DIASTOLIC BLOOD PRESSURE: 86 MMHG | WEIGHT: 195.4 LBS | SYSTOLIC BLOOD PRESSURE: 128 MMHG | HEIGHT: 67 IN | BODY MASS INDEX: 30.67 KG/M2

## 2024-02-15 DIAGNOSIS — Z32.02 ENCOUNTER FOR PREGNANCY TEST, RESULT NEGATIVE: ICD-10-CM

## 2024-02-15 DIAGNOSIS — Z30.430 ENCOUNTER FOR INSERTION OF MIRENA IUD: Primary | ICD-10-CM

## 2024-02-15 LAB — SL AMB POCT URINE HCG: NORMAL

## 2024-02-15 NOTE — PROGRESS NOTES
Iud insertions    Date/Time: 2/15/2024 3:00 PM    Performed by: Hank Luciano MD  Authorized by: Hank Luciano MD    Verbal consent obtained?: Yes    Written consent obtained?: Yes    Risks and benefits: Risks, benefits and alternatives were discussed    Consent given by:  Patient  Time Out:     Time out: Immediately prior to the procedure a time out was called    Patient states understanding of procedure being performed: Yes    Procedure:     Pelvic exam performed: yes      Negative urine pregnancy test: yes      Cervix cleaned and prepped: yes      Speculum placed in vagina: yes      Tenaculum applied to cervix: yes      Uterus sounded: yes      Uterus sound depth (cm):  7    IUD inserted with no complications: yes      IUD type:  Mirena    Strings trimmed: yes    Post-procedure:     Patient tolerated procedure well: yes    Comments:      Monsel's placed due to bleeding at tenaculum site

## 2024-04-24 ENCOUNTER — OFFICE VISIT (OUTPATIENT)
Dept: FAMILY MEDICINE CLINIC | Facility: HOSPITAL | Age: 24
End: 2024-04-24
Payer: COMMERCIAL

## 2024-04-24 VITALS
RESPIRATION RATE: 16 BRPM | BODY MASS INDEX: 29.82 KG/M2 | TEMPERATURE: 98.3 F | WEIGHT: 190 LBS | HEIGHT: 67 IN | OXYGEN SATURATION: 99 % | SYSTOLIC BLOOD PRESSURE: 120 MMHG | DIASTOLIC BLOOD PRESSURE: 80 MMHG | HEART RATE: 75 BPM

## 2024-04-24 DIAGNOSIS — G70.00 MYASTHENIA GRAVIS (HCC): ICD-10-CM

## 2024-04-24 DIAGNOSIS — D50.9 IRON DEFICIENCY ANEMIA, UNSPECIFIED IRON DEFICIENCY ANEMIA TYPE: ICD-10-CM

## 2024-04-24 DIAGNOSIS — E23.7 PITUITARY ABNORMALITY (HCC): ICD-10-CM

## 2024-04-24 DIAGNOSIS — E04.1 THYROID NODULE: ICD-10-CM

## 2024-04-24 DIAGNOSIS — E66.3 OVERWEIGHT (BMI 25.0-29.9): ICD-10-CM

## 2024-04-24 DIAGNOSIS — H61.23 HEARING LOSS OF BOTH EARS DUE TO CERUMEN IMPACTION: ICD-10-CM

## 2024-04-24 DIAGNOSIS — Z00.00 ANNUAL PHYSICAL EXAM: Primary | ICD-10-CM

## 2024-04-24 PROCEDURE — 99395 PREV VISIT EST AGE 18-39: CPT | Performed by: INTERNAL MEDICINE

## 2024-04-24 PROCEDURE — 99213 OFFICE O/P EST LOW 20 MIN: CPT | Performed by: INTERNAL MEDICINE

## 2024-04-24 NOTE — ASSESSMENT & PLAN NOTE
Ultrasound of the thyroid gland in June last year showed a right thyroid nodule that requires follow-up but no biopsy.  Recheck TSH and ordered repeat ultrasound for follow-up of the right thyroid nodule

## 2024-04-24 NOTE — ASSESSMENT & PLAN NOTE
Patient has myasthenia gravis.  Her symptoms are controlled.  She takes Mestinon 3 times a day, sometimes 4 times a day.  Continue Mestinon

## 2024-04-24 NOTE — ASSESSMENT & PLAN NOTE
MRI of the brain in 2022 showed stable, enlarged pituitary gland.  Patient's her headaches are well-controlled.  Denies any change in vision.  Denies irregular menses or amenorrhea.    I will check patient's prolactin and TSH

## 2024-04-24 NOTE — ASSESSMENT & PLAN NOTE
She has overweight.  We discussed diet.  She is vegetarian.  She will try to cut down portion sizes.  I asked her to see our dietitian for further recommendations to help her lose weight.  I also referred her to sports  to help with exercises to lose weight.  She already takes Topamax with control of her headaches.    I asked her to get the lipid profile and COVID A1c done as part of the employee wellness program for this year

## 2024-04-24 NOTE — PROGRESS NOTES
ADULT ANNUAL PHYSICAL  [facility] [address]    NAME: Sunita Carranza  AGE: 23 y.o. SEX: female  : 2000     DATE: 2024     Assessment and Plan:     Problem List Items Addressed This Visit        Endocrine    Pituitary abnormality (HCC)     MRI of the brain in  showed stable, enlarged pituitary gland.  Patient's her headaches are well-controlled.  Denies any change in vision.  Denies irregular menses or amenorrhea.    I will check patient's prolactin and TSH         Relevant Orders    Prolactin    Thyroid nodule     Ultrasound of the thyroid gland in  last year showed a right thyroid nodule that requires follow-up but no biopsy.  Recheck TSH and ordered repeat ultrasound for follow-up of the right thyroid nodule         Relevant Orders    TSH, 3rd generation with Free T4 reflex    US thyroid       Nervous and Auditory    Myasthenia gravis (HCC)     Patient has myasthenia gravis.  Her symptoms are controlled.  She takes Mestinon 3 times a day, sometimes 4 times a day.  Continue Mestinon            Blood    Iron deficiency anemia     She has menorrhagia at times.  Denies signs of GI bleeding.  She is on iron sulfate supplements  I will check her hemoglobin and iron saturation         Relevant Orders    CBC and Platelet    Iron Panel (Includes Ferritin, Iron Sat%, Iron, and TIBC)       Other    Overweight (BMI 25.0-29.9)     She has overweight.  We discussed diet.  She is vegetarian.  She will try to cut down portion sizes.  I asked her to see our dietitian for further recommendations to help her lose weight.  I also referred her to sports  to help with exercises to lose weight.  She already takes Topamax with control of her headaches.    I asked her to get the lipid profile and COVID A1c done as part of the employee wellness program for this year         Relevant Orders    Ambulatory referral to Nutrition Services     Ambulatory referral to Sports    Other Visit Diagnoses     Annual physical exam    -  Primary    Hearing loss of both ears due to cerumen impaction        Relevant Orders    Ambulatory Referral to Otolaryngology          I obtained consent from patient to do follow-up problem visit in addition to annual health maintenance    Immunizations and preventive care screenings were discussed with patient today. Appropriate education was printed on patient's after visit summary.    Counseling:  Exercise: the importance of regular exercise/physical activity was discussed. Recommend exercise 3-5 times per week for at least 30 minutes.   Diet      Depression Screening and Follow-up Plan: Patient was screened for depression during today's encounter. They screened negative with a PHQ-2 score of 0.        Return in about 1 year (around 4/24/2025) for Recheck, Annual physical.     Chief Complaint:     Chief Complaint   Patient presents with   • Annual Exam      History of Present Illness:     Adult Annual Physical   Patient here for a comprehensive physical exam. The patient reports problems - as detailed above in the assessment and plan .    Diet and Physical Activity  Diet/Nutrition:  Patient is vegetarian .   Exercise: walking.      Depression Screening  PHQ-2/9 Depression Screening    Little interest or pleasure in doing things: 0 - not at all  Feeling down, depressed, or hopeless: 0 - not at all  PHQ-2 Score: 0  PHQ-2 Interpretation: Negative depression screen       General Health  Sleep: sleeps well.   Hearing: normal - bilateral.  Vision: wears glasses.   Dental: regular dental visits.       /GYN Health  Follows with gynecology? yes           Review of Systems:     Review of Systems   Constitutional:  Negative for fever.   HENT:  Negative for hearing loss.    Eyes:  Negative for visual disturbance.   Respiratory:  Negative for cough and shortness of breath.    Cardiovascular:  Negative for chest pain and  palpitations.   Gastrointestinal:  Negative for abdominal pain, blood in stool, constipation and diarrhea.   Genitourinary:  Positive for vaginal bleeding (At times she can have heavy bleeding). Negative for dysuria and hematuria.   Musculoskeletal:  Negative for arthralgias and myalgias.   Skin:  Negative for rash.   Neurological:  Negative for headaches (Headaches are controlled).   Psychiatric/Behavioral:  Negative for dysphoric mood.    All other systems reviewed and are negative.     Past Medical History:     Past Medical History:   Diagnosis Date   • COVID 08/2022    Cold sx   • Depression    • Head injury 2018    concussion for 1 week   • Headache, tension-type 2012   • History of migraine    • Low vitamin D level    • Migraine    • Myasthenia gravis (HCC) 08/2022   • Uses birth control       Past Surgical History:     Past Surgical History:   Procedure Laterality Date   • ADENOIDECTOMY     • NC BRNCHSC INCL FLUOR GDNCE DX W/CELL WASHG SPX N/A 12/16/2022    Procedure: BRONCHOSCOPY FLEXIBLE;  Surgeon: Fransico Prieto MD;  Location: [facility];  Service: Thoracic   • NC THYMECTOMY PRTL/TOT TRANSCERVICAL APPR SPX Left 12/16/2022    Procedure: THYMECTOMY THORACOSCOPIC W/ ROBOTICS;  Surgeon: Fransico Prieto MD;  Location: [facility];  Service: Thoracic   • TONSILLECTOMY AND ADENOIDECTOMY     • TOTAL THYMECTOMY Left 12/16/2022      Social History:     Social History     Socioeconomic History   • Marital status: Single     Spouse name: None   • Number of children: None   • Years of education: None   • Highest education level: None   Occupational History   • Occupation: student   Tobacco Use   • Smoking status: Never   • Smokeless tobacco: Never   Vaping Use   • Vaping status: Never Used   Substance and Sexual Activity   • Alcohol use: Yes     Alcohol/week: 2.0 standard drinks of alcohol     Types: 2 Standard drinks or equivalent per week     Comment: social   • Drug use: Never   • Sexual activity: Yes      Partners: Male     Birth control/protection: Condom Male, Emergency Contraception, I.U.D.     Comment: same partner   Other Topics Concern   • None   Social History Narrative    Lives with parents.      Social Determinants of Health     Financial Resource Strain: Low Risk  (4/16/2021)    Overall Financial Resource Strain (CARDIA)    • Difficulty of Paying Living Expenses: Not hard at all   Food Insecurity: No Food Insecurity (4/16/2021)    Hunger Vital Sign    • Worried About Running Out of Food in the Last Year: Never true    • Ran Out of Food in the Last Year: Never true   Transportation Needs: No Transportation Needs (4/16/2021)    PRAPARE - Transportation    • Lack of Transportation (Medical): No    • Lack of Transportation (Non-Medical): No   Physical Activity: Insufficiently Active (3/16/2020)    Exercise Vital Sign    • Days of Exercise per Week: 1 day    • Minutes of Exercise per Session: 60 min   Stress: No Stress Concern Present (3/16/2020)    [place] [employer] - Occupational Stress Questionnaire    • Feeling of Stress : Only a little   Social Connections: Unknown (3/16/2020)    Social Connection and Isolation Panel [NHANES]    • Frequency of Communication with Friends and Family: Patient declined    • Frequency of Social Gatherings with Friends and Family: Patient declined    • Attends Buddhism Services: Patient declined    • Active Member of Clubs or Organizations: Patient declined    • Attends Club or Organization Meetings: Patient declined    • Marital Status: Patient declined   Intimate Partner Violence: Unknown (3/16/2020)    Humiliation, Afraid, Rape, and Kick questionnaire    • Fear of Current or Ex-Partner: Patient declined    • Emotionally Abused: Patient declined    • Physically Abused: Patient declined    • Sexually Abused: Patient declined   Housing Stability: Not on file      Family History:     Family History   Problem Relation Age of Onset   • Migraines Mother    •  "Diabetes Father    • Hypertension Father    • Hypertension Maternal Grandmother    • Breast cancer Paternal Grandmother    • Aneurysm Paternal Grandfather    • Stroke Paternal Aunt    • Ovarian cancer Neg Hx    • Colon cancer Neg Hx       Current Medications:     Current Outpatient Medications   Medication Sig Dispense Refill   • cholecalciferol (VITAMIN D3) 1,000 units tablet Take 1,000 Units by mouth daily     • ferrous sulfate 325 (65 Fe) mg tablet Take 325 mg by mouth daily at bedtime     • Fiber Complete TABS Take 2 tablets by mouth daily     • metoclopramide (Reglan) 10 mg tablet Take 1 tablet (10 mg total) by mouth 2 (two) times a day as needed (migraine) 25 tablet 1   • ondansetron (Zofran ODT) 4 mg disintegrating tablet Take 1 tablet (4 mg total) by mouth every 6 (six) hours as needed for nausea or vomiting 20 tablet 0   • pyridostigmine (MESTINON) 60 mg tablet Take 0.5 tablets (30 mg total) by mouth 4 (four) times a day 180 tablet 3   • rizatriptan (Maxalt-MLT) 10 mg disintegrating tablet Take 1 tablet (10 mg total) by mouth as needed for migraine Take at the onset of migraine; if symptoms continue or return, may take another dose at least 2 hours after first dose. Take no more than 2 doses in a day. 18 tablet 1   • topiramate (TOPAMAX) 25 mg tablet 2 tab po qhs 180 tablet 3     No current facility-administered medications for this visit.      Allergies:     Allergies   Allergen Reactions   • Augmentin [Amoxicillin-Pot Clavulanate] Diarrhea   • Fish-Derived Products - Food Allergy Vomiting      Physical Exam:     /80   Pulse 75   Temp 98.3 °F (36.8 °C) (Tympanic)   Resp 16   Ht 5' 7\" (1.702 m)   Wt 86.2 kg (190 lb)   SpO2 99%   BMI 29.76 kg/m²     Physical Exam  Constitutional:       General: She is not in acute distress.     Appearance: She is well-developed. She is not toxic-appearing.   HENT:      Head: Normocephalic.      Right Ear: There is impacted cerumen.      Left Ear: There is " impacted cerumen.      Mouth/Throat:      Mouth: Mucous membranes are moist.      Pharynx: No oropharyngeal exudate.   Eyes:      Conjunctiva/sclera: Conjunctivae normal.   Cardiovascular:      Rate and Rhythm: Normal rate and regular rhythm.   Pulmonary:      Effort: Pulmonary effort is normal. No respiratory distress.      Breath sounds: No wheezing or rales.   Abdominal:      General: Bowel sounds are normal.      Palpations: Abdomen is soft.      Tenderness: There is no abdominal tenderness.   Musculoskeletal:      Cervical back: Neck supple.   Skin:     General: Skin is warm and dry.      Findings: No rash.   Neurological:      Mental Status: She is alert and oriented to person, place, and time.      Cranial Nerves: No cranial nerve deficit.      Motor: No weakness.      Gait: Gait normal.   Psychiatric:         Mood and Affect: Mood normal.         Thought Content: Thought content normal.          Christiano Gibson MD   [facility] [address]

## 2024-04-24 NOTE — ASSESSMENT & PLAN NOTE
She has menorrhagia at times.  Denies signs of GI bleeding.  She is on iron sulfate supplements  I will check her hemoglobin and iron saturation

## 2024-05-07 ENCOUNTER — APPOINTMENT (OUTPATIENT)
Dept: LAB | Facility: HOSPITAL | Age: 24
End: 2024-05-07
Payer: COMMERCIAL

## 2024-05-07 DIAGNOSIS — E04.1 THYROID NODULE: ICD-10-CM

## 2024-05-07 DIAGNOSIS — E23.7 PITUITARY ABNORMALITY (HCC): ICD-10-CM

## 2024-05-07 DIAGNOSIS — D50.9 IRON DEFICIENCY ANEMIA, UNSPECIFIED IRON DEFICIENCY ANEMIA TYPE: ICD-10-CM

## 2024-05-07 LAB
ERYTHROCYTE [DISTWIDTH] IN BLOOD BY AUTOMATED COUNT: 13.2 % (ref 11.6–15.1)
FERRITIN SERPL-MCNC: 9 NG/ML (ref 11–307)
HCT VFR BLD AUTO: 38.1 % (ref 34.8–46.1)
HGB BLD-MCNC: 11.7 G/DL (ref 11.5–15.4)
IRON SATN MFR SERPL: 11 % (ref 15–50)
IRON SERPL-MCNC: 43 UG/DL (ref 50–212)
MCH RBC QN AUTO: 26 PG (ref 26.8–34.3)
MCHC RBC AUTO-ENTMCNC: 30.7 G/DL (ref 31.4–37.4)
MCV RBC AUTO: 85 FL (ref 82–98)
PLATELET # BLD AUTO: 292 THOUSANDS/UL (ref 149–390)
PMV BLD AUTO: 10.6 FL (ref 8.9–12.7)
RBC # BLD AUTO: 4.5 MILLION/UL (ref 3.81–5.12)
TIBC SERPL-MCNC: 406 UG/DL (ref 250–450)
TSH SERPL DL<=0.05 MIU/L-ACNC: 1.51 UIU/ML (ref 0.45–4.5)
UIBC SERPL-MCNC: 363 UG/DL (ref 155–355)
WBC # BLD AUTO: 5.4 THOUSAND/UL (ref 4.31–10.16)

## 2024-05-07 PROCEDURE — 83550 IRON BINDING TEST: CPT

## 2024-05-07 PROCEDURE — 36415 COLL VENOUS BLD VENIPUNCTURE: CPT

## 2024-05-07 PROCEDURE — 83540 ASSAY OF IRON: CPT

## 2024-05-07 PROCEDURE — 85027 COMPLETE CBC AUTOMATED: CPT

## 2024-05-07 PROCEDURE — 84443 ASSAY THYROID STIM HORMONE: CPT

## 2024-05-07 PROCEDURE — 82728 ASSAY OF FERRITIN: CPT

## 2024-06-27 ENCOUNTER — TELEPHONE (OUTPATIENT)
Dept: FAMILY MEDICINE CLINIC | Facility: HOSPITAL | Age: 24
End: 2024-06-27

## 2024-06-27 NOTE — TELEPHONE ENCOUNTER
Mess to pt. She will sched.     ----- Message from Christiano Gibson MD sent at 6/26/2024  5:29 PM EDT -----  Please call pt to remind her that she is due to have a repeat thyroid ultrasound for follow-up of a thyroid nodule seen in 2023. My order is still in epic and valid, she just needs to call to schedule appointment!

## 2024-07-17 ENCOUNTER — TELEPHONE (OUTPATIENT)
Dept: NEUROLOGY | Facility: CLINIC | Age: 24
End: 2024-07-17

## 2024-07-17 NOTE — TELEPHONE ENCOUNTER
Made an appt reminder call no answer lmom. Appt with dr nicholas on 7/24/24  @11:00am  in [facility].

## 2024-07-24 ENCOUNTER — OFFICE VISIT (OUTPATIENT)
Dept: NEUROLOGY | Facility: CLINIC | Age: 24
End: 2024-07-24
Payer: COMMERCIAL

## 2024-07-24 VITALS
HEIGHT: 67 IN | WEIGHT: 191 LBS | BODY MASS INDEX: 29.98 KG/M2 | DIASTOLIC BLOOD PRESSURE: 60 MMHG | RESPIRATION RATE: 14 BRPM | SYSTOLIC BLOOD PRESSURE: 112 MMHG | OXYGEN SATURATION: 98 % | HEART RATE: 72 BPM | TEMPERATURE: 98 F

## 2024-07-24 DIAGNOSIS — G70.00 MYASTHENIA GRAVIS (HCC): ICD-10-CM

## 2024-07-24 DIAGNOSIS — G43.009 MIGRAINE WITHOUT AURA AND WITHOUT STATUS MIGRAINOSUS, NOT INTRACTABLE: ICD-10-CM

## 2024-07-24 DIAGNOSIS — R51.9 FREQUENT HEADACHES: ICD-10-CM

## 2024-07-24 PROCEDURE — 99213 OFFICE O/P EST LOW 20 MIN: CPT | Performed by: PSYCHIATRY & NEUROLOGY

## 2024-07-24 RX ORDER — METOCLOPRAMIDE 10 MG/1
10 TABLET ORAL 2 TIMES DAILY PRN
Qty: 25 TABLET | Refills: 1 | Status: SHIPPED | OUTPATIENT
Start: 2024-07-24

## 2024-07-24 RX ORDER — PYRIDOSTIGMINE BROMIDE 60 MG/1
30 TABLET ORAL 3 TIMES DAILY
Qty: 135 TABLET | Refills: 3 | Status: SHIPPED | OUTPATIENT
Start: 2024-07-24

## 2024-07-24 RX ORDER — RIZATRIPTAN BENZOATE 10 MG/1
10 TABLET, ORALLY DISINTEGRATING ORAL AS NEEDED
Qty: 18 TABLET | Refills: 1 | Status: SHIPPED | OUTPATIENT
Start: 2024-07-24

## 2024-07-24 NOTE — PROGRESS NOTES
Patient ID: Sunita Carranza is a 24 y.o. female.    Assessment/Plan:    Myasthenia gravis (HCC)  Sunita is a 24-year-old  who has antibody positive myasthenia gravis.  She underwent thymectomy in 2022 and is currently utilizing Mestinon's 30 mg up to twice a day.  Overall she is doing well.  She will utilize Mestinon if she is active.  We will continue her on this current regimen up to 3 times a day.  She does deny any side effects from the Mestinon.  Today's MG ADL score was 0    Migraine without aura and without status migrainosus, not intractable  Sunita also has a history of migraine headaches and is currently doing well on Topamax 50 mg a day.  Higher doses did result in more dysesthesias.  I did refill her Maxalt MLT and Reglan.       Diagnoses and all orders for this visit:    Migraine without aura and without status migrainosus, not intractable  -     rizatriptan (Maxalt-MLT) 10 mg disintegrating tablet; Take 1 tablet (10 mg total) by mouth as needed for migraine Take at the onset of migraine; if symptoms continue or return, may take another dose at least 2 hours after first dose. Take no more than 2 doses in a day.  -     metoclopramide (Reglan) 10 mg tablet; Take 1 tablet (10 mg total) by mouth 2 (two) times a day as needed (migraine)    Frequent headaches  -     metoclopramide (Reglan) 10 mg tablet; Take 1 tablet (10 mg total) by mouth 2 (two) times a day as needed (migraine)    Myasthenia gravis (HCC)  -     pyridostigmine (MESTINON) 60 mg tablet; Take 0.5 tablets (30 mg total) by mouth 3 (three) times a day 90 day supply       Subjective:    Sunita Carranza is a 24 y.o. female who presents today for neurologic follow up.    She underwent a robotic assisted thymectomy in December 2022  she was last evaluated here several months ago.  Overall she does report improvement.  She does have days that are more severe characterized by achiness and muscle tenderness.  She denies any double vision.  She  does utilize Mestinon 30 mg up to twice a day.  Was last evaluated here several months ago and continues to do well.  Today's MG ADL score was 0.         She does have a history of migraine headaches which she utilizes Topamax 50 mg a day for prevention.  Overall her migraine headaches were well-controlled .  There is no contraindication however Topamax can be less effective with birth control pills.  Have an IUD in place.      Additional workup was ordered at time of her visit in August 2022:  --Labs 8/17/22:  Acetylcholine receptor binding >80.00, acetylcholine receptor blocking 49%, MUSK ab negative.  B12 516, vit D 15.5, negative EDUARDO, RF, Sjogren's.  Sed rate nl 13, copper slightly low 77, normal magnesium 2.2.  --EMG RUE/RLE 9/15/22 was normal  --CT chest w contrast 9/13/22 residual thymic tissue noted to be somewhat full for patient's age and suggest thymic hyperplasia.  No findings suspicious for thymoma.  Incidental thyroid nodule, incidental 1.9cm cyst at the level of the suprasternal notch just left of midline.       --MRI t-spine 9/14/22 normal cord signal. Incidentally seen 1.3cm right thyroid nodule.  Redemonstrated left infra thyroidal soft tissue lesion at the level of the suprasternal notch may represent benign lymph node          MG dx in august 2022  MG adl score 03/29/23 1 / 24 12/06/23 1/24 . 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                                The following portions of the patient's history were reviewed and updated as appropriate: She  has a past medical history of COVID (08/2022), Depression, Head injury (2018), Headache, tension-type (2012), History of migraine, Low vitamin D level, Migraine, Myasthenia gravis (HCC) (08/2022), and Uses birth control.  She  has a past surgical history that  includes Tonsillectomy and adenoidectomy; ADENOIDECTOMY; pr thymectomy prtl/tot transcervical appr spx (Left, 12/16/2022); pr brnchsc incl fluor gdnce dx w/cell washg spx (N/A, 12/16/2022); and Total thymectomy (Left, 12/16/2022).  Her family history includes Aneurysm in her paternal grandfather; Breast cancer in her paternal grandmother; Diabetes in her father; Hypertension in her father and maternal grandmother; Migraines in her mother; Stroke in her paternal aunt.  She  reports that she has never smoked. She has never used smokeless tobacco. She reports current alcohol use of about 2.0 standard drinks of alcohol per week. She reports that she does not use drugs.  Current Outpatient Medications   Medication Sig Dispense Refill    cholecalciferol (VITAMIN D3) 1,000 units tablet Take 1,000 Units by mouth daily      ferrous sulfate 325 (65 Fe) mg tablet Take 325 mg by mouth daily at bedtime      Fiber Complete TABS Take 2 tablets by mouth daily      metoclopramide (Reglan) 10 mg tablet Take 1 tablet (10 mg total) by mouth 2 (two) times a day as needed (migraine) 25 tablet 1    ondansetron (Zofran ODT) 4 mg disintegrating tablet Take 1 tablet (4 mg total) by mouth every 6 (six) hours as needed for nausea or vomiting 20 tablet 0    pyridostigmine (MESTINON) 60 mg tablet Take 0.5 tablets (30 mg total) by mouth 3 (three) times a day 90 day supply 135 tablet 3    rizatriptan (Maxalt-MLT) 10 mg disintegrating tablet Take 1 tablet (10 mg total) by mouth as needed for migraine Take at the onset of migraine; if symptoms continue or return, may take another dose at least 2 hours after first dose. Take no more than 2 doses in a day. 18 tablet 1    topiramate (TOPAMAX) 25 mg tablet 2 tab po qhs 180 tablet 3     No current facility-administered medications for this visit.     She is allergic to augmentin [amoxicillin-pot clavulanate] and fish-derived products - food allergy..         Objective:    Blood pressure 112/60, pulse 72,  "temperature 98 °F (36.7 °C), temperature source Temporal, resp. rate 14, height 5' 7\" (1.702 m), weight 86.6 kg (191 lb), SpO2 98%, not currently breastfeeding.    Physical Exam  Eyes:      General: Lids are normal.      Extraocular Movements: Extraocular movements intact.      Pupils: Pupils are equal, round, and reactive to light.   Neurological:      Motor: Motor strength is normal.     Deep Tendon Reflexes:      Reflex Scores:       Patellar reflexes are 2+ on the right side and 2+ on the left side.       Achilles reflexes are 1+ on the right side and 1+ on the left side.        Neurological Exam  Mental Status  Awake, alert and oriented to person, place and time. Oriented to person, place and time. Language is fluent with no aphasia.    Cranial Nerves  CN II: Visual acuity is normal. Visual fields full to confrontation.  CN III, IV, VI: Extraocular movements intact bilaterally. Normal lids and orbits bilaterally. Pupils equal round and reactive to light bilaterally.  CN V: Facial sensation is normal.  CN VII: Full and symmetric facial movement.  CN VIII: Hearing is normal.  CN IX, X: Palate elevates symmetrically. Normal gag reflex.  CN XI: Shoulder shrug strength is normal.  CN XII: Tongue midline without atrophy or fasciculations.    Motor  Normal muscle bulk throughout. Strength is 5/5 throughout all four extremities.  No fatigable weakness..    Sensory  Light touch is normal in upper and lower extremities. Temperature is normal in upper and lower extremities.     Reflexes                                            Right                      Left  Patellar                                2+                         2+  Achilles                                1+                         1+  Right Plantar: downgoing  Left Plantar: downgoing    Coordination  Right: Finger-to-nose normal.Left: Finger-to-nose normal.    Gait   Able to rise from chair without using arms.    ROS  obtained from the medical assistant as " below    ROS:    Review of Systems   Constitutional:  Negative for appetite change, fatigue and fever.   HENT: Negative.  Negative for hearing loss, tinnitus, trouble swallowing and voice change.    Eyes: Negative.  Negative for photophobia, pain and visual disturbance.   Respiratory: Negative.  Negative for shortness of breath.    Cardiovascular: Negative.  Negative for palpitations.   Gastrointestinal: Negative.  Negative for nausea and vomiting.   Endocrine: Negative.  Negative for cold intolerance.   Genitourinary: Negative.  Negative for dysuria, frequency and urgency.   Musculoskeletal:  Negative for back pain, gait problem, myalgias, neck pain and neck stiffness.   Skin: Negative.  Negative for rash.   Allergic/Immunologic: Negative.    Neurological: Negative.  Negative for dizziness, tremors, seizures, syncope, facial asymmetry, speech difficulty, weakness, light-headedness, numbness and headaches.   Hematological: Negative.  Does not bruise/bleed easily.   Psychiatric/Behavioral: Negative.  Negative for confusion, hallucinations and sleep disturbance.    All other systems reviewed and are negative.        I have spent a total time of 23 minutes in caring for this patient on the day of the visit/encounter including Counseling / Coordination of care, Documenting in the medical record, Reviewing / ordering tests, medicine, procedures  , and Obtaining or reviewing history  .     She can return in one year

## 2024-07-24 NOTE — ASSESSMENT & PLAN NOTE
Sunita is a 24-year-old  who has antibody positive myasthenia gravis.  She underwent thymectomy in 2022 and is currently utilizing Mestinon's 30 mg up to twice a day.  Overall she is doing well.  She will utilize Mestinon if she is active.  We will continue her on this current regimen up to 3 times a day.  She does deny any side effects from the Mestinon.  Today's MG ADL score was 0

## 2024-07-29 ENCOUNTER — CLINICAL SUPPORT (OUTPATIENT)
Dept: FAMILY MEDICINE CLINIC | Facility: HOSPITAL | Age: 24
End: 2024-07-29
Payer: COMMERCIAL

## 2024-07-29 DIAGNOSIS — Z11.1 TUBERCULIN SKIN TEST ENCOUNTER: Primary | ICD-10-CM

## 2024-07-29 PROCEDURE — 86580 TB INTRADERMAL TEST: CPT

## 2024-07-31 ENCOUNTER — CLINICAL SUPPORT (OUTPATIENT)
Dept: FAMILY MEDICINE CLINIC | Facility: HOSPITAL | Age: 24
End: 2024-07-31

## 2024-07-31 DIAGNOSIS — Z11.1 ENCOUNTER FOR PPD TEST: Primary | ICD-10-CM

## 2024-07-31 LAB
INDURATION: 0 MM
TB SKIN TEST: NEGATIVE

## 2024-08-06 ENCOUNTER — CLINICAL SUPPORT (OUTPATIENT)
Dept: NUTRITION | Facility: HOSPITAL | Age: 24
End: 2024-08-06
Attending: INTERNAL MEDICINE
Payer: COMMERCIAL

## 2024-08-06 VITALS — WEIGHT: 191.3 LBS | HEIGHT: 67 IN | BODY MASS INDEX: 30.02 KG/M2

## 2024-08-06 DIAGNOSIS — E66.3 OVERWEIGHT (BMI 25.0-29.9): ICD-10-CM

## 2024-08-06 PROCEDURE — 97802 MEDICAL NUTRITION INDIV IN: CPT

## 2024-08-06 NOTE — PROGRESS NOTES
Nutrition Assessment Form    Patient Name: Sunita Carranza    YOB: 2000    Sex: Female     Assessment Date: 8/6/2024  Start Time: 11:45am Stop Time: 12:45am Total Minutes: 60 mins     Data:  Present at session: self   Parent/Patient Concerns/reason for visit: Struggled with diagnosis of reed gravis, has had treatment and getting better, gained 50lbs since 2022 and would like to lose weight   Medical Dx/Reason for Referral: E66.3 overweight   Past Medical History:   Diagnosis Date    COVID 08/2022    Cold sx    Depression     Head injury 2018    concussion for 1 week    Headache, tension-type 2012    History of migraine     Low vitamin D level     Migraine     Myasthenia gravis (HCC) 08/2022    Uses birth control        Current Outpatient Medications   Medication Sig Dispense Refill    cholecalciferol (VITAMIN D3) 1,000 units tablet Take 1,000 Units by mouth daily      ferrous sulfate 325 (65 Fe) mg tablet Take 325 mg by mouth daily at bedtime      Fiber Complete TABS Take 2 tablets by mouth daily      metoclopramide (Reglan) 10 mg tablet Take 1 tablet (10 mg total) by mouth 2 (two) times a day as needed (migraine) 25 tablet 1    ondansetron (Zofran ODT) 4 mg disintegrating tablet Take 1 tablet (4 mg total) by mouth every 6 (six) hours as needed for nausea or vomiting 20 tablet 0    pyridostigmine (MESTINON) 60 mg tablet Take 0.5 tablets (30 mg total) by mouth 3 (three) times a day 90 day supply 135 tablet 3    rizatriptan (Maxalt-MLT) 10 mg disintegrating tablet Take 1 tablet (10 mg total) by mouth as needed for migraine Take at the onset of migraine; if symptoms continue or return, may take another dose at least 2 hours after first dose. Take no more than 2 doses in a day. 18 tablet 1    topiramate (TOPAMAX) 25 mg tablet 2 tab po qhs 180 tablet 3     No current facility-administered medications for this visit.        Additional Meds/Supplements:    Special Learning Needs/barriers to learning/any  "new barriers    Height: Ht Readings from Last 5 Encounters:   08/06/24 5' 7\" (1.702 m)   07/24/24 5' 7\" (1.702 m)   04/24/24 5' 7\" (1.702 m)   02/15/24 5' 7\" (1.702 m)   12/26/23 5' 7\" (1.702 m)      Weight: Wt Readings from Last 10 Encounters:   08/06/24 86.8 kg (191 lb 4.8 oz)   07/24/24 86.6 kg (191 lb)   04/24/24 86.2 kg (190 lb)   02/15/24 88.6 kg (195 lb 6.4 oz)   12/26/23 89.5 kg (197 lb 6.4 oz)   12/06/23 86.7 kg (191 lb 3.2 oz)   10/24/23 85.3 kg (188 lb)   04/20/23 88.5 kg (195 lb)   03/29/23 88.5 kg (195 lb)   01/27/23 86.4 kg (190 lb 7.6 oz)     Estimated body mass index is 29.96 kg/m² as calculated from the following:    Height as of this encounter: 5' 7\" (1.702 m).    Weight as of this encounter: 86.8 kg (191 lb 4.8 oz).   Recent Weight Change: []Yes     []No  Amount: No significant weight changes      Energy Needs: [place] Equation: x1.4 minus 500-3498zge=1306-2451mcn, 74-92g 1.2-1.5g/kg ideal wt. 61.4kg, 1810mL 1mL/kevin    Allergies   Allergen Reactions    Augmentin [Amoxicillin-Pot Clavulanate] Diarrhea    Fish-Derived Products - Food Allergy Vomiting    or intolerances    Social History     Substance and Sexual Activity   Alcohol Use Yes    Alcohol/week: 2.0 standard drinks of alcohol    Types: 2 Standard drinks or equivalent per week    Comment: social    _______x/wk or month  1 or 2 or 3 or 4 or____ drinks/session   Mixed drinks/ wine/ beer    Socially drinks   Social History     Tobacco Use   Smoking Status Never   Smokeless Tobacco Never       Who shops? mother and father   Who cooks/cooking methods/Eating out/take out habits   mother  Cooking methods: bake/spence/air spence/grill/boil/other________    Take out: _5__ x/wk   Dining out _1___ x month   Exercise: Walk/ run/ bike/ gym/elliptical/other _________  ____ x/wk how many minutes:______   strength training    Golf 1 x week, not consistent but goes hiking,walking, yoga   Other: ie: Sleep habits/ stress level/ work habits household-lives " with ?/ food security Gets about 7-8hrs of sleep   Prior Nutritional Counseling? []Yes     [x]No  When:      Why:         Diet Hx:  Breakfast: Sunita is a vegetarian, Coffee with skim milk, sometimes bagel with cream cheese or adds an egg   Lunch:   Greek Yogurt, baby bell cheese, pb&j sandwich or left overs, drinks water, black coffee, diet soda       Dinner:   Eggplant parm, spaghetti squash, stir spence with tofu, or has TWILA mixed veggie hoagie, kale and quinoa salad, burrito with rice and beans and veggies, or egg sandwich or macroni and cheese, cabbage and noodles    Drinks water, diet soda   Snacks: AM - none  PM - yogurt  HS - sugar snap peas, plantain chips   Other Notes/ Initial Assessment:  Sunita present for nutrition counseling. Sunita reported in 2022 was diagnosed with reed gravis and had gained 50lbs from limited activity and feeling depressed. Sunita is a vegetarian, but will eat shell fish and dairy, no white fish as she is allergic. Currently reed gravis is under control and able to participate in more physical activity. Sunita is goal is to lose weight.  Discussed balancing meals, portion control, and the importance of physical activity for overall health. Also reviewed nutrition therapy for iron and vitamin D deficiency.     Updated assessment (Follow up note only):           Nutrition Diagnosis:   Food and nutrition related knowledge deficit  related to Lack of prior exposure to accurate nutrition related information as evidenced by No prior knowledge of need for food and nutrition related recommendations       Any change or new dx since previous visit:     Nutrition Diagnosis:         Medical Nutrition Therapy Intervention:  [x]Individualized Meal Plan- Discussed the importance of eating 3 meals daily and not skipping, and how metabolism is affected.  Also discussed adding in small snacks or 5-6 small meals daily if desired vs 3 larger ones, and appropriate options and portions.   Appropriate timing of meals, including eating within 1 hr of waking and eating meals slowly 20mins/meals, minimizing mindless/boredom or habitual eating, etc was also mentioned. Discussed the plate method of portioning foods, including half a plate fruits and vegetables or a half plate all vegetables, 1/4 of the plate a lean protein source or meat, and a 1/4 of the plate being a whole grain carb- usually 1/2-1c.  This should be followed for at least 2 meals of the day, but could also be followed for all 3. Reviewed Vegetarian/General Healthy diet information, 1500cal 5-Day Menus, Iron Deficiency Nutrition therapy written material and High Vitamin D List Foods. [x]Understanding Lab Values- 5/7/24 iron 43, 4/29/23 HDL 48, vit D 14.4   []Basic Pathophysiology of Disease []Food/Medication Interactions   [x]Food Diary- Discussed using calorie tracker applications such as MyFindersfeePal, Lose IT, or Cronometer to help monitor nutrient intake to assist with weight management.  [x]Exercise- 150 mins of moderate activity weekly, discussed importance of variety of activity, including wt bearing activities 2-3x/wk x 10-15mins. Discussed importance of activity throughout the lifecycle and its impact on overall health/stress management, etc.    []Lifestyle/Behavior Modification Techniques []Medication, Mechanism of Action   []Label Reading: CHO/ Na/ Fat/ other_________ []Self Blood Glucose Monitoring   [x]Weight/BMI Goals: gain/lose/maintain- Short term goal of 2-4# x 1 month or next visit  []Other -           Comprehension: []Excellent  []Very Good  [x]Good  []Fair   []Poor    Receptivity: []Excellent  []Very Good  [x]Good  []Fair   []Poor    Expected Compliance: []Excellent  []Very Good  [x]Good  []Fair   []Poor        Goals (initial)/ Progress made on previous goals/new goals:  Avoid skipping breakfast consume breakfast within 1hr of waking by f/u   2.  Consume adequate protein intake goal is 75g per day by f/u   3. Limit  "saturated fat from butter, cream cheese, sour cream, cheeses by f/u  4. Stay within 1300-1800cal using calorie tracker dorita by f/u  5. Incorporate weight bearing excercises 15 x mins 2-3x week by f/u       No follow-ups on file.  Labs:  CMP  Lab Results   Component Value Date    K 3.6 04/29/2023     04/29/2023    CO2 24 04/29/2023    BUN 14 04/29/2023    CREATININE 0.65 04/29/2023    GLUF 75 04/29/2023    CALCIUM 9.2 04/29/2023    AST 11 (L) 04/29/2023    ALT 10 04/29/2023    ALKPHOS 70 04/29/2023    EGFR 125 04/29/2023       BMP  Lab Results   Component Value Date    CALCIUM 9.2 04/29/2023    K 3.6 04/29/2023    CO2 24 04/29/2023     04/29/2023    BUN 14 04/29/2023    CREATININE 0.65 04/29/2023       Lipids  No results found for: \"CHOL\"  Lab Results   Component Value Date    HDL 48 (L) 04/29/2023    HDL 57 06/06/2022     Lab Results   Component Value Date    LDLCALC 89 04/29/2023    LDLCALC 119 (H) 06/06/2022     Lab Results   Component Value Date    TRIG 53 04/29/2023    TRIG 36 06/06/2022     No results found for: \"CHOLHDL\"    Hemoglobin A1C  No results found for: \"HGBA1C\"    Fasting Glucose  Lab Results   Component Value Date    GLUF 75 04/29/2023       Insulin     Thyroid  No results found for: \"TSH\", \"P9AEOEG\", \"U6OMKMF\", \"THYROIDAB\"    Hepatic Function Panel  Lab Results   Component Value Date    ALT 10 04/29/2023    AST 11 (L) 04/29/2023    ALKPHOS 70 04/29/2023       Celiac Disease Antibody Panel  No results found for: \"ENDOMYSIAL IGA\", \"GLIADIN IGA\", \"GLIADIN IGG\", \"IGA\", \"TISSUE TRANSGLUT AB\", \"TTG IGA\"   Iron  Lab Results   Component Value Date    IRON 43 (L) 05/07/2024    TIBC 406 05/07/2024    FERRITIN 9 (L) 05/07/2024            Mickey Darby RD  [facility]  [address] [place] [id number]    "

## 2025-04-28 ENCOUNTER — OFFICE VISIT (OUTPATIENT)
Dept: FAMILY MEDICINE CLINIC | Facility: HOSPITAL | Age: 25
End: 2025-04-28
Payer: COMMERCIAL

## 2025-04-28 VITALS
WEIGHT: 192 LBS | RESPIRATION RATE: 16 BRPM | BODY MASS INDEX: 30.13 KG/M2 | SYSTOLIC BLOOD PRESSURE: 130 MMHG | DIASTOLIC BLOOD PRESSURE: 90 MMHG | OXYGEN SATURATION: 96 % | HEART RATE: 62 BPM | HEIGHT: 67 IN | TEMPERATURE: 98.7 F

## 2025-04-28 DIAGNOSIS — D50.9 IRON DEFICIENCY ANEMIA, UNSPECIFIED IRON DEFICIENCY ANEMIA TYPE: ICD-10-CM

## 2025-04-28 DIAGNOSIS — Z00.00 ANNUAL PHYSICAL EXAM: ICD-10-CM

## 2025-04-28 DIAGNOSIS — E23.7 PITUITARY ABNORMALITY (HCC): ICD-10-CM

## 2025-04-28 DIAGNOSIS — E04.1 THYROID NODULE: ICD-10-CM

## 2025-04-28 DIAGNOSIS — G43.009 MIGRAINE WITHOUT AURA AND WITHOUT STATUS MIGRAINOSUS, NOT INTRACTABLE: Primary | ICD-10-CM

## 2025-04-28 DIAGNOSIS — Z13.1 SCREENING FOR DIABETES MELLITUS: ICD-10-CM

## 2025-04-28 DIAGNOSIS — Z13.6 SCREENING FOR CARDIOVASCULAR CONDITION: ICD-10-CM

## 2025-04-28 DIAGNOSIS — G70.00 MYASTHENIA GRAVIS (HCC): ICD-10-CM

## 2025-04-28 PROBLEM — E66.3 OVERWEIGHT (BMI 25.0-29.9): Status: RESOLVED | Noted: 2024-04-24 | Resolved: 2025-04-28

## 2025-04-28 PROCEDURE — 99395 PREV VISIT EST AGE 18-39: CPT | Performed by: INTERNAL MEDICINE

## 2025-04-28 PROCEDURE — 99213 OFFICE O/P EST LOW 20 MIN: CPT | Performed by: INTERNAL MEDICINE

## 2025-04-28 RX ORDER — METHOCARBAMOL 500 MG/1
500 TABLET, FILM COATED ORAL 2 TIMES DAILY PRN
Qty: 30 TABLET | Refills: 1 | Status: SHIPPED | OUTPATIENT
Start: 2025-04-28

## 2025-04-28 NOTE — ASSESSMENT & PLAN NOTE
She had iron deficiency last May.  She has an IUD and she has no menorrhagia.  Denies signs of GI bleeding.  I will check her hemoglobin and iron levels  Orders:  •  CBC and Platelet; Future  •  Iron Panel (Includes Ferritin, Iron Sat%, Iron, and TIBC); Future

## 2025-04-28 NOTE — ASSESSMENT & PLAN NOTE
She has history of myasthenia gravis manifesting as diplopia, proptosis, the hands getting tired when typing.  Her symptoms are much better since thymectomy.  She tolerates pyridostigmine without nausea, vomiting, diarrhea or abdominal cramps.  Continue Mestinon

## 2025-04-28 NOTE — PROGRESS NOTES
Adult Annual Physical  Name: Sunita Carranza      : 2000      MRN: [id number]  Encounter Provider: Christiano Gibson MD  Encounter Date: 2025   Encounter department: [facility] [address]    :  Assessment & Plan  Migraine without aura and without status migrainosus, not intractable  Patient has migraine headaches but in addition to migraine headaches she also has 1-2 episodes every couple of weeks of headaches that start with a tense sensation in the muscles and then shoulders and the neck then it may last for 12 to 36 hours.  Taking an ibuprofen, stretching, laying down and dark room may help the headaches.  She also has mild nausea sometimes but no vomiting and has photophobia.  I am not sure if these are migraine headaches or a combination of my headaches and tension headaches.  She used to take muscle relaxer before that also helped relieve the tension in her muscles thereby alleviating the headache.  I prescribed her Robaxin that she will take sparingly.  I advised her to do the stretching exercises first before taking this.    Will continue Maxalt and Topamax for migraine headaches  Orders:  •  methocarbamol (ROBAXIN) 500 mg tablet; Take 1 tablet (500 mg total) by mouth 2 (two) times a day as needed for muscle spasms    Myasthenia gravis (HCC)  She has history of myasthenia gravis manifesting as diplopia, proptosis, the hands getting tired when typing.  Her symptoms are much better since thymectomy.  She tolerates pyridostigmine without nausea, vomiting, diarrhea or abdominal cramps.  Continue Mestinon       Pituitary abnormality (HCC)  She had a stable enlarged pituitary gland demonstrating homogeneous enhancement on review MRI of the brain in .  She denies change in vision or change in headaches.  Her hormonal workup in 2020 showed no hormone production.  I will check a prolactin level  Orders:  •  Prolactin; Future    Iron deficiency anemia, unspecified iron  deficiency anemia type    She had iron deficiency last May.  She has an IUD and she has no menorrhagia.  Denies signs of GI bleeding.  I will check her hemoglobin and iron levels  Orders:  •  CBC and Platelet; Future  •  Iron Panel (Includes Ferritin, Iron Sat%, Iron, and TIBC); Future    Thyroid nodule  Thyroid ultrasound showed a right thyroid gland thyroid nodule in 2023.  Patient denies dysphagia, hoarseness, cough, shortness of breath.  I felt no thyromegaly.  I will recheck her TSH and I encouraged her to get the thyroid ultrasound done that I ordered last year.  The order is still valid.  Orders:  •  TSH, 3rd generation with Free T4 reflex; Future    Annual physical exam         Screening for diabetes mellitus    Orders:  •  Comprehensive metabolic panel; Future  •  Hemoglobin A1C; Future    Screening for cardiovascular condition    Orders:  •  Lipid panel; Future    Migraine without aura and without status migrainosus, not intractable         Myasthenia gravis (HCC)         Iron deficiency anemia, unspecified iron deficiency anemia type           Annual physical exam         Screening for diabetes mellitus         Screening for cardiovascular condition               Preventive Screenings:  - Diabetes Screening: risks/benefits discussed and orders placed  - Cholesterol Screening: risks/benefits discussed and orders placed   - Cervical cancer screening: screening up-to-date and risks/benefits discussed   - Lung cancer screening: screening not indicated     Immunizations:  - Immunizations due: Influenza and Tdap  - Risks/benefits immunizations discussed      Counseling/Anticipatory Guidance:    - Diet: discussed recommendations for a healthy/well-balanced diet.   - Exercise: the importance of regular exercise/physical activity was discussed. Recommend exercise 3-5 times per week for at least 30 minutes.       Depression Screening and Follow-up Plan: Patient was screened for depression during today's encounter.  "They screened negative with a PHQ-2 score of 0.          History of Present Illness     Adult Annual Physical:  Patient presents for annual physical.     Diet and Physical Activity:  - Diet/Nutrition: well balanced diet, limited junk food, low calorie diet, consuming 3-5 servings of fruits/vegetables daily, adequate fiber intake and adequate whole grain intake.  - Exercise: vigorous cardiovascular exercise, strength training exercises, 5-7 times a week on average and 30-60 minutes on average.    Depression Screening:  - PHQ-2 Score: 0    General Health:  - Sleep: 4-6 hours of sleep on average.  - Hearing: normal hearing bilateral ears.  - Vision: no vision problems and most recent eye exam > 1 year ago.  - Dental: regular dental visits.    /GYN Health:  - Follows with GYN: yes.   - Menopause: premenopausal.   - History of STDs: no  - Contraception: barrier methods and IUD placement.      Advanced Care Planning:  - Has an advanced directive?: no    - Has a durable medical POA?: no      Review of Systems   HENT:  Negative for hearing loss.    Eyes:  Negative for visual disturbance.   Respiratory:  Negative for shortness of breath.    Cardiovascular:  Negative for chest pain.   Gastrointestinal:  Negative for abdominal pain, blood in stool and nausea.   Genitourinary:  Negative for dysuria.   Psychiatric/Behavioral:  Negative for dysphoric mood.          Objective   /90   Pulse 62   Temp 98.7 °F (37.1 °C) (Tympanic)   Resp 16   Ht 5' 7\" (1.702 m)   Wt 87.1 kg (192 lb)   SpO2 96%   BMI 30.07 kg/m²     Physical Exam  Constitutional:       General: She is not in acute distress.     Appearance: She is well-developed. She is not toxic-appearing.   HENT:      Head: Normocephalic.      Right Ear: Tympanic membrane normal.      Left Ear: Tympanic membrane normal.      Mouth/Throat:      Mouth: Mucous membranes are moist.      Pharynx: No oropharyngeal exudate.   Eyes:      Conjunctiva/sclera: Conjunctivae " normal.   Cardiovascular:      Rate and Rhythm: Normal rate and regular rhythm.      Heart sounds: No murmur heard.     No gallop.   Pulmonary:      Effort: Pulmonary effort is normal. No respiratory distress.      Breath sounds: No wheezing or rales.   Abdominal:      General: Bowel sounds are normal.      Palpations: Abdomen is soft.      Tenderness: There is no abdominal tenderness.   Musculoskeletal:      Cervical back: Neck supple.   Lymphadenopathy:      Cervical: No cervical adenopathy.   Skin:     General: Skin is warm and dry.   Neurological:      Mental Status: She is alert and oriented to person, place, and time.      Cranial Nerves: No cranial nerve deficit.      Motor: No weakness.      Gait: Gait normal.   Psychiatric:         Mood and Affect: Mood normal.

## 2025-04-28 NOTE — ASSESSMENT & PLAN NOTE
Thyroid ultrasound showed a right thyroid gland thyroid nodule in 2023.  Patient denies dysphagia, hoarseness, cough, shortness of breath.  I felt no thyromegaly.  I will recheck her TSH and I encouraged her to get the thyroid ultrasound done that I ordered last year.  The order is still valid.  Orders:  •  TSH, 3rd generation with Free T4 reflex; Future

## 2025-04-28 NOTE — ASSESSMENT & PLAN NOTE
She had a stable enlarged pituitary gland demonstrating homogeneous enhancement on review MRI of the brain in 2022.  She denies change in vision or change in headaches.  Her hormonal workup in 2020 2022 showed no hormone production.  I will check a prolactin level  Orders:  •  Prolactin; Future

## 2025-04-28 NOTE — ASSESSMENT & PLAN NOTE
Patient has migraine headaches but in addition to migraine headaches she also has 1-2 episodes every couple of weeks of headaches that start with a tense sensation in the muscles and then shoulders and the neck then it may last for 12 to 36 hours.  Taking an ibuprofen, stretching, laying down and dark room may help the headaches.  She also has mild nausea sometimes but no vomiting and has photophobia.  I am not sure if these are migraine headaches or a combination of my headaches and tension headaches.  She used to take muscle relaxer before that also helped relieve the tension in her muscles thereby alleviating the headache.  I prescribed her Robaxin that she will take sparingly.  I advised her to do the stretching exercises first before taking this.    Will continue Maxalt and Topamax for migraine headaches  Orders:  •  methocarbamol (ROBAXIN) 500 mg tablet; Take 1 tablet (500 mg total) by mouth 2 (two) times a day as needed for muscle spasms

## 2025-07-24 ENCOUNTER — OFFICE VISIT (OUTPATIENT)
Dept: NEUROLOGY | Facility: CLINIC | Age: 25
End: 2025-07-24
Payer: COMMERCIAL

## 2025-07-24 VITALS
HEIGHT: 67 IN | HEART RATE: 72 BPM | RESPIRATION RATE: 14 BRPM | DIASTOLIC BLOOD PRESSURE: 76 MMHG | BODY MASS INDEX: 29.26 KG/M2 | OXYGEN SATURATION: 97 % | TEMPERATURE: 98 F | WEIGHT: 186.4 LBS | SYSTOLIC BLOOD PRESSURE: 120 MMHG

## 2025-07-24 DIAGNOSIS — G70.00 MYASTHENIA GRAVIS (HCC): ICD-10-CM

## 2025-07-24 DIAGNOSIS — G43.009 MIGRAINE WITHOUT AURA AND WITHOUT STATUS MIGRAINOSUS, NOT INTRACTABLE: ICD-10-CM

## 2025-07-24 DIAGNOSIS — R51.9 FREQUENT HEADACHES: ICD-10-CM

## 2025-07-24 PROCEDURE — 99214 OFFICE O/P EST MOD 30 MIN: CPT | Performed by: PHYSICIAN ASSISTANT

## 2025-07-24 RX ORDER — PYRIDOSTIGMINE BROMIDE 60 MG/1
30 TABLET ORAL 3 TIMES DAILY
Qty: 135 TABLET | Refills: 3 | Status: SHIPPED | OUTPATIENT
Start: 2025-07-24

## 2025-07-24 RX ORDER — RIZATRIPTAN BENZOATE 10 MG/1
10 TABLET, ORALLY DISINTEGRATING ORAL AS NEEDED
Qty: 18 TABLET | Refills: 1 | Status: SHIPPED | OUTPATIENT
Start: 2025-07-24

## 2025-07-24 RX ORDER — METOCLOPRAMIDE 10 MG/1
10 TABLET ORAL 2 TIMES DAILY PRN
Qty: 25 TABLET | Refills: 1 | Status: SHIPPED | OUTPATIENT
Start: 2025-07-24

## 2025-07-24 RX ORDER — TOPIRAMATE 25 MG/1
TABLET, FILM COATED ORAL
Qty: 180 TABLET | Refills: 3 | Status: SHIPPED | OUTPATIENT
Start: 2025-07-24

## 2025-07-24 NOTE — ASSESSMENT & PLAN NOTE
Patient has antibody positive myasthenia gravis and underwent thymectomy in 2022 and is currently taking Mestinon 30 mg up to 3 times a day, which has controlled her symptoms.  She only occasionally notices eyelid drooping at times, no diplopia or other symptoms.  She has actually started running and has been doing great with this.  Today's MG ADL score was 1 for the infrequent eyelid droop.    Her neurologic exam is stable today.    She will continue with Mestinon 30 mg up to 3 times a day  Orders:    pyridostigmine (MESTINON) 60 mg tablet; Take 0.5 tablets (30 mg total) by mouth 3 (three) times a day 90 day supply

## 2025-07-24 NOTE — ASSESSMENT & PLAN NOTE
Migraines continue to be well-controlled.  She is taking Topamax 50 mg at bedtime for migraine prevention.  At onset of migraine she takes rizatriptan and metoclopramide as needed for associated nausea.  She feels current medication regimen is adequate.  Will continue to monitor.  Orders:    rizatriptan (Maxalt-MLT) 10 mg disintegrating tablet; Take 1 tablet (10 mg total) by mouth as needed for migraine Take at the onset of migraine; if symptoms continue or return, may take another dose at least 2 hours after first dose. Take no more than 2 doses in a day.    metoclopramide (Reglan) 10 mg tablet; Take 1 tablet (10 mg total) by mouth 2 (two) times a day as needed (migraine)

## 2025-07-24 NOTE — PROGRESS NOTES
Name: Sunita Carranza      : 2000      MRN: [id number]  Encounter Provider: Tanesha Muñoz PA-C  Encounter Date: 2025   [facility]  :  Assessment & Plan  Myasthenia gravis (HCC)  Patient has antibody positive myasthenia gravis and underwent thymectomy in  and is currently taking Mestinon 30 mg up to 3 times a day, which has controlled her symptoms.  She only occasionally notices eyelid drooping at times, no diplopia or other symptoms.  She has actually started running and has been doing great with this.  Today's MG ADL score was 1 for the infrequent eyelid droop.    Her neurologic exam is stable today.    She will continue with Mestinon 30 mg up to 3 times a day  Orders:    pyridostigmine (MESTINON) 60 mg tablet; Take 0.5 tablets (30 mg total) by mouth 3 (three) times a day 90 day supply    Migraine without aura and without status migrainosus, not intractable  Migraines continue to be well-controlled.  She is taking Topamax 50 mg at bedtime for migraine prevention.  At onset of migraine she takes rizatriptan and metoclopramide as needed for associated nausea.  She feels current medication regimen is adequate.  Will continue to monitor.  Orders:    rizatriptan (Maxalt-MLT) 10 mg disintegrating tablet; Take 1 tablet (10 mg total) by mouth as needed for migraine Take at the onset of migraine; if symptoms continue or return, may take another dose at least 2 hours after first dose. Take no more than 2 doses in a day.    metoclopramide (Reglan) 10 mg tablet; Take 1 tablet (10 mg total) by mouth 2 (two) times a day as needed (migraine)    Frequent headaches    Orders:    topiramate (TOPAMAX) 25 mg tablet; 2 tab po qhs    metoclopramide (Reglan) 10 mg tablet; Take 1 tablet (10 mg total) by mouth 2 (two) times a day as needed (migraine)    Follow-up in 1 year or sooner if needed.      History of Present Illness   HPI   Sunita Carranza is a 25 y.o. female who  presents today for neurologic follow up.     To review, patient was initially followed for migraine headaches, started on topiramate for prevention. As part of her workup, MRI brain was ordered and completed 6/2/2020. Brain normal, although there was borderline enlargement of the pituitary gland with a convex superior margin.  This may represent pituitary hyperplasia, particularly if the patient is on oral contraceptives (she was). MRI brain pituitary w and wo contrast completed 6/10/2020. This demonstrated mild diffuse enlargement of the pituitary gland with homogeneous enhancement. Differential considerations include pituitary hyperplasia versus macroadenoma. She was referred to endocrinology, no hormonal abnormalities. This was favored to be physiologic enlargement of the pituitary.      In May 2022 patient contacted the office with new concerns. In the message, she reported developing some concerning symptoms including GI distress (abd pain which was relieved with vomiting), headaches that were different than her migraines, shaking in the hands, back pain and neck pain, muscle weakness. 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 While waiting for an appt, Dr. Mosquera ordered some updated studies. MRA head completed 6/28/22 was unremarkable. MRI c-spine 6/28/22 with normal cord signal. Small central disc protrusion at level C5-C6 resulting in mild canal narrowing.  No foraminal stenosis.     Patient was seen in August 2022 along with her mother to discuss ongoing symptoms. Both Sunita and her mother were very concerned regarding the newer symptoms. Sunita reported symptoms started at the beginning of 2022. While attending school at [email or web address] at the beginning of the year, she started having difficulty ambulating, especially on the hilly campus. She reported having  numerous falls, occurring a few times a week. She had trouble going up her stairs and actually had to move out of her 3rd floor apartment because it was too difficult to climb the stairs. She reported feeling like her legs were getting weak and giving out. Right side seemed worse than left. She reporting taking the train into [employer] for work a few times a week and frequently falling while getting onto the train. She is able to get herself back up after a fall. She denied any dizziness or vertigo. She was also noticing intermittent double vision, usually with mid-distance such as watching tv. She feels like she has to look down out of the bottom of her vision in order for this to improve. She reported some shaking of her hands, mostly her right, when she is doing something like holding a glass, handing something to someone. Her mother had noticed this as well. Her hands would cramp when she was writing or typing for over 10 min at a time. She denied weakness in the hands or arms, dropping objects. When asked, she noted some urinary incontinence a few times. She denied any muscle twitching or fasciculations. She denied any significant SOB, dysphagia. She had never presented to the ED for any of these symptoms. Mom was questioning if this could be MS. There is no known family history of MS or other neuromuscular disease. She denied any specific inciting event prior to symptom onset. She did report she went through a stressful situation in April, but symptoms had already started prior to that.   Her exam at the visit was essentially unremarkable except for slight right hip flexion weakness.     Additional workup was ordered at time of her visit in August 2022:  --Labs 8/17/22: Acetylcholine receptor binding >80.00, acetylcholine receptor blocking 49%, MUSK ab negative.  B12 516, vit D 15.5, negative EDUARDO, RF, Sjogren's. Sed rate nl 13, copper slightly low 77, normal magnesium 2.2.  --EMG RUE/RLE 9/15/22 was normal  --CT  chest w contrast 9/13/22 residual thymic tissue noted to be somewhat full for patient's age and suggest thymic hyperplasia. No findings suspicious for thymoma. Incidental thyroid nodule, incidental 1.9cm cyst at the level of the suprasternal notch just left of midline.  This could represent an incidental dermoid cyst or thyroglossal duct cyst.  --MRI t-spine 9/14/22 normal cord signal. Incidentally seen 1.3cm right thyroid nodule. Redemonstrated left infra thyroidal soft tissue lesion at the level of the suprasternal notch may represent benign lymph node     Once patient's labs were resulted and acetylcholine receptor abs were positive, she was called and started on Mestinon 30mg TID. She noticed significant improvement in symptoms. She had minor GI upset in the first 2 days of taking the medication, but this improved.     She was seen by Dr. Madison on 9/23/22. Mestinon 30mg TID continued. She was referred to cardiothoracic surgery due to residual thymus tissue.     Patient underwent robotic assisted left thorascopic thymectomy on 12/16/2022. Surgery went well without complication. Neurology was consulted in the hospital to comment on her post-surgical MG meds, and it was advised she continue Mestinon 30mg TID.     Patient has done well over the last several years with Mestinon.      Today, patient reports she is doing well.  She remains on Mestinon 30 mg 3 times a day and feels that the dose is adequate.  MG score was 1, intermittent eyelid drooping noted, but not daily.  She denies any diplopia.  She denies any shortness of breath, talking or chewing difficulties, weakness/difficulty ambulating stairs.  In fact, she is taken off running and runs 2-3 miles at a time and is trying to increase this.  She is feeling great with running.  She is engaged and getting  in October 2026.  She is teaching high school English.  Migraines have overall been well-controlled with current medication regimen.    Review of  "Systems   Constitutional: Negative.  Negative for fatigue and fever.   HENT: Negative.  Negative for hearing loss, tinnitus and trouble swallowing.    Eyes:  Negative for photophobia, pain and visual disturbance.   Respiratory: Negative.  Negative for cough and shortness of breath.    Cardiovascular: Negative.  Negative for chest pain and palpitations.   Gastrointestinal:  Negative for constipation, diarrhea, nausea and vomiting.   Endocrine: Negative.    Genitourinary: Negative.  Negative for difficulty urinating and urgency.   Musculoskeletal: Negative.  Negative for back pain, gait problem and neck pain.   Skin: Negative.  Negative for rash.   Neurological: Negative.  Negative for dizziness, tremors, seizures, syncope, speech difficulty, weakness, numbness and headaches.   Hematological: Negative.    Psychiatric/Behavioral:  Negative for decreased concentration and sleep disturbance. The patient is not nervous/anxious.     I have personally reviewed the MA's review of systems and made changes as necessary.    Medications Ordered Prior to Encounter[1]      Objective   /76 (BP Location: Right arm, Patient Position: Sitting, Cuff Size: Standard)   Pulse 72   Temp 98 °F (36.7 °C) (Temporal)   Resp 14   Ht 5' 7\" (1.702 m)   Wt 84.6 kg (186 lb 6.4 oz)   SpO2 97%   BMI 29.19 kg/m²     Physical Exam  Constitutional:       Appearance: Normal appearance.     Eyes:      Extraocular Movements: EOM normal.      Pupils: Pupils are equal, round, and reactive to light.       Neurological:      Mental Status: She is alert.      Motor: Motor strength is normal.     Deep Tendon Reflexes: Reflexes are normal and symmetric.     Psychiatric:         Mood and Affect: Mood normal.         Speech: Speech normal.         Behavior: Behavior normal.       Neurological Exam  Mental Status  Alert. Oriented to person, place, time and situation. Speech is normal. Language is fluent with no aphasia. Attention and concentration are " normal.    Cranial Nerves  CN II: Visual fields full to confrontation.  CN III, IV, VI: Extraocular movements intact bilaterally. Pupils equal round and reactive to light bilaterally.  CN V: Facial sensation is normal.  CN VII: Full and symmetric facial movement.  CN VIII: Hearing is normal.  CN IX, X: Palate elevates symmetrically  CN XI: Shoulder shrug strength is normal.  CN XII: Tongue midline without atrophy or fasciculations.    Motor   Normal muscle tone. No abnormal involuntary movements. Strength is 5/5 throughout all four extremities.    Sensory  Light touch is normal in upper and lower extremities.     Reflexes  Deep tendon reflexes are 2+ and symmetric in all four extremities.    Coordination  Right: Finger-to-nose normal.Left: Finger-to-nose normal.    Gait  Casual gait is normal including stance, stride, and arm swing. Able to rise from chair without using arms.         [1]   Current Outpatient Medications on File Prior to Visit   Medication Sig Dispense Refill    cholecalciferol (VITAMIN D3) 1,000 units tablet Take 1,000 Units by mouth in the morning.      ferrous sulfate 325 (65 Fe) mg tablet Take 325 mg by mouth daily at bedtime      Fiber Complete TABS Take 2 tablets by mouth in the morning.      methocarbamol (ROBAXIN) 500 mg tablet Take 1 tablet (500 mg total) by mouth 2 (two) times a day as needed for muscle spasms 30 tablet 1    [DISCONTINUED] metoclopramide (Reglan) 10 mg tablet Take 1 tablet (10 mg total) by mouth 2 (two) times a day as needed (migraine) 25 tablet 1    [DISCONTINUED] ondansetron (Zofran ODT) 4 mg disintegrating tablet Take 1 tablet (4 mg total) by mouth every 6 (six) hours as needed for nausea or vomiting 20 tablet 0    [DISCONTINUED] pyridostigmine (MESTINON) 60 mg tablet Take 0.5 tablets (30 mg total) by mouth 3 (three) times a day 90 day supply 135 tablet 3    [DISCONTINUED] rizatriptan (Maxalt-MLT) 10 mg disintegrating tablet Take 1 tablet (10 mg total) by mouth as needed  for migraine Take at the onset of migraine; if symptoms continue or return, may take another dose at least 2 hours after first dose. Take no more than 2 doses in a day. 18 tablet 1    [DISCONTINUED] topiramate (TOPAMAX) 25 mg tablet 2 tab po qhs 180 tablet 3     No current facility-administered medications on file prior to visit.

## (undated) DEVICE — SUT VICRYL 3-0 SH 27 IN J416H

## (undated) DEVICE — LUBRICANT INST ELECTROLUBE ANTISTK WO PAD

## (undated) DEVICE — SUT PROLENE 0 CT-1 30 IN 8424H

## (undated) DEVICE — JP CHANNEL DRAIN, 24FR HUBLESS: Brand: CARDINAL HEALTH

## (undated) DEVICE — GAUZE ROLL KITTNER

## (undated) DEVICE — CLAMP TOWEL TUBING DISPOSABLE

## (undated) DEVICE — INTENDED FOR TISSUE SEPARATION, AND OTHER PROCEDURES THAT REQUIRE A SHARP SURGICAL BLADE TO PUNCTURE OR CUT.: Brand: BARD-PARKER SAFETY BLADES SIZE 15, STERILE

## (undated) DEVICE — ADHESIVE SKIN HIGH VISCOSITY EXOFIN 1ML

## (undated) DEVICE — HEAVY DUTY TABLE COVER: Brand: CONVERTORS

## (undated) DEVICE — UTILITY MARKER,BLACK WITH LABELS: Brand: DEVON

## (undated) DEVICE — STERILE EMESIS BASIN                 070: Brand: CARDINAL HEALTH

## (undated) DEVICE — ADAPTOR TRACH SWIVEL

## (undated) DEVICE — SYRINGE 20ML LL

## (undated) DEVICE — ARM DRAPE

## (undated) DEVICE — GLOVE INDICATOR PI UNDERGLOVE SZ 8 BLUE

## (undated) DEVICE — VISUALIZATION SYSTEM: Brand: CLEARIFY

## (undated) DEVICE — COLUMN DRAPE

## (undated) DEVICE — CANNULA SEAL

## (undated) DEVICE — CADIERE FORCEPS: Brand: ENDOWRIST

## (undated) DEVICE — SURGICEL 4 X 8

## (undated) DEVICE — TUBING SUCTION 5MM X 12 FT

## (undated) DEVICE — SUT VICRYL 0 CT-1 18 IN J740D

## (undated) DEVICE — MARYLAND BIPOLAR FORCEPS: Brand: ENDOWRIST

## (undated) DEVICE — GAUZE SPONGES,16 PLY: Brand: CURITY

## (undated) DEVICE — SINGLE USE SUCTION VALVE MAJ-209: Brand: SINGLE USE SUCTION VALVE (STERILE)

## (undated) DEVICE — SINGLE USE BIOPSY VALVE MAJ-210: Brand: SINGLE USE BIOPSY VALVE (STERILE)

## (undated) DEVICE — SINGLE TUBING WITH LARGE CONNECTOR FOR THORACIC SUCTION SYSTEM PUMP: Brand: THOPAZ TUBING SINGLE

## (undated) DEVICE — NEEDLE HYPO 22G X 1-1/2 IN

## (undated) DEVICE — CANISTER FOR THORACIC SUCTION SYSTEM: Brand: THOPAZ DISPOSABLE CANISTER 0.8L

## (undated) DEVICE — SUT MONOCRYL 4-0 PS-2 27 IN Y426H

## (undated) DEVICE — Device: Brand: TISSUE RETRIEVAL SYSTEM

## (undated) DEVICE — NEEDLE SPINAL 20G X 3.5 LF

## (undated) DEVICE — SUT VICRYL 2-0 SH 27 IN UNDYED J417H

## (undated) DEVICE — GLOVE SRG BIOGEL ECLIPSE 7.5

## (undated) DEVICE — KIT, BETHLEHEM THORACIC ROBOT: Brand: CARDINAL HEALTH

## (undated) DEVICE — CHLORAPREP HI-LITE 26ML ORANGE

## (undated) DEVICE — SYRINGE 10ML SLIP TIP LF

## (undated) DEVICE — FIRST STEP BEDSIDE KIT - STAND-UP POUCH, ENDOSCOPIC CLEANING PAD - 1 POUCH: Brand: FIRST STEP BEDSIDE KIT - STAND-UP POUCH, ENDOSCOPIC CLEANING PAD